# Patient Record
Sex: FEMALE | Race: WHITE | Employment: FULL TIME
[De-identification: names, ages, dates, MRNs, and addresses within clinical notes are randomized per-mention and may not be internally consistent; named-entity substitution may affect disease eponyms.]

---

## 2017-07-17 ENCOUNTER — CLINICAL DOCUMENTATION (OUTPATIENT)
Dept: OTHER | Facility: CLINIC | Age: 52
End: 2017-07-17

## 2018-03-28 DIAGNOSIS — J45.31 MILD PERSISTENT ASTHMATIC BRONCHITIS WITH ACUTE EXACERBATION: ICD-10-CM

## 2018-03-28 RX ORDER — FLUTICASONE FUROATE AND VILANTEROL 200; 25 UG/1; UG/1
1 POWDER RESPIRATORY (INHALATION) DAILY
Qty: 2 EACH | Refills: 5 | Status: SHIPPED | OUTPATIENT
Start: 2018-03-28 | End: 2019-05-02 | Stop reason: SDUPTHER

## 2018-03-28 RX ORDER — MONTELUKAST SODIUM 10 MG/1
10 TABLET ORAL NIGHTLY
Qty: 90 TABLET | Refills: 3 | Status: SHIPPED | OUTPATIENT
Start: 2018-03-28 | End: 2019-04-15 | Stop reason: SDUPTHER

## 2018-07-20 RX ORDER — PANTOPRAZOLE SODIUM 40 MG/1
40 TABLET, DELAYED RELEASE ORAL DAILY
Qty: 30 TABLET | Refills: 5 | Status: SHIPPED | OUTPATIENT
Start: 2018-07-20 | End: 2019-02-04 | Stop reason: SDUPTHER

## 2018-11-07 ENCOUNTER — TELEPHONE (OUTPATIENT)
Dept: FAMILY MEDICINE CLINIC | Age: 53
End: 2018-11-07

## 2018-11-12 ENCOUNTER — HOSPITAL ENCOUNTER (EMERGENCY)
Age: 53
Discharge: HOME | End: 2018-11-12
Attending: EMERGENCY MEDICINE
Payer: COMMERCIAL

## 2018-11-12 ENCOUNTER — APPOINTMENT (OUTPATIENT)
Dept: CT IMAGING | Age: 53
End: 2018-11-12
Payer: COMMERCIAL

## 2018-11-12 ENCOUNTER — OFFICE VISIT (OUTPATIENT)
Dept: FAMILY MEDICINE CLINIC | Age: 53
End: 2018-11-12
Payer: COMMERCIAL

## 2018-11-12 VITALS
BODY MASS INDEX: 38.65 KG/M2 | RESPIRATION RATE: 16 BRPM | HEART RATE: 90 BPM | WEIGHT: 270 LBS | DIASTOLIC BLOOD PRESSURE: 80 MMHG | OXYGEN SATURATION: 97 % | HEIGHT: 70 IN | TEMPERATURE: 98.1 F | SYSTOLIC BLOOD PRESSURE: 140 MMHG

## 2018-11-12 VITALS
HEART RATE: 106 BPM | RESPIRATION RATE: 18 BRPM | OXYGEN SATURATION: 95 % | HEIGHT: 70 IN | WEIGHT: 275 LBS | BODY MASS INDEX: 39.37 KG/M2 | DIASTOLIC BLOOD PRESSURE: 80 MMHG | SYSTOLIC BLOOD PRESSURE: 138 MMHG | TEMPERATURE: 98.2 F

## 2018-11-12 DIAGNOSIS — R10.9 FLANK PAIN: Primary | ICD-10-CM

## 2018-11-12 DIAGNOSIS — N20.0 KIDNEY STONE: ICD-10-CM

## 2018-11-12 DIAGNOSIS — R10.9 RIGHT FLANK PAIN: Primary | ICD-10-CM

## 2018-11-12 LAB
ALBUMIN SERPL-MCNC: 4.5 G/DL (ref 3.5–5.2)
ALP BLD-CCNC: 79 U/L (ref 35–104)
ALT SERPL-CCNC: 52 U/L (ref 0–32)
ANION GAP SERPL CALCULATED.3IONS-SCNC: 10 MMOL/L (ref 7–16)
AST SERPL-CCNC: 27 U/L (ref 0–31)
BACTERIA: NORMAL /HPF
BASOPHILS ABSOLUTE: 0.04 E9/L (ref 0–0.2)
BASOPHILS RELATIVE PERCENT: 0.7 % (ref 0–2)
BILIRUB SERPL-MCNC: 0.4 MG/DL (ref 0–1.2)
BILIRUBIN URINE: NEGATIVE
BILIRUBIN, POC: NORMAL
BLOOD URINE, POC: NORMAL
BLOOD, URINE: NEGATIVE
BUN BLDV-MCNC: 13 MG/DL (ref 6–20)
CALCIUM SERPL-MCNC: 9.4 MG/DL (ref 8.6–10.2)
CHLORIDE BLD-SCNC: 103 MMOL/L (ref 98–107)
CLARITY, POC: CLEAR
CLARITY: CLEAR
CO2: 28 MMOL/L (ref 22–29)
COLOR, POC: YELLOW
COLOR: ABNORMAL
CREAT SERPL-MCNC: 0.6 MG/DL (ref 0.5–1)
D DIMER: <200 NG/ML DDU
EOSINOPHILS ABSOLUTE: 0.18 E9/L (ref 0.05–0.5)
EOSINOPHILS RELATIVE PERCENT: 3.2 % (ref 0–6)
GFR AFRICAN AMERICAN: >60
GFR NON-AFRICAN AMERICAN: >60 ML/MIN/1.73
GLUCOSE BLD-MCNC: 97 MG/DL (ref 74–99)
GLUCOSE URINE, POC: NORMAL
GLUCOSE URINE: NEGATIVE MG/DL
HCT VFR BLD CALC: 45.3 % (ref 34–48)
HEMOGLOBIN: 14 G/DL (ref 11.5–15.5)
IMMATURE GRANULOCYTES #: 0.01 E9/L
IMMATURE GRANULOCYTES %: 0.2 % (ref 0–5)
KETONES, POC: NORMAL
KETONES, URINE: NEGATIVE MG/DL
LACTIC ACID: 0.9 MMOL/L (ref 0.5–2.2)
LEUKOCYTE EST, POC: NORMAL
LEUKOCYTE ESTERASE, URINE: ABNORMAL
LYMPHOCYTES ABSOLUTE: 1.5 E9/L (ref 1.5–4)
LYMPHOCYTES RELATIVE PERCENT: 26.5 % (ref 20–42)
MCH RBC QN AUTO: 28.7 PG (ref 26–35)
MCHC RBC AUTO-ENTMCNC: 30.9 % (ref 32–34.5)
MCV RBC AUTO: 92.8 FL (ref 80–99.9)
MONOCYTES ABSOLUTE: 0.56 E9/L (ref 0.1–0.95)
MONOCYTES RELATIVE PERCENT: 9.9 % (ref 2–12)
NEUTROPHILS ABSOLUTE: 3.37 E9/L (ref 1.8–7.3)
NEUTROPHILS RELATIVE PERCENT: 59.5 % (ref 43–80)
NITRITE, POC: NORMAL
NITRITE, URINE: NEGATIVE
PDW BLD-RTO: 14 FL (ref 11.5–15)
PH UA: 7.5 (ref 5–9)
PH, POC: 7
PLATELET # BLD: 264 E9/L (ref 130–450)
PMV BLD AUTO: 10.7 FL (ref 7–12)
POTASSIUM SERPL-SCNC: 4.1 MMOL/L (ref 3.5–5)
PROTEIN UA: NEGATIVE MG/DL
PROTEIN, POC: NORMAL
RBC # BLD: 4.88 E12/L (ref 3.5–5.5)
RBC UA: NORMAL /HPF (ref 0–2)
SODIUM BLD-SCNC: 141 MMOL/L (ref 132–146)
SPECIFIC GRAVITY UA: 1.01 (ref 1–1.03)
SPECIFIC GRAVITY, POC: 1.01
TOTAL PROTEIN: 7.3 G/DL (ref 6.4–8.3)
UROBILINOGEN, POC: NORMAL
UROBILINOGEN, URINE: 0.2 E.U./DL
WBC # BLD: 5.7 E9/L (ref 4.5–11.5)
WBC UA: NORMAL /HPF (ref 0–5)

## 2018-11-12 PROCEDURE — 96374 THER/PROPH/DIAG INJ IV PUSH: CPT

## 2018-11-12 PROCEDURE — 83605 ASSAY OF LACTIC ACID: CPT

## 2018-11-12 PROCEDURE — 85378 FIBRIN DEGRADE SEMIQUANT: CPT

## 2018-11-12 PROCEDURE — 87088 URINE BACTERIA CULTURE: CPT

## 2018-11-12 PROCEDURE — 74176 CT ABD & PELVIS W/O CONTRAST: CPT

## 2018-11-12 PROCEDURE — 85025 COMPLETE CBC W/AUTO DIFF WBC: CPT

## 2018-11-12 PROCEDURE — 6360000002 HC RX W HCPCS: Performed by: NURSE PRACTITIONER

## 2018-11-12 PROCEDURE — 99284 EMERGENCY DEPT VISIT MOD MDM: CPT

## 2018-11-12 PROCEDURE — 2580000003 HC RX 258: Performed by: NURSE PRACTITIONER

## 2018-11-12 PROCEDURE — 99214 OFFICE O/P EST MOD 30 MIN: CPT | Performed by: FAMILY MEDICINE

## 2018-11-12 PROCEDURE — 81002 URINALYSIS NONAUTO W/O SCOPE: CPT | Performed by: FAMILY MEDICINE

## 2018-11-12 PROCEDURE — 81001 URINALYSIS AUTO W/SCOPE: CPT

## 2018-11-12 PROCEDURE — 80053 COMPREHEN METABOLIC PANEL: CPT

## 2018-11-12 RX ORDER — TIZANIDINE HYDROCHLORIDE 4 MG/1
4 CAPSULE, GELATIN COATED ORAL 3 TIMES DAILY PRN
Qty: 12 CAPSULE | Refills: 0 | Status: SHIPPED | OUTPATIENT
Start: 2018-11-12 | End: 2018-11-16

## 2018-11-12 RX ORDER — 0.9 % SODIUM CHLORIDE 0.9 %
1000 INTRAVENOUS SOLUTION INTRAVENOUS ONCE
Status: COMPLETED | OUTPATIENT
Start: 2018-11-12 | End: 2018-11-12

## 2018-11-12 RX ORDER — KETOROLAC TROMETHAMINE 30 MG/ML
30 INJECTION, SOLUTION INTRAMUSCULAR; INTRAVENOUS ONCE
Status: COMPLETED | OUTPATIENT
Start: 2018-11-12 | End: 2018-11-12

## 2018-11-12 RX ORDER — NAPROXEN 500 MG/1
500 TABLET ORAL 2 TIMES DAILY
Qty: 14 TABLET | Refills: 0 | Status: SHIPPED | OUTPATIENT
Start: 2018-11-12 | End: 2019-02-15 | Stop reason: ALTCHOICE

## 2018-11-12 RX ORDER — SULFASALAZINE 500 MG/1
TABLET ORAL
COMMUNITY
Start: 2018-10-28 | End: 2019-10-21 | Stop reason: SDUPTHER

## 2018-11-12 RX ADMIN — KETOROLAC TROMETHAMINE 30 MG: 30 INJECTION, SOLUTION INTRAMUSCULAR at 19:29

## 2018-11-12 RX ADMIN — SODIUM CHLORIDE 1000 ML: 9 INJECTION, SOLUTION INTRAVENOUS at 19:29

## 2018-11-12 ASSESSMENT — ENCOUNTER SYMPTOMS
NAUSEA: 0
DIARRHEA: 0
ALLERGIC/IMMUNOLOGIC NEGATIVE: 1
VOMITING: 0
COLOR CHANGE: 0
PHOTOPHOBIA: 0
CHEST TIGHTNESS: 0
SINUS PRESSURE: 0
WHEEZING: 0
SHORTNESS OF BREATH: 0
ABDOMINAL PAIN: 0
BACK PAIN: 0
BLOOD IN STOOL: 0
COUGH: 0
APNEA: 0
SORE THROAT: 0
FACIAL SWELLING: 0

## 2018-11-12 ASSESSMENT — PATIENT HEALTH QUESTIONNAIRE - PHQ9
SUM OF ALL RESPONSES TO PHQ QUESTIONS 1-9: 0
SUM OF ALL RESPONSES TO PHQ9 QUESTIONS 1 & 2: 0
SUM OF ALL RESPONSES TO PHQ QUESTIONS 1-9: 0
1. LITTLE INTEREST OR PLEASURE IN DOING THINGS: 0
2. FEELING DOWN, DEPRESSED OR HOPELESS: 0

## 2018-11-12 ASSESSMENT — PAIN DESCRIPTION - PROGRESSION: CLINICAL_PROGRESSION: GRADUALLY IMPROVING

## 2018-11-12 ASSESSMENT — PAIN SCALES - GENERAL
PAINLEVEL_OUTOF10: 9
PAINLEVEL_OUTOF10: 9
PAINLEVEL_OUTOF10: 4

## 2018-11-12 ASSESSMENT — PAIN DESCRIPTION - LOCATION: LOCATION: FLANK

## 2018-11-12 ASSESSMENT — PAIN DESCRIPTION - DESCRIPTORS: DESCRIPTORS: ACHING;DULL

## 2018-11-12 ASSESSMENT — PAIN DESCRIPTION - ORIENTATION: ORIENTATION: RIGHT

## 2018-11-12 NOTE — ED NOTES
FIRST PROVIDER CONTACT ASSESSMENT NOTE      Department of Emergency Medicine   11/12/18  5:19 PM    Chief Complaint: Flank Pain (right sided- started around 8 am )    History of Present Illness:   Armand Coleman is a 46 y.o. female who presents to the ED for right sided flank pain around 8am this morning. She described the pain as stabbing. She also reports burning with urination and small amounts of blood in the urine. She denies nausea, vomiting, diarrhea, or fever. Focused Physical Exam:  VS:  BP (!) 137/92   Pulse 126   Temp 98.1 °F (36.7 °C) (Oral)   Resp 16   Ht 5' 10\" (1.778 m)   Wt 270 lb (122.5 kg)   SpO2 97%   BMI 38.74 kg/m²      General: Alert and in no apparent distress   Abd: Non-tender, non-distended  Back: Right CVA tenderness     Medical History:  has a past medical history of Arthritis and Asthma. Surgical History:  has a past surgical history that includes Cystoscopy (01/10/2015). Social History:  reports that she has never smoked. She has never used smokeless tobacco. She reports that she drinks alcohol. She reports that she does not use drugs. Family History: family history is not on file.     *ALLERGIES*     Penicillins     Initial Plan of Care:  Initiate Treatment-Testing, Proceed toTreatment Area When Bed Available for ED Attending/MLP to Continue Care    -----------------END OF FIRST PROVIDER CONTACT ASSESSMENT NOTE--------------  Electronically signed by Robert Brown PA-C   DD: 11/12/18         Robert Brown PA-C  11/12/18 5801

## 2018-11-12 NOTE — ED PROVIDER NOTES
History:  reports that she has never smoked. She has never used smokeless tobacco. She reports that she drinks alcohol. She reports that she does not use drugs. Family History: family history is not on file. Allergies: Penicillins    Physical Exam            ED Triage Vitals [11/12/18 1720]   BP Temp Temp Source Pulse Resp SpO2 Height Weight   (!) 137/92 98.1 °F (36.7 °C) Oral 126 16 97 % 5' 10\" (1.778 m) 270 lb (122.5 kg)      Oxygen Saturation Interpretation: Normal.    · General Appearance/Constitutional:  Alert, development consistent with age, NAD  · HEENT:  NC/NT. PERRLA. Airway patent. · Neck:  Supple. No lymphadenopathy. No meningeal signs. · Respiratory:  No retractions. Lungs Clear to auscultation and breath sounds equal.  · CV:  Regular rate and rhythm. · GI:  General Appearance: normal.         Bowel sounds: normal bowel sounds. Distension:  None. Tenderness: moderate tenderness is present in the right flank, no rebound tenderness, no guarding. Patient's pain is reproducible with light palpation noted to the right flank. There are no wounds, lacerations or abrasions or ecchymosis noted here. There is no depressions or deformities noted in the ribs. Liver: non-tender. Spleen:  non-tender. Pulsatile Mass: absent. · Back: CVA Tenderness: Yes, Right. · Integument:  Normal turgor. Warm, dry, without visible rash, unless noted elsewhere. · Lymphatics: No edema, cap.refill <3sec. · Neurological:  Orientation age-appropriate. Motor functions intact.     Lab / Imaging Results   (All laboratory and radiology results have been personally reviewed by myself)  Labs:  Results for orders placed or performed during the hospital encounter of 11/12/18   CBC Auto Differential   Result Value Ref Range    WBC 5.7 4.5 - 11.5 E9/L    RBC 4.88 3.50 - 5.50 E12/L    Hemoglobin 14.0 11.5 - 15.5 g/dL    Hematocrit 45.3 34.0 - 48.0 %    MCV 92.8 80.0 - 99.9 fL MCH 28.7 26.0 - 35.0 pg    MCHC 30.9 (L) 32.0 - 34.5 %    RDW 14.0 11.5 - 15.0 fL    Platelets 397 527 - 219 E9/L    MPV 10.7 7.0 - 12.0 fL    Neutrophils % 59.5 43.0 - 80.0 %    Immature Granulocytes % 0.2 0.0 - 5.0 %    Lymphocytes % 26.5 20.0 - 42.0 %    Monocytes % 9.9 2.0 - 12.0 %    Eosinophils % 3.2 0.0 - 6.0 %    Basophils % 0.7 0.0 - 2.0 %    Neutrophils # 3.37 1.80 - 7.30 E9/L    Immature Granulocytes # 0.01 E9/L    Lymphocytes # 1.50 1.50 - 4.00 E9/L    Monocytes # 0.56 0.10 - 0.95 E9/L    Eosinophils # 0.18 0.05 - 0.50 E9/L    Basophils # 0.04 0.00 - 0.20 E9/L   Comprehensive Metabolic Panel   Result Value Ref Range    Sodium 141 132 - 146 mmol/L    Potassium 4.1 3.5 - 5.0 mmol/L    Chloride 103 98 - 107 mmol/L    CO2 28 22 - 29 mmol/L    Anion Gap 10 7 - 16 mmol/L    Glucose 97 74 - 99 mg/dL    BUN 13 6 - 20 mg/dL    CREATININE 0.6 0.5 - 1.0 mg/dL    GFR Non-African American >60 >=60 mL/min/1.73    GFR African American >60     Calcium 9.4 8.6 - 10.2 mg/dL    Total Protein 7.3 6.4 - 8.3 g/dL    Alb 4.5 3.5 - 5.2 g/dL    Total Bilirubin 0.4 0.0 - 1.2 mg/dL    Alkaline Phosphatase 79 35 - 104 U/L    ALT 52 (H) 0 - 32 U/L    AST 27 0 - 31 U/L   Urinalysis   Result Value Ref Range    Color, UA Straw Straw/Yellow    Clarity, UA Clear Clear    Glucose, Ur Negative Negative mg/dL    Bilirubin Urine Negative Negative    Ketones, Urine Negative Negative mg/dL    Specific Gravity, UA 1.010 1.005 - 1.030    Blood, Urine Negative Negative    pH, UA 7.5 5.0 - 9.0    Protein, UA Negative Negative mg/dL    Urobilinogen, Urine 0.2 <2.0 E.U./dL    Nitrite, Urine Negative Negative    Leukocyte Esterase, Urine SMALL (A) Negative   Lactic Acid, Plasma   Result Value Ref Range    Lactic Acid 0.9 0.5 - 2.2 mmol/L   D-Dimer, Quantitative   Result Value Ref Range    D-Dimer, Quant <200 ng/mL DDU   Microscopic Urinalysis   Result Value Ref Range    WBC, UA 1-3 0 - 5 /HPF    RBC, UA NONE 0 - 2 /HPF    Bacteria, UA NONE /HPF OF PROVIDER NOTE         Kris Rice, AMIRAH - CNP  11/12/18 1042

## 2018-11-12 NOTE — PROGRESS NOTES
TABLET BY MOUTH EVERY DAY with evening meal  5    Cholecalciferol (VITAMIN D) 2000 units CAPS capsule Take by mouth      cetirizine (ZYRTEC) 10 MG tablet Take 10 mg by mouth daily      hydroxychloroquine (PLAQUENIL) 200 MG tablet Take 2 tablets by mouth daily 60 tablet 0    predniSONE (DELTASONE) 5 MG tablet Take 1 tablet by mouth daily 30 tablet 0     No current facility-administered medications for this visit. Allergies   Allergen Reactions    Penicillins        Social History     Social History    Marital status:      Spouse name: N/A    Number of children: N/A    Years of education: N/A     Social History Main Topics    Smoking status: Never Smoker    Smokeless tobacco: Never Used    Alcohol use Yes      Comment: social    Drug use: No    Sexual activity: Not Asked     Other Topics Concern    None     Social History Narrative    None       No family history on file. Review of Systems   Constitutional: Negative. HENT: Negative for congestion, facial swelling, hearing loss, nosebleeds, sinus pressure and sore throat. Eyes: Negative for photophobia and visual disturbance. Respiratory: Negative for apnea, cough, chest tightness, shortness of breath and wheezing. Cardiovascular: Negative for chest pain, palpitations and leg swelling. Gastrointestinal: Negative for abdominal pain, blood in stool, diarrhea, nausea and vomiting. Genitourinary: Positive for flank pain and hematuria. Negative for difficulty urinating, frequency and urgency. Musculoskeletal: Negative for arthralgias, back pain, joint swelling and myalgias. Skin: Negative for color change and rash. Allergic/Immunologic: Negative. Neurological: Negative for syncope, weakness, light-headedness and headaches. Hematological: Negative. Psychiatric/Behavioral: Negative for agitation, behavioral problems, confusion and self-injury. The patient is not nervous/anxious.     All other systems reviewed and

## 2018-11-15 LAB — URINE CULTURE, ROUTINE: NORMAL

## 2019-02-04 RX ORDER — PANTOPRAZOLE SODIUM 40 MG/1
TABLET, DELAYED RELEASE ORAL
Qty: 30 TABLET | Refills: 4 | Status: SHIPPED | OUTPATIENT
Start: 2019-02-04 | End: 2019-07-25 | Stop reason: SDUPTHER

## 2019-02-15 ENCOUNTER — HOSPITAL ENCOUNTER (EMERGENCY)
Age: 54
Discharge: HOME | End: 2019-02-15
Attending: EMERGENCY MEDICINE
Payer: COMMERCIAL

## 2019-02-15 VITALS
SYSTOLIC BLOOD PRESSURE: 134 MMHG | HEIGHT: 70 IN | OXYGEN SATURATION: 94 % | TEMPERATURE: 98.1 F | HEART RATE: 80 BPM | RESPIRATION RATE: 16 BRPM | BODY MASS INDEX: 38.65 KG/M2 | DIASTOLIC BLOOD PRESSURE: 68 MMHG | WEIGHT: 270 LBS

## 2019-02-15 DIAGNOSIS — R11.2 NON-INTRACTABLE VOMITING WITH NAUSEA, UNSPECIFIED VOMITING TYPE: Primary | ICD-10-CM

## 2019-02-15 DIAGNOSIS — R19.7 DIARRHEA OF PRESUMED INFECTIOUS ORIGIN: ICD-10-CM

## 2019-02-15 LAB
ANION GAP SERPL CALCULATED.3IONS-SCNC: 9 MMOL/L (ref 7–16)
BACTERIA: ABNORMAL /HPF
BASOPHILS ABSOLUTE: 0.03 E9/L (ref 0–0.2)
BASOPHILS RELATIVE PERCENT: 0.7 % (ref 0–2)
BILIRUBIN URINE: ABNORMAL
BLOOD, URINE: NEGATIVE
BUN BLDV-MCNC: 15 MG/DL (ref 6–20)
CALCIUM SERPL-MCNC: 8.4 MG/DL (ref 8.6–10.2)
CHLORIDE BLD-SCNC: 105 MMOL/L (ref 98–107)
CLARITY: CLEAR
CO2: 29 MMOL/L (ref 22–29)
COLOR: YELLOW
CREAT SERPL-MCNC: 0.6 MG/DL (ref 0.5–1)
EOSINOPHILS ABSOLUTE: 0.13 E9/L (ref 0.05–0.5)
EOSINOPHILS RELATIVE PERCENT: 3 % (ref 0–6)
EPITHELIAL CELLS, UA: ABNORMAL /HPF
GFR AFRICAN AMERICAN: >60
GFR NON-AFRICAN AMERICAN: >60 ML/MIN/1.73
GLUCOSE BLD-MCNC: 92 MG/DL (ref 74–99)
GLUCOSE URINE: NEGATIVE MG/DL
HCT VFR BLD CALC: 43.1 % (ref 34–48)
HEMOGLOBIN: 13.7 G/DL (ref 11.5–15.5)
IMMATURE GRANULOCYTES #: 0.01 E9/L
IMMATURE GRANULOCYTES %: 0.2 % (ref 0–5)
KETONES, URINE: ABNORMAL MG/DL
LACTIC ACID: 1.1 MMOL/L (ref 0.5–2.2)
LEUKOCYTE ESTERASE, URINE: ABNORMAL
LYMPHOCYTES ABSOLUTE: 0.93 E9/L (ref 1.5–4)
LYMPHOCYTES RELATIVE PERCENT: 21.4 % (ref 20–42)
MAGNESIUM: 1.7 MG/DL (ref 1.6–2.6)
MCH RBC QN AUTO: 29.5 PG (ref 26–35)
MCHC RBC AUTO-ENTMCNC: 31.8 % (ref 32–34.5)
MCV RBC AUTO: 92.9 FL (ref 80–99.9)
MONOCYTES ABSOLUTE: 0.45 E9/L (ref 0.1–0.95)
MONOCYTES RELATIVE PERCENT: 10.4 % (ref 2–12)
NEUTROPHILS ABSOLUTE: 2.79 E9/L (ref 1.8–7.3)
NEUTROPHILS RELATIVE PERCENT: 64.3 % (ref 43–80)
NITRITE, URINE: NEGATIVE
PDW BLD-RTO: 13.2 FL (ref 11.5–15)
PH UA: 6 (ref 5–9)
PLATELET # BLD: 248 E9/L (ref 130–450)
PMV BLD AUTO: 11 FL (ref 7–12)
POTASSIUM REFLEX MAGNESIUM: 3.3 MMOL/L (ref 3.5–5)
PROTEIN UA: NEGATIVE MG/DL
RBC # BLD: 4.64 E12/L (ref 3.5–5.5)
RBC UA: ABNORMAL /HPF (ref 0–2)
SODIUM BLD-SCNC: 143 MMOL/L (ref 132–146)
SPECIFIC GRAVITY UA: 1.02 (ref 1–1.03)
UROBILINOGEN, URINE: 0.2 E.U./DL
WBC # BLD: 4.3 E9/L (ref 4.5–11.5)
WBC UA: ABNORMAL /HPF (ref 0–5)

## 2019-02-15 PROCEDURE — 2580000003 HC RX 258: Performed by: EMERGENCY MEDICINE

## 2019-02-15 PROCEDURE — 99283 EMERGENCY DEPT VISIT LOW MDM: CPT

## 2019-02-15 PROCEDURE — 96375 TX/PRO/DX INJ NEW DRUG ADDON: CPT

## 2019-02-15 PROCEDURE — 87088 URINE BACTERIA CULTURE: CPT

## 2019-02-15 PROCEDURE — 83605 ASSAY OF LACTIC ACID: CPT

## 2019-02-15 PROCEDURE — 81001 URINALYSIS AUTO W/SCOPE: CPT

## 2019-02-15 PROCEDURE — 36415 COLL VENOUS BLD VENIPUNCTURE: CPT

## 2019-02-15 PROCEDURE — 80048 BASIC METABOLIC PNL TOTAL CA: CPT

## 2019-02-15 PROCEDURE — 6360000002 HC RX W HCPCS: Performed by: EMERGENCY MEDICINE

## 2019-02-15 PROCEDURE — 96374 THER/PROPH/DIAG INJ IV PUSH: CPT

## 2019-02-15 PROCEDURE — 83735 ASSAY OF MAGNESIUM: CPT

## 2019-02-15 PROCEDURE — 85025 COMPLETE CBC W/AUTO DIFF WBC: CPT

## 2019-02-15 RX ORDER — ONDANSETRON 2 MG/ML
4 INJECTION INTRAMUSCULAR; INTRAVENOUS ONCE
Status: COMPLETED | OUTPATIENT
Start: 2019-02-15 | End: 2019-02-15

## 2019-02-15 RX ORDER — MORPHINE SULFATE 2 MG/ML
2 INJECTION, SOLUTION INTRAMUSCULAR; INTRAVENOUS ONCE
Status: DISCONTINUED | OUTPATIENT
Start: 2019-02-15 | End: 2019-02-15 | Stop reason: HOSPADM

## 2019-02-15 RX ORDER — SODIUM CHLORIDE 9 MG/ML
INJECTION, SOLUTION INTRAVENOUS ONCE
Status: COMPLETED | OUTPATIENT
Start: 2019-02-15 | End: 2019-02-15

## 2019-02-15 RX ORDER — SODIUM CHLORIDE 0.9 % (FLUSH) 0.9 %
10 SYRINGE (ML) INJECTION PRN
Status: DISCONTINUED | OUTPATIENT
Start: 2019-02-15 | End: 2019-02-15 | Stop reason: HOSPADM

## 2019-02-15 RX ORDER — PROMETHAZINE HYDROCHLORIDE 25 MG/1
25 TABLET ORAL EVERY 6 HOURS PRN
Qty: 20 TABLET | Refills: 0 | Status: SHIPPED | OUTPATIENT
Start: 2019-02-15 | End: 2019-02-20

## 2019-02-15 RX ORDER — PROMETHAZINE HYDROCHLORIDE 25 MG/ML
12.5 INJECTION, SOLUTION INTRAMUSCULAR; INTRAVENOUS ONCE
Status: COMPLETED | OUTPATIENT
Start: 2019-02-15 | End: 2019-02-15

## 2019-02-15 RX ORDER — 0.9 % SODIUM CHLORIDE 0.9 %
2000 INTRAVENOUS SOLUTION INTRAVENOUS ONCE
Status: COMPLETED | OUTPATIENT
Start: 2019-02-15 | End: 2019-02-15

## 2019-02-15 RX ORDER — SODIUM CHLORIDE 0.9 % (FLUSH) 0.9 %
10 SYRINGE (ML) INJECTION 2 TIMES DAILY
Status: DISCONTINUED | OUTPATIENT
Start: 2019-02-15 | End: 2019-02-15 | Stop reason: HOSPADM

## 2019-02-15 RX ORDER — POTASSIUM CHLORIDE 7.45 MG/ML
10 INJECTION INTRAVENOUS ONCE
Status: COMPLETED | OUTPATIENT
Start: 2019-02-15 | End: 2019-02-15

## 2019-02-15 RX ORDER — DIPHENOXYLATE HYDROCHLORIDE AND ATROPINE SULFATE 2.5; .025 MG/1; MG/1
1 TABLET ORAL 4 TIMES DAILY PRN
Qty: 20 TABLET | Refills: 0 | Status: SHIPPED | OUTPATIENT
Start: 2019-02-15 | End: 2019-02-25

## 2019-02-15 RX ADMIN — ONDANSETRON 4 MG: 2 INJECTION INTRAMUSCULAR; INTRAVENOUS at 09:04

## 2019-02-15 RX ADMIN — SODIUM CHLORIDE 2000 ML: 9 INJECTION, SOLUTION INTRAVENOUS at 09:04

## 2019-02-15 RX ADMIN — Medication 10 ML: at 10:37

## 2019-02-15 RX ADMIN — SODIUM CHLORIDE: 9 INJECTION, SOLUTION INTRAVENOUS at 12:24

## 2019-02-15 RX ADMIN — Medication 10 ML: at 12:09

## 2019-02-15 RX ADMIN — POTASSIUM CHLORIDE 10 MEQ: 10 INJECTION, SOLUTION INTRAVENOUS at 12:24

## 2019-02-15 RX ADMIN — PROMETHAZINE HYDROCHLORIDE 12.5 MG: 25 INJECTION INTRAMUSCULAR; INTRAVENOUS at 12:09

## 2019-02-15 ASSESSMENT — PAIN DESCRIPTION - PAIN TYPE: TYPE: ACUTE PAIN

## 2019-02-15 ASSESSMENT — PAIN DESCRIPTION - DESCRIPTORS: DESCRIPTORS: ACHING

## 2019-02-15 ASSESSMENT — PAIN SCALES - WONG BAKER: WONGBAKER_NUMERICALRESPONSE: 2

## 2019-02-15 ASSESSMENT — PAIN DESCRIPTION - LOCATION: LOCATION: HEAD

## 2019-02-15 ASSESSMENT — PAIN DESCRIPTION - FREQUENCY: FREQUENCY: CONTINUOUS

## 2019-02-15 ASSESSMENT — PAIN SCALES - GENERAL: PAINLEVEL_OUTOF10: 2

## 2019-02-17 LAB — URINE CULTURE, ROUTINE: NORMAL

## 2019-03-28 ENCOUNTER — OFFICE VISIT (OUTPATIENT)
Dept: FAMILY MEDICINE CLINIC | Age: 54
End: 2019-03-28
Payer: COMMERCIAL

## 2019-03-28 VITALS
TEMPERATURE: 98.5 F | RESPIRATION RATE: 18 BRPM | DIASTOLIC BLOOD PRESSURE: 88 MMHG | WEIGHT: 270 LBS | OXYGEN SATURATION: 96 % | SYSTOLIC BLOOD PRESSURE: 126 MMHG | HEIGHT: 70 IN | HEART RATE: 83 BPM | BODY MASS INDEX: 38.65 KG/M2

## 2019-03-28 DIAGNOSIS — J45.40 MODERATE PERSISTENT ASTHMA WITHOUT COMPLICATION: Primary | ICD-10-CM

## 2019-03-28 DIAGNOSIS — J06.9 VIRAL URI: ICD-10-CM

## 2019-03-28 DIAGNOSIS — J40 BRONCHITIS: ICD-10-CM

## 2019-03-28 PROCEDURE — 99213 OFFICE O/P EST LOW 20 MIN: CPT | Performed by: FAMILY MEDICINE

## 2019-03-28 RX ORDER — PREDNISONE 10 MG/1
TABLET ORAL
Qty: 18 TABLET | Refills: 0 | Status: SHIPPED | OUTPATIENT
Start: 2019-03-28 | End: 2019-04-09

## 2019-03-28 RX ORDER — DOXYCYCLINE HYCLATE 100 MG
100 TABLET ORAL 2 TIMES DAILY
Qty: 20 TABLET | Refills: 0 | Status: SHIPPED | OUTPATIENT
Start: 2019-03-28 | End: 2019-04-07

## 2019-03-28 RX ORDER — ALBUTEROL SULFATE 90 UG/1
2 AEROSOL, METERED RESPIRATORY (INHALATION) 4 TIMES DAILY PRN
Qty: 1 INHALER | Refills: 0 | Status: SHIPPED
Start: 2019-03-28 | End: 2022-09-26 | Stop reason: SDUPTHER

## 2019-03-28 ASSESSMENT — ENCOUNTER SYMPTOMS
DIARRHEA: 0
COLOR CHANGE: 0
WHEEZING: 1
SORE THROAT: 0
BLOOD IN STOOL: 0
EYE PAIN: 0
COUGH: 1
EYE REDNESS: 0
SINUS PRESSURE: 0
RHINORRHEA: 1
ABDOMINAL PAIN: 0
BACK PAIN: 0
NAUSEA: 0
SHORTNESS OF BREATH: 0
CHEST TIGHTNESS: 0
VOMITING: 0
APNEA: 0
CONSTIPATION: 0
EYE ITCHING: 0

## 2019-03-28 ASSESSMENT — PATIENT HEALTH QUESTIONNAIRE - PHQ9
2. FEELING DOWN, DEPRESSED OR HOPELESS: 0
SUM OF ALL RESPONSES TO PHQ QUESTIONS 1-9: 0
SUM OF ALL RESPONSES TO PHQ9 QUESTIONS 1 & 2: 0
1. LITTLE INTEREST OR PLEASURE IN DOING THINGS: 0
SUM OF ALL RESPONSES TO PHQ QUESTIONS 1-9: 0

## 2019-04-09 ENCOUNTER — OFFICE VISIT (OUTPATIENT)
Dept: FAMILY MEDICINE CLINIC | Age: 54
End: 2019-04-09
Payer: COMMERCIAL

## 2019-04-09 VITALS
TEMPERATURE: 98 F | RESPIRATION RATE: 20 BRPM | WEIGHT: 270 LBS | HEIGHT: 70 IN | BODY MASS INDEX: 38.65 KG/M2 | SYSTOLIC BLOOD PRESSURE: 132 MMHG | DIASTOLIC BLOOD PRESSURE: 72 MMHG | HEART RATE: 78 BPM

## 2019-04-09 DIAGNOSIS — G47.33 OSA (OBSTRUCTIVE SLEEP APNEA): ICD-10-CM

## 2019-04-09 DIAGNOSIS — J45.41 MODERATE PERSISTENT ASTHMATIC BRONCHITIS WITH ACUTE EXACERBATION: Primary | ICD-10-CM

## 2019-04-09 PROCEDURE — 99213 OFFICE O/P EST LOW 20 MIN: CPT | Performed by: FAMILY MEDICINE

## 2019-04-09 RX ORDER — PREDNISONE 20 MG/1
TABLET ORAL
Qty: 11 TABLET | Refills: 0 | Status: SHIPPED | OUTPATIENT
Start: 2019-04-09 | End: 2019-04-16

## 2019-04-09 RX ORDER — LEVOFLOXACIN 500 MG/1
500 TABLET, FILM COATED ORAL DAILY
Qty: 10 TABLET | Refills: 0 | Status: SHIPPED | OUTPATIENT
Start: 2019-04-09 | End: 2019-04-16

## 2019-04-09 ASSESSMENT — ENCOUNTER SYMPTOMS
SHORTNESS OF BREATH: 0
BLOOD IN STOOL: 0
CHEST TIGHTNESS: 0
PHOTOPHOBIA: 0
APNEA: 0
FACIAL SWELLING: 0
VOMITING: 0
BACK PAIN: 0
ALLERGIC/IMMUNOLOGIC NEGATIVE: 1
COUGH: 1
WHEEZING: 1
DIARRHEA: 0
NAUSEA: 0
ABDOMINAL PAIN: 0
SORE THROAT: 0
SINUS PRESSURE: 0
COLOR CHANGE: 0

## 2019-04-09 NOTE — PROGRESS NOTES
Chief Complaint:   Chief Complaint   Patient presents with    Cough     Finished her meds that Dr. Dev Aguilar prescribed and she is no better.  Wheezing       Cough   This is a new problem. The current episode started in the past 7 days. The problem occurs constantly. The cough is productive of sputum. Associated symptoms include wheezing. Pertinent negatives include no chest pain, headaches, myalgias, rash, sore throat or shortness of breath. She has tried steroid inhaler and a beta-agonist inhaler for the symptoms.        Patient Active Problem List   Diagnosis    Arthritis    Asthma    Acute cystitis without hematuria    Viral URI    Bronchitis       Past Medical History:   Diagnosis Date    Arthritis     Asthma        Past Surgical History:   Procedure Laterality Date    CYSTOSCOPY  01/10/2015    uretroscopy,lithotripsy,stent placement       Current Outpatient Medications   Medication Sig Dispense Refill    predniSONE (DELTASONE) 20 MG tablet 2 tabs qd x 3 days  1 tab qd x 3 days 0.5 tab qd x 3 days 11 tablet 0    levofloxacin (LEVAQUIN) 500 MG tablet Take 1 tablet by mouth daily for 10 days 10 tablet 0    albuterol sulfate  (90 Base) MCG/ACT inhaler Inhale 2 puffs into the lungs 4 times daily as needed for Wheezing 1 Inhaler 0    pantoprazole (PROTONIX) 40 MG tablet TAKE ONE TABLET BY MOUTH DAILY 30 tablet 4    sulfaSALAzine (AZULFIDINE) 500 MG tablet       Fluticasone Furoate-Vilanterol (BREO ELLIPTA) 200-25 MCG/INH AEPB Inhale 1 puff into the lungs daily 2 each 5    montelukast (SINGULAIR) 10 MG tablet Take 1 tablet by mouth nightly 90 tablet 3    meloxicam (MOBIC) 15 MG tablet TAKE ONE TABLET BY MOUTH EVERY DAY with evening meal  5    Cholecalciferol (VITAMIN D) 2000 units CAPS capsule Take by mouth      cetirizine (ZYRTEC) 10 MG tablet Take 10 mg by mouth daily      hydroxychloroquine (PLAQUENIL) 200 MG tablet Take 2 tablets by mouth daily 60 tablet 0    predniSONE (DELTASONE) 5 MG tablet Take 1 tablet by mouth daily 30 tablet 0     No current facility-administered medications for this visit. Allergies   Allergen Reactions    Penicillins        Social History     Socioeconomic History    Marital status:      Spouse name: None    Number of children: None    Years of education: None    Highest education level: None   Occupational History    None   Social Needs    Financial resource strain: None    Food insecurity:     Worry: None     Inability: None    Transportation needs:     Medical: None     Non-medical: None   Tobacco Use    Smoking status: Never Smoker    Smokeless tobacco: Never Used   Substance and Sexual Activity    Alcohol use: Yes     Comment: social    Drug use: No    Sexual activity: None   Lifestyle    Physical activity:     Days per week: None     Minutes per session: None    Stress: None   Relationships    Social connections:     Talks on phone: None     Gets together: None     Attends [employer] service: None     Active member of club or organization: None     Attends meetings of clubs or organizations: None     Relationship status: None    Intimate partner violence:     Fear of current or ex partner: None     Emotionally abused: None     Physically abused: None     Forced sexual activity: None   Other Topics Concern    None   Social History Narrative    None       No family history on file. Review of Systems   Constitutional: Negative. HENT: Negative for congestion, facial swelling, hearing loss, nosebleeds, sinus pressure and sore throat. Eyes: Negative for photophobia and visual disturbance. Respiratory: Positive for cough and wheezing. Negative for apnea, chest tightness and shortness of breath. Cardiovascular: Negative for chest pain, palpitations and leg swelling. Gastrointestinal: Negative for abdominal pain, blood in stool, diarrhea, nausea and vomiting.    Genitourinary: Negative for difficulty urinating, frequency and urgency. Musculoskeletal: Negative for arthralgias, back pain, joint swelling and myalgias. Skin: Negative for color change and rash. Allergic/Immunologic: Negative. Neurological: Negative for syncope, weakness, light-headedness and headaches. Hematological: Negative. Psychiatric/Behavioral: Negative for agitation, behavioral problems, confusion and self-injury. The patient is not nervous/anxious. All other systems reviewed and are negative. Physical Exam   Constitutional: She is oriented to person, place, and time. She appears well-developed and well-nourished. No distress. HENT:   Head: Normocephalic and atraumatic. Nose: Nose normal.   Mouth/Throat: Oropharynx is clear and moist.   Eyes: Pupils are equal, round, and reactive to light. Conjunctivae and EOM are normal.   Neck: Normal range of motion. Neck supple. No JVD present. No thyromegaly present. Cardiovascular: Normal rate, regular rhythm and normal heart sounds. Exam reveals no gallop and no friction rub. No murmur heard. Pulmonary/Chest: Effort normal. No respiratory distress. She has wheezes. She has rhonchi. Abdominal: Soft. Bowel sounds are normal. She exhibits no distension. There is no tenderness. There is no rebound and no guarding. Musculoskeletal: Normal range of motion. Lymphadenopathy:     She has no cervical adenopathy. Neurological: She is alert and oriented to person, place, and time. She has normal reflexes. No cranial nerve deficit. She exhibits normal muscle tone. Coordination normal.   Skin: Skin is warm and dry. No rash noted. No erythema. Psychiatric: She has a normal mood and affect. Her behavior is normal. Judgment normal.   Nursing note and vitals reviewed. ASSESSMENT/PLAN:    Za Judd was seen today for cough and wheezing.     Diagnoses and all orders for this visit:    Moderate persistent asthmatic bronchitis with acute exacerbation  -     predniSONE (DELTASONE) 20 MG tablet; 2 tabs qd x 3 days  1 tab qd x 3 days 0.5 tab qd x 3 days  -     levofloxacin (LEVAQUIN) 500 MG tablet; Take 1 tablet by mouth daily for 10 days  -     Sleep Study with PAP Titration; Future  -     XR CHEST STANDARD (2 VW); Future    ASHLIE (obstructive sleep apnea)  -     Sleep Study with PAP Titration; Future  -     XR CHEST STANDARD (2 VW);  Future            Helga Miller DO    4/9/2019  9:51 AM

## 2019-04-14 ENCOUNTER — HOSPITAL ENCOUNTER (EMERGENCY)
Age: 54
Discharge: HOME | End: 2019-04-14
Payer: COMMERCIAL

## 2019-04-14 ENCOUNTER — APPOINTMENT (OUTPATIENT)
Dept: GENERAL RADIOLOGY | Age: 54
End: 2019-04-14
Payer: COMMERCIAL

## 2019-04-14 VITALS
HEART RATE: 89 BPM | HEIGHT: 70 IN | WEIGHT: 280 LBS | OXYGEN SATURATION: 95 % | SYSTOLIC BLOOD PRESSURE: 133 MMHG | RESPIRATION RATE: 16 BRPM | TEMPERATURE: 98 F | BODY MASS INDEX: 40.09 KG/M2 | DIASTOLIC BLOOD PRESSURE: 57 MMHG

## 2019-04-14 DIAGNOSIS — R11.2 NAUSEA AND VOMITING, INTRACTABILITY OF VOMITING NOT SPECIFIED, UNSPECIFIED VOMITING TYPE: Primary | ICD-10-CM

## 2019-04-14 DIAGNOSIS — E86.0 DEHYDRATION: ICD-10-CM

## 2019-04-14 DIAGNOSIS — J98.11 ATELECTASIS: ICD-10-CM

## 2019-04-14 LAB
ALBUMIN SERPL-MCNC: 4.3 G/DL (ref 3.5–5.2)
ALP BLD-CCNC: 65 U/L (ref 35–104)
ALT SERPL-CCNC: 19 U/L (ref 0–32)
ANION GAP SERPL CALCULATED.3IONS-SCNC: 14 MMOL/L (ref 7–16)
AST SERPL-CCNC: 23 U/L (ref 0–31)
BACTERIA: ABNORMAL /HPF
BASOPHILS ABSOLUTE: 0.02 E9/L (ref 0–0.2)
BASOPHILS RELATIVE PERCENT: 0.3 % (ref 0–2)
BILIRUB SERPL-MCNC: 0.9 MG/DL (ref 0–1.2)
BILIRUBIN URINE: ABNORMAL
BLOOD, URINE: ABNORMAL
BUN BLDV-MCNC: 22 MG/DL (ref 6–20)
CALCIUM SERPL-MCNC: 9.4 MG/DL (ref 8.6–10.2)
CHLORIDE BLD-SCNC: 100 MMOL/L (ref 98–107)
CLARITY: CLEAR
CO2: 28 MMOL/L (ref 22–29)
COLOR: YELLOW
CREAT SERPL-MCNC: 0.6 MG/DL (ref 0.5–1)
EOSINOPHILS ABSOLUTE: 0.1 E9/L (ref 0.05–0.5)
EOSINOPHILS RELATIVE PERCENT: 1.6 % (ref 0–6)
GFR AFRICAN AMERICAN: >60
GFR NON-AFRICAN AMERICAN: >60 ML/MIN/1.73
GLUCOSE BLD-MCNC: 78 MG/DL (ref 74–99)
GLUCOSE URINE: NEGATIVE MG/DL
HCT VFR BLD CALC: 46.8 % (ref 34–48)
HEMOGLOBIN: 14.4 G/DL (ref 11.5–15.5)
IMMATURE GRANULOCYTES #: 0.03 E9/L
IMMATURE GRANULOCYTES %: 0.5 % (ref 0–5)
INFLUENZA A BY PCR: NOT DETECTED
INFLUENZA B BY PCR: NOT DETECTED
KETONES, URINE: >=80 MG/DL
LACTIC ACID: 1.1 MMOL/L (ref 0.5–2.2)
LEUKOCYTE ESTERASE, URINE: ABNORMAL
LIPASE: 17 U/L (ref 13–60)
LYMPHOCYTES ABSOLUTE: 1.02 E9/L (ref 1.5–4)
LYMPHOCYTES RELATIVE PERCENT: 15.9 % (ref 20–42)
MCH RBC QN AUTO: 29.1 PG (ref 26–35)
MCHC RBC AUTO-ENTMCNC: 30.8 % (ref 32–34.5)
MCV RBC AUTO: 94.7 FL (ref 80–99.9)
MONOCYTES ABSOLUTE: 0.61 E9/L (ref 0.1–0.95)
MONOCYTES RELATIVE PERCENT: 9.5 % (ref 2–12)
MUCUS: PRESENT
NEUTROPHILS ABSOLUTE: 4.63 E9/L (ref 1.8–7.3)
NEUTROPHILS RELATIVE PERCENT: 72.2 % (ref 43–80)
NITRITE, URINE: NEGATIVE
PDW BLD-RTO: 13.9 FL (ref 11.5–15)
PH UA: 6 (ref 5–9)
PLATELET # BLD: 261 E9/L (ref 130–450)
PMV BLD AUTO: 10.6 FL (ref 7–12)
POTASSIUM REFLEX MAGNESIUM: 3.8 MMOL/L (ref 3.5–5)
PROTEIN UA: ABNORMAL MG/DL
RBC # BLD: 4.94 E12/L (ref 3.5–5.5)
RBC UA: ABNORMAL /HPF (ref 0–2)
SODIUM BLD-SCNC: 142 MMOL/L (ref 132–146)
SPECIFIC GRAVITY UA: >=1.03 (ref 1–1.03)
TOTAL PROTEIN: 7.4 G/DL (ref 6.4–8.3)
UROBILINOGEN, URINE: 1 E.U./DL
WBC # BLD: 6.4 E9/L (ref 4.5–11.5)
WBC UA: ABNORMAL /HPF (ref 0–5)

## 2019-04-14 PROCEDURE — 87088 URINE BACTERIA CULTURE: CPT

## 2019-04-14 PROCEDURE — 87502 INFLUENZA DNA AMP PROBE: CPT

## 2019-04-14 PROCEDURE — 2580000003 HC RX 258: Performed by: NURSE PRACTITIONER

## 2019-04-14 PROCEDURE — 94664 DEMO&/EVAL PT USE INHALER: CPT

## 2019-04-14 PROCEDURE — 71046 X-RAY EXAM CHEST 2 VIEWS: CPT

## 2019-04-14 PROCEDURE — 80053 COMPREHEN METABOLIC PANEL: CPT

## 2019-04-14 PROCEDURE — 81001 URINALYSIS AUTO W/SCOPE: CPT

## 2019-04-14 PROCEDURE — 85025 COMPLETE CBC W/AUTO DIFF WBC: CPT

## 2019-04-14 PROCEDURE — 2500000003 HC RX 250 WO HCPCS: Performed by: NURSE PRACTITIONER

## 2019-04-14 PROCEDURE — 6370000000 HC RX 637 (ALT 250 FOR IP): Performed by: NURSE PRACTITIONER

## 2019-04-14 PROCEDURE — 96374 THER/PROPH/DIAG INJ IV PUSH: CPT

## 2019-04-14 PROCEDURE — 83690 ASSAY OF LIPASE: CPT

## 2019-04-14 PROCEDURE — 99284 EMERGENCY DEPT VISIT MOD MDM: CPT

## 2019-04-14 PROCEDURE — 36415 COLL VENOUS BLD VENIPUNCTURE: CPT

## 2019-04-14 PROCEDURE — 6360000002 HC RX W HCPCS: Performed by: NURSE PRACTITIONER

## 2019-04-14 PROCEDURE — 83605 ASSAY OF LACTIC ACID: CPT

## 2019-04-14 PROCEDURE — 96361 HYDRATE IV INFUSION ADD-ON: CPT

## 2019-04-14 PROCEDURE — 96375 TX/PRO/DX INJ NEW DRUG ADDON: CPT

## 2019-04-14 RX ORDER — METOCLOPRAMIDE HYDROCHLORIDE 5 MG/ML
10 INJECTION INTRAMUSCULAR; INTRAVENOUS ONCE
Status: COMPLETED | OUTPATIENT
Start: 2019-04-14 | End: 2019-04-14

## 2019-04-14 RX ORDER — 0.9 % SODIUM CHLORIDE 0.9 %
1000 INTRAVENOUS SOLUTION INTRAVENOUS ONCE
Status: COMPLETED | OUTPATIENT
Start: 2019-04-14 | End: 2019-04-14

## 2019-04-14 RX ORDER — IPRATROPIUM BROMIDE AND ALBUTEROL SULFATE 2.5; .5 MG/3ML; MG/3ML
1 SOLUTION RESPIRATORY (INHALATION) ONCE
Status: COMPLETED | OUTPATIENT
Start: 2019-04-14 | End: 2019-04-14

## 2019-04-14 RX ORDER — BROMPHENIRAMINE MALEATE, PSEUDOEPHEDRINE HYDROCHLORIDE, AND DEXTROMETHORPHAN HYDROBROMIDE 2; 30; 10 MG/5ML; MG/5ML; MG/5ML
5 SYRUP ORAL 4 TIMES DAILY PRN
Qty: 118 ML | Refills: 0 | Status: SHIPPED | OUTPATIENT
Start: 2019-04-14 | End: 2019-11-04 | Stop reason: ALTCHOICE

## 2019-04-14 RX ORDER — ACETAMINOPHEN 500 MG
1000 TABLET ORAL ONCE
Status: COMPLETED | OUTPATIENT
Start: 2019-04-14 | End: 2019-04-14

## 2019-04-14 RX ORDER — ONDANSETRON 2 MG/ML
4 INJECTION INTRAMUSCULAR; INTRAVENOUS ONCE
Status: COMPLETED | OUTPATIENT
Start: 2019-04-14 | End: 2019-04-14

## 2019-04-14 RX ORDER — ONDANSETRON 4 MG/1
4 TABLET, ORALLY DISINTEGRATING ORAL EVERY 8 HOURS PRN
Qty: 10 TABLET | Refills: 0 | Status: SHIPPED | OUTPATIENT
Start: 2019-04-14 | End: 2019-11-04 | Stop reason: ALTCHOICE

## 2019-04-14 RX ADMIN — ONDANSETRON 4 MG: 2 INJECTION INTRAMUSCULAR; INTRAVENOUS at 09:46

## 2019-04-14 RX ADMIN — FAMOTIDINE 20 MG: 10 INJECTION, SOLUTION INTRAVENOUS at 09:46

## 2019-04-14 RX ADMIN — METOCLOPRAMIDE 10 MG: 5 INJECTION, SOLUTION INTRAMUSCULAR; INTRAVENOUS at 12:10

## 2019-04-14 RX ADMIN — SODIUM CHLORIDE 1000 ML: 9 INJECTION, SOLUTION INTRAVENOUS at 09:46

## 2019-04-14 RX ADMIN — SODIUM CHLORIDE 1000 ML: 9 INJECTION, SOLUTION INTRAVENOUS at 11:02

## 2019-04-14 RX ADMIN — IPRATROPIUM BROMIDE AND ALBUTEROL SULFATE 1 AMPULE: .5; 3 SOLUTION RESPIRATORY (INHALATION) at 10:24

## 2019-04-14 RX ADMIN — ACETAMINOPHEN 1000 MG: 500 TABLET ORAL at 11:35

## 2019-04-14 ASSESSMENT — PAIN SCALES - GENERAL
PAINLEVEL_OUTOF10: 8
PAINLEVEL_OUTOF10: 4

## 2019-04-14 ASSESSMENT — PAIN DESCRIPTION - PROGRESSION: CLINICAL_PROGRESSION: GRADUALLY IMPROVING

## 2019-04-14 NOTE — ED PROVIDER NOTES
[facility]     Department of Emergency Medicine   ED  Provider Note  Admit Date/RoomTime: 4/14/2019  9:12 AM  ED Room: 20/20  Chief Complaint   Emesis (3 days)    History of Present Illness   Source of history provided by:  patient. History/Exam Limitations: none. Marion Gaytan is a 53 y.o. old female with a past medical history of   Past Medical History:   Diagnosis Date    Arthritis     Asthma     presents to the emergency department by private vehicle, with complaints of sudden onset nausea and vomiting of bile or undigested food which began several day(s) prior to arrival.  There has been no similar episodes in the past. The symptoms are associated with anorexia. The symptoms are aggravated by eating and liquids and relieved by nothing. There has been no additional symptoms of abdominal pain, cramping or chills. States that she is currently being treated by her primary care physician for bronchitis. Patient states that she just started taking Levaquin several days ago and her symptoms started then. Patient denies any chest pain or shortness of breath. ROS    Pertinent positives and negatives are stated within HPI, all other systems reviewed and are negative. Past Surgical History:   Procedure Laterality Date    CHOLECYSTECTOMY      CYSTOSCOPY  01/10/2015    uretroscopy,lithotripsy,stent placement    ENDOMETRIAL ABLATION     Social History:  reports that she has never smoked. She has never used smokeless tobacco. She reports that she drinks alcohol. She reports that she does not use drugs. Family History: family history is not on file. Allergies: Penicillins    Physical Exam           ED Triage Vitals [04/14/19 0916]   BP Temp Temp Source Pulse Resp SpO2 Height Weight   (!) 178/74 98 °F (36.7 °C) Oral 103 16 100 % 5' 10\" (1.778 m) 280 lb (127 kg)      Oxygen Saturation Interpretation: Normal.    Constitutional:  Alert, development consistent with age. HEENT:  NC/NT.   Airway 30.8 (L) 32.0 - 34.5 %    RDW 13.9 11.5 - 15.0 fL    Platelets 994 502 - 006 E9/L    MPV 10.6 7.0 - 12.0 fL    Neutrophils % 72.2 43.0 - 80.0 %    Immature Granulocytes % 0.5 0.0 - 5.0 %    Lymphocytes % 15.9 (L) 20.0 - 42.0 %    Monocytes % 9.5 2.0 - 12.0 %    Eosinophils % 1.6 0.0 - 6.0 %    Basophils % 0.3 0.0 - 2.0 %    Neutrophils # 4.63 1.80 - 7.30 E9/L    Immature Granulocytes # 0.03 E9/L    Lymphocytes # 1.02 (L) 1.50 - 4.00 E9/L    Monocytes # 0.61 0.10 - 0.95 E9/L    Eosinophils # 0.10 0.05 - 0.50 E9/L    Basophils # 0.02 0.00 - 0.20 E9/L   Comprehensive Metabolic Panel w/ Reflex to MG   Result Value Ref Range    Sodium 142 132 - 146 mmol/L    Potassium reflex Magnesium 3.8 3.5 - 5.0 mmol/L    Chloride 100 98 - 107 mmol/L    CO2 28 22 - 29 mmol/L    Anion Gap 14 7 - 16 mmol/L    Glucose 78 74 - 99 mg/dL    BUN 22 (H) 6 - 20 mg/dL    CREATININE 0.6 0.5 - 1.0 mg/dL    GFR Non-African American >60 >=60 mL/min/1.73    GFR African American >60     Calcium 9.4 8.6 - 10.2 mg/dL    Total Protein 7.4 6.4 - 8.3 g/dL    Alb 4.3 3.5 - 5.2 g/dL    Total Bilirubin 0.9 0.0 - 1.2 mg/dL    Alkaline Phosphatase 65 35 - 104 U/L    ALT 19 0 - 32 U/L    AST 23 0 - 31 U/L   Lipase   Result Value Ref Range    Lipase 17 13 - 60 U/L   Lactic Acid, Plasma   Result Value Ref Range    Lactic Acid 1.1 0.5 - 2.2 mmol/L   Microscopic Urinalysis   Result Value Ref Range    Mucus, UA Present     WBC, UA 1-3 0 - 5 /HPF    RBC, UA 1-3 0 - 2 /HPF    Bacteria, UA FEW (A) /HPF     Imaging: All Radiology results interpreted by Radiologist unless otherwise noted.   XR CHEST STANDARD (2 VW)   Final Result      Minimal right basilar subsegmental atelectasis           ED Course / Medical Decision Making     Medications   0.9 % sodium chloride bolus (0 mLs Intravenous Stopped 4/14/19 1133)   ipratropium-albuterol (DUONEB) nebulizer solution 1 ampule (1 ampule Inhalation Given 4/14/19 1024)   ondansetron (ZOFRAN) injection 4 mg (4 mg Intravenous Given 4/14/19 0946)   famotidine (PEPCID) injection 20 mg (20 mg Intravenous Given 4/14/19 0946)   0.9 % sodium chloride bolus (0 mLs Intravenous Stopped 4/14/19 1306)   acetaminophen (TYLENOL) tablet 1,000 mg (1,000 mg Oral Given 4/14/19 1135)   metoclopramide (REGLAN) injection 10 mg (10 mg Intravenous Given 4/14/19 1210)        Re-examination:  4/14/19       Time:1300   Patient symptoms have improved patient is able to tolerate fluids and denies any nausea at this time. Patient has had no vomiting,. In the ed    Consult(s):   none. Procedure(s):   none    MDM:   At this time the patient is without objective evidence of an acute process requiring hospitalization or inpatient management. They have remained hemodynamically stable throughout their entire ED visit and are stable for discharge with outpatient follow-up. The plan has been discussed in detail and they are aware of the specific conditions for emergent return, as well as the importance of follow-up. Counseling: The emergency provider has spoken with the patient and discussed todays results, in addition to providing specific details for the plan of care and counseling regarding the diagnosis and prognosis. Questions are answered at this time and they are agreeable with the plan. Assessment     1. Nausea and vomiting, intractability of vomiting not specified, unspecified vomiting type    2. Atelectasis    3.  Dehydration      Plan   Discharge to home  Patient condition is good    New Medications     Discharge Medication List as of 4/14/2019 11:56 AM      START taking these medications    Details   ondansetron (ZOFRAN ODT) 4 MG disintegrating tablet Take 1 tablet by mouth every 8 hours as needed for Nausea or Vomiting, Disp-10 tablet, R-0Print      brompheniramine-pseudoephedrine-DM (BROMFED DM) 2-30-10 MG/5ML syrup Take 5 mLs by mouth 4 times daily as needed for Congestion or Cough, Disp-118 mL, R-0Print           Electronically signed by AMIRAH Taveras CNP   DD: 4/14/19  **This report was transcribed using voice recognition software. Every effort was made to ensure accuracy; however, inadvertent computerized transcription errors may be present.   END OF ED PROVIDER NOTE      AMIRAH Taveras CNP  04/16/19 5214

## 2019-04-15 RX ORDER — MONTELUKAST SODIUM 10 MG/1
10 TABLET ORAL NIGHTLY
Qty: 30 TABLET | Refills: 2 | Status: SHIPPED | OUTPATIENT
Start: 2019-04-15 | End: 2019-07-25 | Stop reason: SDUPTHER

## 2019-04-16 ENCOUNTER — OFFICE VISIT (OUTPATIENT)
Dept: FAMILY MEDICINE CLINIC | Age: 54
End: 2019-04-16
Payer: COMMERCIAL

## 2019-04-16 VITALS
RESPIRATION RATE: 16 BRPM | DIASTOLIC BLOOD PRESSURE: 78 MMHG | SYSTOLIC BLOOD PRESSURE: 124 MMHG | BODY MASS INDEX: 40.09 KG/M2 | HEIGHT: 70 IN | WEIGHT: 280 LBS | TEMPERATURE: 97.9 F | HEART RATE: 86 BPM | OXYGEN SATURATION: 96 %

## 2019-04-16 DIAGNOSIS — R11.2 NAUSEA AND VOMITING, INTRACTABILITY OF VOMITING NOT SPECIFIED, UNSPECIFIED VOMITING TYPE: ICD-10-CM

## 2019-04-16 DIAGNOSIS — J06.9 VIRAL URI: Primary | ICD-10-CM

## 2019-04-16 LAB — URINE CULTURE, ROUTINE: NORMAL

## 2019-04-16 PROCEDURE — 99213 OFFICE O/P EST LOW 20 MIN: CPT | Performed by: FAMILY MEDICINE

## 2019-04-16 ASSESSMENT — ENCOUNTER SYMPTOMS
SINUS PRESSURE: 0
DIARRHEA: 0
RHINORRHEA: 1
ALLERGIC/IMMUNOLOGIC NEGATIVE: 1
SORE THROAT: 0
SHORTNESS OF BREATH: 0
NAUSEA: 1
APNEA: 0
COLOR CHANGE: 0
COUGH: 0
CHEST TIGHTNESS: 0
VOMITING: 1
BACK PAIN: 0
WHEEZING: 0
BLOOD IN STOOL: 0
FACIAL SWELLING: 0
ABDOMINAL PAIN: 0
PHOTOPHOBIA: 0

## 2019-04-16 ASSESSMENT — PATIENT HEALTH QUESTIONNAIRE - PHQ9
SUM OF ALL RESPONSES TO PHQ QUESTIONS 1-9: 0
2. FEELING DOWN, DEPRESSED OR HOPELESS: 0
SUM OF ALL RESPONSES TO PHQ QUESTIONS 1-9: 0
SUM OF ALL RESPONSES TO PHQ9 QUESTIONS 1 & 2: 0
1. LITTLE INTEREST OR PLEASURE IN DOING THINGS: 0

## 2019-04-16 NOTE — PROGRESS NOTES
Chief Complaint:   Chief Complaint   Patient presents with    Cough     was in ER on 4/14/19, wasnt diagnosed with anything, was given some medication.  Emesis    Nausea       Cough   This is a new problem. The current episode started in the past 7 days. The problem has been gradually worsening. The problem occurs constantly. The cough is non-productive. Associated symptoms include rhinorrhea. Pertinent negatives include no chest pain, headaches, myalgias, rash, sore throat, shortness of breath or wheezing. Emesis    This is a new problem. The current episode started in the past 7 days. The problem occurs intermittently. The problem has been rapidly worsening. There has been no fever. Pertinent negatives include no abdominal pain, arthralgias, chest pain, coughing, diarrhea, headaches or myalgias.        Patient Active Problem List   Diagnosis    Arthritis    Asthma    Acute cystitis without hematuria    Viral URI    Bronchitis       Past Medical History:   Diagnosis Date    Arthritis     Asthma        Past Surgical History:   Procedure Laterality Date    CHOLECYSTECTOMY      CYSTOSCOPY  01/10/2015    uretroscopy,lithotripsy,stent placement    ENDOMETRIAL ABLATION         Current Outpatient Medications   Medication Sig Dispense Refill    montelukast (SINGULAIR) 10 MG tablet Take 1 tablet by mouth nightly 30 tablet 2    ondansetron (ZOFRAN ODT) 4 MG disintegrating tablet Take 1 tablet by mouth every 8 hours as needed for Nausea or Vomiting 10 tablet 0    brompheniramine-pseudoephedrine-DM (BROMFED DM) 2-30-10 MG/5ML syrup Take 5 mLs by mouth 4 times daily as needed for Congestion or Cough 118 mL 0    albuterol sulfate  (90 Base) MCG/ACT inhaler Inhale 2 puffs into the lungs 4 times daily as needed for Wheezing 1 Inhaler 0    pantoprazole (PROTONIX) 40 MG tablet TAKE ONE TABLET BY MOUTH DAILY 30 tablet 4    sulfaSALAzine (AZULFIDINE) 500 MG tablet       Fluticasone Furoate-Vilanterol (BREO ELLIPTA) 200-25 MCG/INH AEPB Inhale 1 puff into the lungs daily 2 each 5    meloxicam (MOBIC) 15 MG tablet TAKE ONE TABLET BY MOUTH EVERY DAY with evening meal  5    Cholecalciferol (VITAMIN D) 2000 units CAPS capsule Take by mouth      cetirizine (ZYRTEC) 10 MG tablet Take 10 mg by mouth daily      hydroxychloroquine (PLAQUENIL) 200 MG tablet Take 2 tablets by mouth daily 60 tablet 0     No current facility-administered medications for this visit. Allergies   Allergen Reactions    Penicillins        Social History     Socioeconomic History    Marital status:      Spouse name: None    Number of children: None    Years of education: None    Highest education level: None   Occupational History     Employer: [employer]   Social Needs    Financial resource strain: None    Food insecurity:     Worry: None     Inability: None    Transportation needs:     Medical: None     Non-medical: None   Tobacco Use    Smoking status: Never Smoker    Smokeless tobacco: Never Used   Substance and Sexual Activity    Alcohol use: Yes     Comment: socially    Drug use: No    Sexual activity: None   Lifestyle    Physical activity:     Days per week: None     Minutes per session: None    Stress: None   Relationships    Social connections:     Talks on phone: None     Gets together: None     Attends Confucianism service: None     Active member of club or organization: None     Attends meetings of clubs or organizations: None     Relationship status: None    Intimate partner violence:     Fear of current or ex partner: None     Emotionally abused: None     Physically abused: None     Forced sexual activity: None   Other Topics Concern    None   Social History Narrative    None       No family history on file. Review of Systems   Constitutional: Negative. HENT: Positive for rhinorrhea.  Negative for congestion, facial swelling, hearing loss, nosebleeds, sinus pressure and sore throat. Eyes: Negative for photophobia and visual disturbance. Respiratory: Negative for apnea, cough, chest tightness, shortness of breath and wheezing. Cardiovascular: Negative for chest pain, palpitations and leg swelling. Gastrointestinal: Positive for nausea and vomiting. Negative for abdominal pain, blood in stool and diarrhea. Genitourinary: Negative for difficulty urinating, frequency and urgency. Musculoskeletal: Negative for arthralgias, back pain, joint swelling and myalgias. Skin: Negative for color change and rash. Allergic/Immunologic: Negative. Neurological: Negative for syncope, weakness, light-headedness and headaches. Hematological: Negative. Psychiatric/Behavioral: Negative for agitation, behavioral problems, confusion and self-injury. The patient is not nervous/anxious. All other systems reviewed and are negative. Physical Exam   Constitutional: She is oriented to person, place, and time. She appears well-developed and well-nourished. No distress. HENT:   Head: Normocephalic and atraumatic. Nose: Nose normal.   Mouth/Throat: Oropharynx is clear and moist.   Eyes: Pupils are equal, round, and reactive to light. Conjunctivae and EOM are normal.   Neck: Normal range of motion. Neck supple. No JVD present. No thyromegaly present. Cardiovascular: Normal rate, regular rhythm and normal heart sounds. Exam reveals no gallop and no friction rub. No murmur heard. Pulmonary/Chest: Effort normal. No respiratory distress. She has no wheezes. She has rhonchi. Abdominal: Soft. Bowel sounds are normal. She exhibits no distension. There is no tenderness. There is no rebound and no guarding. Musculoskeletal: Normal range of motion. Lymphadenopathy:     She has no cervical adenopathy. Neurological: She is alert and oriented to person, place, and time. She has normal reflexes. No cranial nerve deficit. She exhibits normal muscle tone.  Coordination normal. Skin: Skin is warm and dry. No rash noted. No erythema. Psychiatric: She has a normal mood and affect. Her behavior is normal. Judgment normal.   Nursing note and vitals reviewed. ASSESSMENT/PLAN:    Severino Fleming was seen today for cough, emesis and nausea. Diagnoses and all orders for this visit:    Viral URI    Nausea and vomiting, intractability of vomiting not specified, unspecified vomiting type      The current medical regimen is effective;  continue present plan and medications.         Mary Kate Gallagher DO    4/16/2019  1:04 PM

## 2019-05-02 DIAGNOSIS — J45.31 MILD PERSISTENT ASTHMATIC BRONCHITIS WITH ACUTE EXACERBATION: ICD-10-CM

## 2019-05-02 RX ORDER — FLUTICASONE FUROATE AND VILANTEROL 200; 25 UG/1; UG/1
1 POWDER RESPIRATORY (INHALATION) DAILY
Qty: 2 EACH | Refills: 5 | Status: SHIPPED
Start: 2019-05-02 | End: 2020-05-28 | Stop reason: ALTCHOICE

## 2019-07-25 RX ORDER — MONTELUKAST SODIUM 10 MG/1
10 TABLET ORAL NIGHTLY
Qty: 30 TABLET | Refills: 5 | Status: SHIPPED | OUTPATIENT
Start: 2019-07-25 | End: 2019-12-27 | Stop reason: SDUPTHER

## 2019-07-25 RX ORDER — PANTOPRAZOLE SODIUM 40 MG/1
40 TABLET, DELAYED RELEASE ORAL DAILY
Qty: 30 TABLET | Refills: 5 | Status: SHIPPED | OUTPATIENT
Start: 2019-07-25 | End: 2019-12-27 | Stop reason: SDUPTHER

## 2019-08-09 ENCOUNTER — HOSPITAL ENCOUNTER (OUTPATIENT)
Age: 54
Discharge: HOME | End: 2019-08-09
Payer: COMMERCIAL

## 2019-08-09 LAB
ALBUMIN SERPL-MCNC: 4.2 G/DL (ref 3.5–5.2)
ALP BLD-CCNC: 71 U/L (ref 35–104)
ALT SERPL-CCNC: 20 U/L (ref 0–32)
ANION GAP SERPL CALCULATED.3IONS-SCNC: 10 MMOL/L (ref 7–16)
AST SERPL-CCNC: 18 U/L (ref 0–31)
BASOPHILS ABSOLUTE: 0.05 E9/L (ref 0–0.2)
BASOPHILS RELATIVE PERCENT: 1.3 % (ref 0–2)
BILIRUB SERPL-MCNC: 0.4 MG/DL (ref 0–1.2)
BILIRUBIN DIRECT: <0.2 MG/DL (ref 0–0.3)
BILIRUBIN, INDIRECT: NORMAL MG/DL (ref 0–1)
BUN BLDV-MCNC: 16 MG/DL (ref 6–20)
C-REACTIVE PROTEIN: 0.8 MG/DL (ref 0–0.4)
CALCIUM SERPL-MCNC: 9.2 MG/DL (ref 8.6–10.2)
CHLORIDE BLD-SCNC: 105 MMOL/L (ref 98–107)
CO2: 30 MMOL/L (ref 22–29)
CREAT SERPL-MCNC: 0.6 MG/DL (ref 0.5–1)
EOSINOPHILS ABSOLUTE: 0.16 E9/L (ref 0.05–0.5)
EOSINOPHILS RELATIVE PERCENT: 4.1 % (ref 0–6)
GFR AFRICAN AMERICAN: >60
GFR NON-AFRICAN AMERICAN: >60 ML/MIN/1.73
GLUCOSE BLD-MCNC: 111 MG/DL (ref 74–99)
HCT VFR BLD CALC: 42.9 % (ref 34–48)
HEMOGLOBIN: 13.8 G/DL (ref 11.5–15.5)
IMMATURE GRANULOCYTES #: 0.01 E9/L
IMMATURE GRANULOCYTES %: 0.3 % (ref 0–5)
LYMPHOCYTES ABSOLUTE: 1.16 E9/L (ref 1.5–4)
LYMPHOCYTES RELATIVE PERCENT: 29.5 % (ref 20–42)
MCH RBC QN AUTO: 30.1 PG (ref 26–35)
MCHC RBC AUTO-ENTMCNC: 32.2 % (ref 32–34.5)
MCV RBC AUTO: 93.5 FL (ref 80–99.9)
MONOCYTES ABSOLUTE: 0.52 E9/L (ref 0.1–0.95)
MONOCYTES RELATIVE PERCENT: 13.2 % (ref 2–12)
NEUTROPHILS ABSOLUTE: 2.03 E9/L (ref 1.8–7.3)
NEUTROPHILS RELATIVE PERCENT: 51.6 % (ref 43–80)
PDW BLD-RTO: 13.4 FL (ref 11.5–15)
PLATELET # BLD: 226 E9/L (ref 130–450)
PMV BLD AUTO: 10.5 FL (ref 7–12)
POTASSIUM SERPL-SCNC: 4.2 MMOL/L (ref 3.5–5)
RBC # BLD: 4.59 E12/L (ref 3.5–5.5)
SEDIMENTATION RATE, ERYTHROCYTE: 5 MM/HR (ref 0–20)
SODIUM BLD-SCNC: 145 MMOL/L (ref 132–146)
TOTAL PROTEIN: 7 G/DL (ref 6.4–8.3)
WBC # BLD: 3.9 E9/L (ref 4.5–11.5)

## 2019-08-09 PROCEDURE — 86481 TB AG RESPONSE T-CELL SUSP: CPT

## 2019-08-09 PROCEDURE — 36415 COLL VENOUS BLD VENIPUNCTURE: CPT

## 2019-08-09 PROCEDURE — 85651 RBC SED RATE NONAUTOMATED: CPT

## 2019-08-09 PROCEDURE — 80053 COMPREHEN METABOLIC PANEL: CPT

## 2019-08-09 PROCEDURE — 82248 BILIRUBIN DIRECT: CPT

## 2019-08-09 PROCEDURE — 85025 COMPLETE CBC W/AUTO DIFF WBC: CPT

## 2019-08-09 PROCEDURE — 86140 C-REACTIVE PROTEIN: CPT

## 2019-08-12 LAB
COMMENT: NORMAL
REPORT: NORMAL

## 2019-08-14 ENCOUNTER — OFFICE VISIT (OUTPATIENT)
Dept: RHEUMATOLOGY | Age: 54
End: 2019-08-14
Payer: COMMERCIAL

## 2019-08-14 VITALS
SYSTOLIC BLOOD PRESSURE: 126 MMHG | TEMPERATURE: 97.6 F | BODY MASS INDEX: 38.48 KG/M2 | WEIGHT: 268.8 LBS | HEIGHT: 70 IN | HEART RATE: 85 BPM | DIASTOLIC BLOOD PRESSURE: 72 MMHG | OXYGEN SATURATION: 95 %

## 2019-08-14 DIAGNOSIS — M06.09 RHEUMATOID ARTHRITIS OF MULTIPLE SITES WITH NEGATIVE RHEUMATOID FACTOR (HCC): ICD-10-CM

## 2019-08-14 PROCEDURE — 99214 OFFICE O/P EST MOD 30 MIN: CPT | Performed by: INTERNAL MEDICINE

## 2019-08-14 RX ORDER — PREDNISONE 1 MG/1
5 TABLET ORAL DAILY
Qty: 90 TABLET | Refills: 1 | Status: SHIPPED | OUTPATIENT
Start: 2019-08-14 | End: 2020-03-23 | Stop reason: SDUPTHER

## 2019-08-14 RX ORDER — HYDROXYCHLOROQUINE SULFATE 200 MG/1
400 TABLET, FILM COATED ORAL 2 TIMES DAILY
Qty: 180 TABLET | Refills: 1 | Status: SHIPPED | OUTPATIENT
Start: 2019-08-14 | End: 2019-12-27 | Stop reason: SDUPTHER

## 2019-08-14 RX ORDER — PREDNISONE 1 MG/1
TABLET ORAL
Refills: 1 | COMMUNITY
Start: 2019-07-22 | End: 2019-08-14 | Stop reason: SDUPTHER

## 2019-08-14 RX ORDER — MELOXICAM 15 MG/1
15 TABLET ORAL DAILY
Qty: 90 TABLET | Refills: 1 | Status: SHIPPED | OUTPATIENT
Start: 2019-08-14 | End: 2020-03-10

## 2019-10-21 RX ORDER — SULFASALAZINE 500 MG/1
1000 TABLET ORAL 2 TIMES DAILY
Qty: 120 TABLET | Refills: 1 | Status: SHIPPED | OUTPATIENT
Start: 2019-10-21 | End: 2019-12-27 | Stop reason: SDUPTHER

## 2019-11-04 ENCOUNTER — HOSPITAL ENCOUNTER (EMERGENCY)
Age: 54
Discharge: HOME | End: 2019-11-04
Attending: EMERGENCY MEDICINE
Payer: COMMERCIAL

## 2019-11-04 VITALS
HEIGHT: 68 IN | OXYGEN SATURATION: 98 % | HEART RATE: 79 BPM | TEMPERATURE: 97.7 F | BODY MASS INDEX: 40.92 KG/M2 | WEIGHT: 270 LBS | RESPIRATION RATE: 16 BRPM | DIASTOLIC BLOOD PRESSURE: 47 MMHG | SYSTOLIC BLOOD PRESSURE: 120 MMHG

## 2019-11-04 DIAGNOSIS — R42 VERTIGO: Primary | ICD-10-CM

## 2019-11-04 DIAGNOSIS — H81.12 BENIGN PAROXYSMAL POSITIONAL VERTIGO OF LEFT EAR: ICD-10-CM

## 2019-11-04 PROCEDURE — 6360000002 HC RX W HCPCS: Performed by: STUDENT IN AN ORGANIZED HEALTH CARE EDUCATION/TRAINING PROGRAM

## 2019-11-04 PROCEDURE — 99283 EMERGENCY DEPT VISIT LOW MDM: CPT

## 2019-11-04 PROCEDURE — 96374 THER/PROPH/DIAG INJ IV PUSH: CPT

## 2019-11-04 PROCEDURE — 6370000000 HC RX 637 (ALT 250 FOR IP): Performed by: STUDENT IN AN ORGANIZED HEALTH CARE EDUCATION/TRAINING PROGRAM

## 2019-11-04 PROCEDURE — 2580000003 HC RX 258: Performed by: STUDENT IN AN ORGANIZED HEALTH CARE EDUCATION/TRAINING PROGRAM

## 2019-11-04 RX ORDER — MECLIZINE HCL 12.5 MG/1
50 TABLET ORAL ONCE
Status: COMPLETED | OUTPATIENT
Start: 2019-11-04 | End: 2019-11-04

## 2019-11-04 RX ORDER — 0.9 % SODIUM CHLORIDE 0.9 %
1000 INTRAVENOUS SOLUTION INTRAVENOUS ONCE
Status: COMPLETED | OUTPATIENT
Start: 2019-11-04 | End: 2019-11-04

## 2019-11-04 RX ORDER — ONDANSETRON 2 MG/ML
4 INJECTION INTRAMUSCULAR; INTRAVENOUS ONCE
Status: COMPLETED | OUTPATIENT
Start: 2019-11-04 | End: 2019-11-04

## 2019-11-04 RX ORDER — MECLIZINE HYDROCHLORIDE 25 MG/1
25 TABLET ORAL 3 TIMES DAILY PRN
Qty: 30 TABLET | Refills: 0 | Status: SHIPPED | OUTPATIENT
Start: 2019-11-04 | End: 2019-11-14

## 2019-11-04 RX ADMIN — ONDANSETRON 4 MG: 2 INJECTION INTRAMUSCULAR; INTRAVENOUS at 12:09

## 2019-11-04 RX ADMIN — MECLIZINE 50 MG: 12.5 TABLET ORAL at 12:13

## 2019-11-04 RX ADMIN — SODIUM CHLORIDE 1000 ML: 9 INJECTION, SOLUTION INTRAVENOUS at 12:09

## 2019-11-04 ASSESSMENT — ENCOUNTER SYMPTOMS
DIARRHEA: 0
VOMITING: 1
SHORTNESS OF BREATH: 0
NAUSEA: 1
RHINORRHEA: 0
BLOOD IN STOOL: 0
CHEST TIGHTNESS: 0
WHEEZING: 0
SORE THROAT: 0
CONSTIPATION: 0
ABDOMINAL PAIN: 0
COUGH: 0
BACK PAIN: 0

## 2019-11-08 ENCOUNTER — OFFICE VISIT (OUTPATIENT)
Dept: PRIMARY CARE CLINIC | Age: 54
End: 2019-11-08
Payer: COMMERCIAL

## 2019-11-08 ENCOUNTER — TELEPHONE (OUTPATIENT)
Dept: PRIMARY CARE CLINIC | Age: 54
End: 2019-11-08

## 2019-11-08 VITALS
HEIGHT: 70 IN | WEIGHT: 293 LBS | HEART RATE: 76 BPM | OXYGEN SATURATION: 98 % | DIASTOLIC BLOOD PRESSURE: 80 MMHG | RESPIRATION RATE: 18 BRPM | BODY MASS INDEX: 41.95 KG/M2 | SYSTOLIC BLOOD PRESSURE: 130 MMHG

## 2019-11-08 DIAGNOSIS — Z23 NEED FOR PROPHYLACTIC VACCINATION AND INOCULATION AGAINST INFLUENZA: ICD-10-CM

## 2019-11-08 DIAGNOSIS — Z00.00 WELL ADULT EXAM: Primary | ICD-10-CM

## 2019-11-08 DIAGNOSIS — E78.5 HYPERLIPIDEMIA, UNSPECIFIED HYPERLIPIDEMIA TYPE: ICD-10-CM

## 2019-11-08 DIAGNOSIS — Z12.39 BREAST CANCER SCREENING: Primary | ICD-10-CM

## 2019-11-08 DIAGNOSIS — M06.09 RHEUMATOID ARTHRITIS OF MULTIPLE SITES WITH NEGATIVE RHEUMATOID FACTOR (HCC): ICD-10-CM

## 2019-11-08 DIAGNOSIS — N32.81 OAB (OVERACTIVE BLADDER): ICD-10-CM

## 2019-11-08 PROCEDURE — 90471 IMMUNIZATION ADMIN: CPT | Performed by: FAMILY MEDICINE

## 2019-11-08 PROCEDURE — 90686 IIV4 VACC NO PRSV 0.5 ML IM: CPT | Performed by: FAMILY MEDICINE

## 2019-11-08 PROCEDURE — 99396 PREV VISIT EST AGE 40-64: CPT | Performed by: FAMILY MEDICINE

## 2019-11-08 RX ORDER — SOLIFENACIN SUCCINATE 5 MG/1
5 TABLET, FILM COATED ORAL DAILY
Qty: 30 TABLET | Refills: 5 | Status: SHIPPED | OUTPATIENT
Start: 2019-11-08 | End: 2020-01-06 | Stop reason: SDUPTHER

## 2019-11-08 ASSESSMENT — ENCOUNTER SYMPTOMS
SHORTNESS OF BREATH: 0
CHEST TIGHTNESS: 0
DIARRHEA: 0
WHEEZING: 0
PHOTOPHOBIA: 0
SORE THROAT: 0
ALLERGIC/IMMUNOLOGIC NEGATIVE: 1
VOMITING: 0
APNEA: 0
ABDOMINAL PAIN: 0
NAUSEA: 0
COLOR CHANGE: 0
BACK PAIN: 0
COUGH: 0
SINUS PRESSURE: 0
FACIAL SWELLING: 0
BLOOD IN STOOL: 0

## 2019-11-09 ENCOUNTER — HOSPITAL ENCOUNTER (OUTPATIENT)
Age: 54
Discharge: HOME | End: 2019-11-11
Payer: COMMERCIAL

## 2019-11-09 DIAGNOSIS — M06.09 RHEUMATOID ARTHRITIS OF MULTIPLE SITES WITH NEGATIVE RHEUMATOID FACTOR (HCC): ICD-10-CM

## 2019-11-09 DIAGNOSIS — E78.5 HYPERLIPIDEMIA, UNSPECIFIED HYPERLIPIDEMIA TYPE: ICD-10-CM

## 2019-11-09 LAB
ALBUMIN SERPL-MCNC: 4.3 G/DL (ref 3.5–5.2)
ALP BLD-CCNC: 67 U/L (ref 35–104)
ALT SERPL-CCNC: 22 U/L (ref 0–32)
ANION GAP SERPL CALCULATED.3IONS-SCNC: 12 MMOL/L (ref 7–16)
AST SERPL-CCNC: 21 U/L (ref 0–31)
BASOPHILS ABSOLUTE: 0.05 E9/L (ref 0–0.2)
BASOPHILS RELATIVE PERCENT: 1.1 % (ref 0–2)
BILIRUB SERPL-MCNC: 0.6 MG/DL (ref 0–1.2)
BUN BLDV-MCNC: 18 MG/DL (ref 6–20)
C-REACTIVE PROTEIN: 0.4 MG/DL (ref 0–0.4)
CALCIUM SERPL-MCNC: 9.7 MG/DL (ref 8.6–10.2)
CHLORIDE BLD-SCNC: 105 MMOL/L (ref 98–107)
CHOLESTEROL, TOTAL: 170 MG/DL (ref 0–199)
CO2: 26 MMOL/L (ref 22–29)
CREAT SERPL-MCNC: 0.7 MG/DL (ref 0.5–1)
EOSINOPHILS ABSOLUTE: 0.17 E9/L (ref 0.05–0.5)
EOSINOPHILS RELATIVE PERCENT: 3.9 % (ref 0–6)
GFR AFRICAN AMERICAN: >60
GFR NON-AFRICAN AMERICAN: >60 ML/MIN/1.73
GLUCOSE BLD-MCNC: 108 MG/DL (ref 74–99)
HCT VFR BLD CALC: 44.8 % (ref 34–48)
HDLC SERPL-MCNC: 81 MG/DL
HEMOGLOBIN: 13.9 G/DL (ref 11.5–15.5)
IMMATURE GRANULOCYTES #: 0.01 E9/L
IMMATURE GRANULOCYTES %: 0.2 % (ref 0–5)
LDL CHOLESTEROL CALCULATED: 80 MG/DL (ref 0–99)
LYMPHOCYTES ABSOLUTE: 1.56 E9/L (ref 1.5–4)
LYMPHOCYTES RELATIVE PERCENT: 35.5 % (ref 20–42)
MCH RBC QN AUTO: 29.7 PG (ref 26–35)
MCHC RBC AUTO-ENTMCNC: 31 % (ref 32–34.5)
MCV RBC AUTO: 95.7 FL (ref 80–99.9)
MONOCYTES ABSOLUTE: 0.54 E9/L (ref 0.1–0.95)
MONOCYTES RELATIVE PERCENT: 12.3 % (ref 2–12)
NEUTROPHILS ABSOLUTE: 2.06 E9/L (ref 1.8–7.3)
NEUTROPHILS RELATIVE PERCENT: 47 % (ref 43–80)
PDW BLD-RTO: 13.3 FL (ref 11.5–15)
PLATELET # BLD: 216 E9/L (ref 130–450)
PMV BLD AUTO: 10.8 FL (ref 7–12)
POTASSIUM SERPL-SCNC: 5 MMOL/L (ref 3.5–5)
RBC # BLD: 4.68 E12/L (ref 3.5–5.5)
SEDIMENTATION RATE, ERYTHROCYTE: 5 MM/HR (ref 0–20)
SODIUM BLD-SCNC: 143 MMOL/L (ref 132–146)
TOTAL PROTEIN: 7.1 G/DL (ref 6.4–8.3)
TRIGL SERPL-MCNC: 46 MG/DL (ref 0–149)
TSH SERPL DL<=0.05 MIU/L-ACNC: 2.72 UIU/ML (ref 0.27–4.2)
VLDLC SERPL CALC-MCNC: 9 MG/DL
WBC # BLD: 4.4 E9/L (ref 4.5–11.5)

## 2019-11-09 PROCEDURE — 36415 COLL VENOUS BLD VENIPUNCTURE: CPT

## 2019-11-09 PROCEDURE — 86140 C-REACTIVE PROTEIN: CPT

## 2019-11-09 PROCEDURE — 80061 LIPID PANEL: CPT

## 2019-11-09 PROCEDURE — 84443 ASSAY THYROID STIM HORMONE: CPT

## 2019-11-09 PROCEDURE — 80053 COMPREHEN METABOLIC PANEL: CPT

## 2019-11-09 PROCEDURE — 85025 COMPLETE CBC W/AUTO DIFF WBC: CPT

## 2019-11-09 PROCEDURE — 85651 RBC SED RATE NONAUTOMATED: CPT

## 2019-11-15 ENCOUNTER — HOSPITAL ENCOUNTER (OUTPATIENT)
Dept: GENERAL RADIOLOGY | Age: 54
Discharge: HOME | End: 2019-11-17
Payer: COMMERCIAL

## 2019-11-15 DIAGNOSIS — Z12.39 BREAST CANCER SCREENING: ICD-10-CM

## 2019-11-15 PROCEDURE — 77063 BREAST TOMOSYNTHESIS BI: CPT

## 2019-12-04 ENCOUNTER — OFFICE VISIT (OUTPATIENT)
Dept: PODIATRY | Age: 54
End: 2019-12-04
Payer: COMMERCIAL

## 2019-12-04 VITALS
BODY MASS INDEX: 39.37 KG/M2 | HEIGHT: 70 IN | TEMPERATURE: 97.5 F | WEIGHT: 275 LBS | SYSTOLIC BLOOD PRESSURE: 132 MMHG | DIASTOLIC BLOOD PRESSURE: 84 MMHG

## 2019-12-04 DIAGNOSIS — M72.2 PLANTAR FASCIAL FIBROMATOSIS: ICD-10-CM

## 2019-12-04 DIAGNOSIS — M77.31 CALCANEAL SPUR OF BOTH FEET: ICD-10-CM

## 2019-12-04 DIAGNOSIS — M79.671 PAIN IN RIGHT FOOT: ICD-10-CM

## 2019-12-04 DIAGNOSIS — M77.32 CALCANEAL SPUR OF BOTH FEET: ICD-10-CM

## 2019-12-04 DIAGNOSIS — M65.28 CALCIFIC ACHILLES TENDINITIS OF BOTH LOWER EXTREMITIES: Primary | ICD-10-CM

## 2019-12-04 DIAGNOSIS — M79.672 PAIN IN LEFT FOOT: ICD-10-CM

## 2019-12-04 PROCEDURE — L4397 STATIC OR DYNAMI AFO PRE OTS: HCPCS | Performed by: PODIATRIST

## 2019-12-04 PROCEDURE — 99203 OFFICE O/P NEW LOW 30 MIN: CPT | Performed by: PODIATRIST

## 2019-12-04 PROCEDURE — L4360 PNEUMAT WALKING BOOT PRE CST: HCPCS | Performed by: PODIATRIST

## 2019-12-06 ENCOUNTER — TELEPHONE (OUTPATIENT)
Dept: PODIATRY | Age: 54
End: 2019-12-06

## 2019-12-09 ENCOUNTER — TELEPHONE (OUTPATIENT)
Dept: PODIATRY | Age: 54
End: 2019-12-09

## 2019-12-27 RX ORDER — PANTOPRAZOLE SODIUM 40 MG/1
40 TABLET, DELAYED RELEASE ORAL DAILY
Qty: 30 TABLET | Refills: 5 | Status: SHIPPED | OUTPATIENT
Start: 2019-12-27 | End: 2020-01-26 | Stop reason: SDUPTHER

## 2019-12-27 RX ORDER — MONTELUKAST SODIUM 10 MG/1
10 TABLET ORAL NIGHTLY
Qty: 30 TABLET | Refills: 5 | Status: SHIPPED | OUTPATIENT
Start: 2019-12-27 | End: 2020-01-26 | Stop reason: SDUPTHER

## 2019-12-27 RX ORDER — SULFASALAZINE 500 MG/1
1000 TABLET ORAL 2 TIMES DAILY
Qty: 120 TABLET | Refills: 1 | Status: SHIPPED
Start: 2019-12-27 | End: 2020-02-26 | Stop reason: SDUPTHER

## 2019-12-30 ENCOUNTER — OFFICE VISIT (OUTPATIENT)
Dept: FAMILY MEDICINE CLINIC | Age: 54
End: 2019-12-30
Payer: COMMERCIAL

## 2019-12-30 VITALS
DIASTOLIC BLOOD PRESSURE: 84 MMHG | HEART RATE: 75 BPM | RESPIRATION RATE: 20 BRPM | SYSTOLIC BLOOD PRESSURE: 130 MMHG | BODY MASS INDEX: 39.25 KG/M2 | HEIGHT: 70 IN | OXYGEN SATURATION: 97 % | WEIGHT: 274.2 LBS | TEMPERATURE: 97.8 F

## 2019-12-30 DIAGNOSIS — J20.9 ACUTE BRONCHITIS, UNSPECIFIED ORGANISM: ICD-10-CM

## 2019-12-30 DIAGNOSIS — J45.41 MODERATE PERSISTENT ASTHMATIC BRONCHITIS WITH ACUTE EXACERBATION: ICD-10-CM

## 2019-12-30 DIAGNOSIS — J06.9 UPPER RESPIRATORY TRACT INFECTION, UNSPECIFIED TYPE: Primary | ICD-10-CM

## 2019-12-30 PROCEDURE — 99214 OFFICE O/P EST MOD 30 MIN: CPT | Performed by: PHYSICIAN ASSISTANT

## 2019-12-30 RX ORDER — AZITHROMYCIN 250 MG/1
250 TABLET, FILM COATED ORAL SEE ADMIN INSTRUCTIONS
Qty: 6 TABLET | Refills: 0 | Status: SHIPPED | OUTPATIENT
Start: 2019-12-30 | End: 2020-01-04

## 2019-12-30 RX ORDER — PREDNISONE 20 MG/1
40 TABLET ORAL DAILY
Qty: 10 TABLET | Refills: 0 | Status: SHIPPED | OUTPATIENT
Start: 2019-12-30 | End: 2020-01-04

## 2020-01-27 RX ORDER — MONTELUKAST SODIUM 10 MG/1
10 TABLET ORAL NIGHTLY
Qty: 30 TABLET | Refills: 5 | Status: SHIPPED
Start: 2020-01-27 | End: 2021-02-17

## 2020-01-27 RX ORDER — PANTOPRAZOLE SODIUM 40 MG/1
40 TABLET, DELAYED RELEASE ORAL DAILY
Qty: 30 TABLET | Refills: 5 | Status: SHIPPED
Start: 2020-01-27 | End: 2020-07-31 | Stop reason: SDUPTHER

## 2020-02-10 RX ORDER — HYDROXYCHLOROQUINE SULFATE 200 MG/1
400 TABLET, FILM COATED ORAL 2 TIMES DAILY
Qty: 180 TABLET | Refills: 1 | Status: ON HOLD
Start: 2020-02-10 | End: 2020-07-15 | Stop reason: HOSPADM

## 2020-02-14 ENCOUNTER — OFFICE VISIT (OUTPATIENT)
Dept: PRIMARY CARE CLINIC | Age: 55
End: 2020-02-14
Payer: COMMERCIAL

## 2020-02-14 VITALS
BODY MASS INDEX: 39.8 KG/M2 | DIASTOLIC BLOOD PRESSURE: 88 MMHG | SYSTOLIC BLOOD PRESSURE: 130 MMHG | OXYGEN SATURATION: 98 % | HEART RATE: 82 BPM | HEIGHT: 70 IN | RESPIRATION RATE: 18 BRPM | WEIGHT: 278 LBS | TEMPERATURE: 97.4 F

## 2020-02-14 PROCEDURE — 99213 OFFICE O/P EST LOW 20 MIN: CPT | Performed by: FAMILY MEDICINE

## 2020-02-14 RX ORDER — PREDNISONE 20 MG/1
TABLET ORAL
Qty: 11 TABLET | Refills: 0 | Status: SHIPPED
Start: 2020-02-14 | End: 2020-03-10

## 2020-02-14 RX ORDER — DOXYCYCLINE HYCLATE 100 MG
100 TABLET ORAL 2 TIMES DAILY
Qty: 20 TABLET | Refills: 0 | Status: ON HOLD
Start: 2020-02-14 | End: 2020-02-23 | Stop reason: HOSPADM

## 2020-02-14 ASSESSMENT — ENCOUNTER SYMPTOMS
WHEEZING: 1
SHORTNESS OF BREATH: 0
ABDOMINAL PAIN: 0
COLOR CHANGE: 0
SORE THROAT: 0
BLOOD IN STOOL: 0
VOMITING: 0
ALLERGIC/IMMUNOLOGIC NEGATIVE: 1
FACIAL SWELLING: 0
NAUSEA: 0
BACK PAIN: 0
RHINORRHEA: 1
CHEST TIGHTNESS: 0
SINUS PRESSURE: 0
COUGH: 1
PHOTOPHOBIA: 0
APNEA: 0
DIARRHEA: 0

## 2020-02-14 ASSESSMENT — PATIENT HEALTH QUESTIONNAIRE - PHQ9
SUM OF ALL RESPONSES TO PHQ QUESTIONS 1-9: 0
1. LITTLE INTEREST OR PLEASURE IN DOING THINGS: 0
2. FEELING DOWN, DEPRESSED OR HOPELESS: 0
SUM OF ALL RESPONSES TO PHQ9 QUESTIONS 1 & 2: 0
SUM OF ALL RESPONSES TO PHQ QUESTIONS 1-9: 0

## 2020-02-14 NOTE — PROGRESS NOTES
(DELTASONE) 5 MG tablet Take 1 tablet by mouth daily 90 tablet 1    Fluticasone Furoate-Vilanterol (BREO ELLIPTA) 200-25 MCG/INH AEPB Inhale 1 puff into the lungs daily 2 each 5    albuterol sulfate  (90 Base) MCG/ACT inhaler Inhale 2 puffs into the lungs 4 times daily as needed for Wheezing 1 Inhaler 0    Cholecalciferol (VITAMIN D) 2000 units CAPS capsule Take by mouth      cetirizine (ZYRTEC) 10 MG tablet Take 10 mg by mouth daily       No current facility-administered medications for this visit. Allergies   Allergen Reactions    Penicillins        Social History     Socioeconomic History    Marital status:      Spouse name: None    Number of children: None    Years of education: None    Highest education level: None   Occupational History     Employer: [employer]   Social Needs    Financial resource strain: None    Food insecurity:     Worry: None     Inability: None    Transportation needs:     Medical: None     Non-medical: None   Tobacco Use    Smoking status: Never Smoker    Smokeless tobacco: Never Used   Substance and Sexual Activity    Alcohol use: Yes     Comment: socially    Drug use: No    Sexual activity: None   Lifestyle    Physical activity:     Days per week: None     Minutes per session: None    Stress: None   Relationships    Social connections:     Talks on phone: None     Gets together: None     Attends Mandaeism service: None     Active member of club or organization: None     Attends meetings of clubs or organizations: None     Relationship status: None    Intimate partner violence:     Fear of current or ex partner: None     Emotionally abused: None     Physically abused: None     Forced sexual activity: None   Other Topics Concern    None   Social History Narrative    None       No family history on file. Review of Systems   Constitutional: Negative. HENT: Positive for rhinorrhea.  Negative for congestion, facial swelling, hearing loss, nosebleeds, sinus pressure and sore throat. Eyes: Negative for photophobia and visual disturbance. Respiratory: Positive for cough and wheezing. Negative for apnea, chest tightness and shortness of breath. Cardiovascular: Negative for chest pain, palpitations and leg swelling. Gastrointestinal: Negative for abdominal pain, blood in stool, diarrhea, nausea and vomiting. Genitourinary: Negative for difficulty urinating, frequency and urgency. Musculoskeletal: Negative for arthralgias, back pain, joint swelling and myalgias. Skin: Negative for color change and rash. Allergic/Immunologic: Negative. Neurological: Negative for syncope, weakness, light-headedness and headaches. Hematological: Negative. Psychiatric/Behavioral: Negative for agitation, behavioral problems, confusion and self-injury. The patient is not nervous/anxious. All other systems reviewed and are negative. Physical Exam  Vitals signs and nursing note reviewed. Constitutional:       General: She is not in acute distress. Appearance: She is well-developed. HENT:      Head: Normocephalic and atraumatic. Nose: Nose normal.   Eyes:      Conjunctiva/sclera: Conjunctivae normal.      Pupils: Pupils are equal, round, and reactive to light. Neck:      Musculoskeletal: Normal range of motion and neck supple. Thyroid: No thyromegaly. Vascular: No JVD. Cardiovascular:      Rate and Rhythm: Normal rate and regular rhythm. Heart sounds: Normal heart sounds. No murmur. No friction rub. No gallop. Pulmonary:      Effort: Pulmonary effort is normal. No respiratory distress. Breath sounds: Wheezing and rhonchi present. Abdominal:      General: Bowel sounds are normal. There is no distension. Palpations: Abdomen is soft. Tenderness: There is no abdominal tenderness. There is no guarding or rebound. Musculoskeletal: Normal range of motion.    Lymphadenopathy:

## 2020-02-17 ENCOUNTER — HOSPITAL ENCOUNTER (INPATIENT)
Age: 55
LOS: 3 days | Discharge: HOME | DRG: 286 | End: 2020-02-23
Attending: EMERGENCY MEDICINE | Admitting: INTERNAL MEDICINE
Payer: COMMERCIAL

## 2020-02-17 ENCOUNTER — TELEPHONE (OUTPATIENT)
Dept: PRIMARY CARE CLINIC | Age: 55
End: 2020-02-17

## 2020-02-17 ENCOUNTER — APPOINTMENT (OUTPATIENT)
Dept: GENERAL RADIOLOGY | Age: 55
DRG: 286 | End: 2020-02-17
Payer: COMMERCIAL

## 2020-02-17 LAB
ALBUMIN SERPL-MCNC: 4.1 G/DL (ref 3.5–5.2)
ALP BLD-CCNC: 65 U/L (ref 35–104)
ALT SERPL-CCNC: 27 U/L (ref 0–32)
ANION GAP SERPL CALCULATED.3IONS-SCNC: 15 MMOL/L (ref 7–16)
AST SERPL-CCNC: 24 U/L (ref 0–31)
BASOPHILS ABSOLUTE: 0.03 E9/L (ref 0–0.2)
BASOPHILS RELATIVE PERCENT: 0.4 % (ref 0–2)
BILIRUB SERPL-MCNC: 0.5 MG/DL (ref 0–1.2)
BUN BLDV-MCNC: 19 MG/DL (ref 6–20)
CALCIUM SERPL-MCNC: 8.8 MG/DL (ref 8.6–10.2)
CHLORIDE BLD-SCNC: 98 MMOL/L (ref 98–107)
CO2: 26 MMOL/L (ref 22–29)
CREAT SERPL-MCNC: 0.6 MG/DL (ref 0.5–1)
EOSINOPHILS ABSOLUTE: 0.04 E9/L (ref 0.05–0.5)
EOSINOPHILS RELATIVE PERCENT: 0.5 % (ref 0–6)
GFR AFRICAN AMERICAN: >60
GFR NON-AFRICAN AMERICAN: >60 ML/MIN/1.73
GLUCOSE BLD-MCNC: 106 MG/DL (ref 74–99)
HCT VFR BLD CALC: 44.6 % (ref 34–48)
HEMOGLOBIN: 14.1 G/DL (ref 11.5–15.5)
IMMATURE GRANULOCYTES #: 0.01 E9/L
IMMATURE GRANULOCYTES %: 0.1 % (ref 0–5)
LACTIC ACID, SEPSIS: 1.3 MMOL/L (ref 0.5–1.9)
LYMPHOCYTES ABSOLUTE: 1.42 E9/L (ref 1.5–4)
LYMPHOCYTES RELATIVE PERCENT: 19.2 % (ref 20–42)
MCH RBC QN AUTO: 30 PG (ref 26–35)
MCHC RBC AUTO-ENTMCNC: 31.6 % (ref 32–34.5)
MCV RBC AUTO: 94.9 FL (ref 80–99.9)
MONOCYTES ABSOLUTE: 0.68 E9/L (ref 0.1–0.95)
MONOCYTES RELATIVE PERCENT: 9.2 % (ref 2–12)
NEUTROPHILS ABSOLUTE: 5.22 E9/L (ref 1.8–7.3)
NEUTROPHILS RELATIVE PERCENT: 70.6 % (ref 43–80)
PDW BLD-RTO: 12.8 FL (ref 11.5–15)
PLATELET # BLD: 216 E9/L (ref 130–450)
PMV BLD AUTO: 10.7 FL (ref 7–12)
POTASSIUM SERPL-SCNC: 3.2 MMOL/L (ref 3.5–5)
PRO-BNP: 1048 PG/ML (ref 0–125)
RBC # BLD: 4.7 E12/L (ref 3.5–5.5)
SODIUM BLD-SCNC: 139 MMOL/L (ref 132–146)
TOTAL PROTEIN: 7.2 G/DL (ref 6.4–8.3)
TROPONIN: <0.01 NG/ML (ref 0–0.03)
WBC # BLD: 7.4 E9/L (ref 4.5–11.5)

## 2020-02-17 PROCEDURE — 2580000003 HC RX 258: Performed by: STUDENT IN AN ORGANIZED HEALTH CARE EDUCATION/TRAINING PROGRAM

## 2020-02-17 PROCEDURE — 99285 EMERGENCY DEPT VISIT HI MDM: CPT

## 2020-02-17 PROCEDURE — 84484 ASSAY OF TROPONIN QUANT: CPT

## 2020-02-17 PROCEDURE — 80053 COMPREHEN METABOLIC PANEL: CPT

## 2020-02-17 PROCEDURE — 83605 ASSAY OF LACTIC ACID: CPT

## 2020-02-17 PROCEDURE — 96361 HYDRATE IV INFUSION ADD-ON: CPT

## 2020-02-17 PROCEDURE — 93005 ELECTROCARDIOGRAM TRACING: CPT | Performed by: STUDENT IN AN ORGANIZED HEALTH CARE EDUCATION/TRAINING PROGRAM

## 2020-02-17 PROCEDURE — 71045 X-RAY EXAM CHEST 1 VIEW: CPT

## 2020-02-17 PROCEDURE — 94760 N-INVAS EAR/PLS OXIMETRY 1: CPT

## 2020-02-17 PROCEDURE — 87040 BLOOD CULTURE FOR BACTERIA: CPT

## 2020-02-17 PROCEDURE — 85025 COMPLETE CBC W/AUTO DIFF WBC: CPT

## 2020-02-17 PROCEDURE — 83880 ASSAY OF NATRIURETIC PEPTIDE: CPT

## 2020-02-17 RX ORDER — AMIODARONE HYDROCHLORIDE 50 MG/ML
150 INJECTION, SOLUTION INTRAVENOUS ONCE
Status: COMPLETED | OUTPATIENT
Start: 2020-02-17 | End: 2020-02-18

## 2020-02-17 RX ORDER — SODIUM CHLORIDE 9 MG/ML
INJECTION, SOLUTION INTRAVENOUS
Status: DISPENSED
Start: 2020-02-17 | End: 2020-02-18

## 2020-02-17 RX ORDER — 0.9 % SODIUM CHLORIDE 0.9 %
1000 INTRAVENOUS SOLUTION INTRAVENOUS ONCE
Status: COMPLETED | OUTPATIENT
Start: 2020-02-17 | End: 2020-02-17

## 2020-02-17 RX ORDER — ONDANSETRON 4 MG/1
4 TABLET, ORALLY DISINTEGRATING ORAL EVERY 8 HOURS PRN
Qty: 20 TABLET | Refills: 0 | Status: ON HOLD
Start: 2020-02-17 | End: 2020-02-23 | Stop reason: HOSPADM

## 2020-02-17 RX ADMIN — SODIUM CHLORIDE 1000 ML: 9 INJECTION, SOLUTION INTRAVENOUS at 22:00

## 2020-02-18 PROBLEM — R00.0 TACHYCARDIA: Status: ACTIVE | Noted: 2020-02-18

## 2020-02-18 LAB
ALBUMIN SERPL-MCNC: 3.8 G/DL (ref 3.5–5.2)
ALP BLD-CCNC: 53 U/L (ref 35–104)
ALT SERPL-CCNC: 40 U/L (ref 0–32)
ANION GAP SERPL CALCULATED.3IONS-SCNC: 11 MMOL/L (ref 7–16)
AST SERPL-CCNC: 33 U/L (ref 0–31)
BASOPHILS ABSOLUTE: 0.02 E9/L (ref 0–0.2)
BASOPHILS RELATIVE PERCENT: 0.3 % (ref 0–2)
BILIRUB SERPL-MCNC: 0.7 MG/DL (ref 0–1.2)
BUN BLDV-MCNC: 17 MG/DL (ref 6–20)
CALCIUM SERPL-MCNC: 8.1 MG/DL (ref 8.6–10.2)
CHLORIDE BLD-SCNC: 104 MMOL/L (ref 98–107)
CK MB: 1.7 NG/ML (ref 0–4.3)
CO2: 24 MMOL/L (ref 22–29)
CREAT SERPL-MCNC: 0.5 MG/DL (ref 0.5–1)
D DIMER: <200 NG/ML DDU
EKG ATRIAL RATE: 137 BPM
EKG ATRIAL RATE: 68 BPM
EKG ATRIAL RATE: 95 BPM
EKG P AXIS: 72 DEGREES
EKG P AXIS: 76 DEGREES
EKG P-R INTERVAL: 184 MS
EKG P-R INTERVAL: 208 MS
EKG Q-T INTERVAL: 298 MS
EKG Q-T INTERVAL: 366 MS
EKG Q-T INTERVAL: 418 MS
EKG QRS DURATION: 148 MS
EKG QRS DURATION: 148 MS
EKG QRS DURATION: 166 MS
EKG QTC CALCULATION (BAZETT): 449 MS
EKG QTC CALCULATION (BAZETT): 525 MS
EKG QTC CALCULATION (BAZETT): 546 MS
EKG R AXIS: -7 DEGREES
EKG T AXIS: 104 DEGREES
EKG T AXIS: 111 DEGREES
EKG T AXIS: 86 DEGREES
EKG VENTRICULAR RATE: 134 BPM
EKG VENTRICULAR RATE: 137 BPM
EKG VENTRICULAR RATE: 95 BPM
EOSINOPHILS ABSOLUTE: 0.02 E9/L (ref 0.05–0.5)
EOSINOPHILS RELATIVE PERCENT: 0.3 % (ref 0–6)
GFR AFRICAN AMERICAN: >60
GFR NON-AFRICAN AMERICAN: >60 ML/MIN/1.73
GLUCOSE BLD-MCNC: 96 MG/DL (ref 74–99)
HCT VFR BLD CALC: 40.7 % (ref 34–48)
HEMOGLOBIN: 13.1 G/DL (ref 11.5–15.5)
IMMATURE GRANULOCYTES #: 0.03 E9/L
IMMATURE GRANULOCYTES %: 0.5 % (ref 0–5)
INFLUENZA A BY PCR: NOT DETECTED
INFLUENZA B BY PCR: DETECTED
LV EF: 43 %
LVEF MODALITY: NORMAL
LYMPHOCYTES ABSOLUTE: 0.96 E9/L (ref 1.5–4)
LYMPHOCYTES RELATIVE PERCENT: 14.9 % (ref 20–42)
MAGNESIUM: 1.5 MG/DL (ref 1.6–2.6)
MAGNESIUM: 2.5 MG/DL (ref 1.6–2.6)
MCH RBC QN AUTO: 30.5 PG (ref 26–35)
MCHC RBC AUTO-ENTMCNC: 32.2 % (ref 32–34.5)
MCV RBC AUTO: 94.7 FL (ref 80–99.9)
MONOCYTES ABSOLUTE: 0.49 E9/L (ref 0.1–0.95)
MONOCYTES RELATIVE PERCENT: 7.6 % (ref 2–12)
NEUTROPHILS ABSOLUTE: 4.92 E9/L (ref 1.8–7.3)
NEUTROPHILS RELATIVE PERCENT: 76.4 % (ref 43–80)
PDW BLD-RTO: 13.1 FL (ref 11.5–15)
PLATELET # BLD: 195 E9/L (ref 130–450)
PMV BLD AUTO: 10.7 FL (ref 7–12)
POTASSIUM SERPL-SCNC: 3.4 MMOL/L (ref 3.5–5)
RBC # BLD: 4.3 E12/L (ref 3.5–5.5)
SODIUM BLD-SCNC: 139 MMOL/L (ref 132–146)
TOTAL PROTEIN: 5.8 G/DL (ref 6.4–8.3)
TROPONIN: 0.01 NG/ML (ref 0–0.03)
TROPONIN: 0.04 NG/ML (ref 0–0.03)
TSH SERPL DL<=0.05 MIU/L-ACNC: 1.77 UIU/ML (ref 0.27–4.2)
WBC # BLD: 6.4 E9/L (ref 4.5–11.5)

## 2020-02-18 PROCEDURE — 96372 THER/PROPH/DIAG INJ SC/IM: CPT

## 2020-02-18 PROCEDURE — 93005 ELECTROCARDIOGRAM TRACING: CPT | Performed by: STUDENT IN AN ORGANIZED HEALTH CARE EDUCATION/TRAINING PROGRAM

## 2020-02-18 PROCEDURE — 2500000003 HC RX 250 WO HCPCS: Performed by: STUDENT IN AN ORGANIZED HEALTH CARE EDUCATION/TRAINING PROGRAM

## 2020-02-18 PROCEDURE — 82553 CREATINE MB FRACTION: CPT

## 2020-02-18 PROCEDURE — 85025 COMPLETE CBC W/AUTO DIFF WBC: CPT

## 2020-02-18 PROCEDURE — 2580000003 HC RX 258: Performed by: INTERNAL MEDICINE

## 2020-02-18 PROCEDURE — 6370000000 HC RX 637 (ALT 250 FOR IP): Performed by: INTERNAL MEDICINE

## 2020-02-18 PROCEDURE — 84443 ASSAY THYROID STIM HORMONE: CPT

## 2020-02-18 PROCEDURE — 96361 HYDRATE IV INFUSION ADD-ON: CPT

## 2020-02-18 PROCEDURE — 2580000003 HC RX 258: Performed by: STUDENT IN AN ORGANIZED HEALTH CARE EDUCATION/TRAINING PROGRAM

## 2020-02-18 PROCEDURE — 96374 THER/PROPH/DIAG INJ IV PUSH: CPT

## 2020-02-18 PROCEDURE — 93010 ELECTROCARDIOGRAM REPORT: CPT | Performed by: INTERNAL MEDICINE

## 2020-02-18 PROCEDURE — 36415 COLL VENOUS BLD VENIPUNCTURE: CPT

## 2020-02-18 PROCEDURE — 96375 TX/PRO/DX INJ NEW DRUG ADDON: CPT

## 2020-02-18 PROCEDURE — G0378 HOSPITAL OBSERVATION PER HR: HCPCS

## 2020-02-18 PROCEDURE — 96365 THER/PROPH/DIAG IV INF INIT: CPT

## 2020-02-18 PROCEDURE — 99245 OFF/OP CONSLTJ NEW/EST HI 55: CPT | Performed by: INTERNAL MEDICINE

## 2020-02-18 PROCEDURE — 94640 AIRWAY INHALATION TREATMENT: CPT

## 2020-02-18 PROCEDURE — 6360000002 HC RX W HCPCS: Performed by: STUDENT IN AN ORGANIZED HEALTH CARE EDUCATION/TRAINING PROGRAM

## 2020-02-18 PROCEDURE — 96366 THER/PROPH/DIAG IV INF ADDON: CPT

## 2020-02-18 PROCEDURE — 93306 TTE W/DOPPLER COMPLETE: CPT

## 2020-02-18 PROCEDURE — 83735 ASSAY OF MAGNESIUM: CPT

## 2020-02-18 PROCEDURE — 2580000003 HC RX 258

## 2020-02-18 PROCEDURE — 96376 TX/PRO/DX INJ SAME DRUG ADON: CPT

## 2020-02-18 PROCEDURE — 80053 COMPREHEN METABOLIC PANEL: CPT

## 2020-02-18 PROCEDURE — 87502 INFLUENZA DNA AMP PROBE: CPT

## 2020-02-18 PROCEDURE — APPSS45 APP SPLIT SHARED TIME 31-45 MINUTES: Performed by: NURSE PRACTITIONER

## 2020-02-18 PROCEDURE — 85378 FIBRIN DEGRADE SEMIQUANT: CPT

## 2020-02-18 PROCEDURE — 6360000002 HC RX W HCPCS: Performed by: INTERNAL MEDICINE

## 2020-02-18 PROCEDURE — 93005 ELECTROCARDIOGRAM TRACING: CPT | Performed by: INTERNAL MEDICINE

## 2020-02-18 PROCEDURE — 84484 ASSAY OF TROPONIN QUANT: CPT

## 2020-02-18 PROCEDURE — 99220 PR INITIAL OBSERVATION CARE/DAY 70 MINUTES: CPT | Performed by: INTERNAL MEDICINE

## 2020-02-18 RX ORDER — METOPROLOL TARTRATE 5 MG/5ML
5 INJECTION INTRAVENOUS ONCE
Status: COMPLETED | OUTPATIENT
Start: 2020-02-18 | End: 2020-02-18

## 2020-02-18 RX ORDER — SODIUM CHLORIDE 0.9 % (FLUSH) 0.9 %
10 SYRINGE (ML) INJECTION EVERY 12 HOURS SCHEDULED
Status: DISCONTINUED | OUTPATIENT
Start: 2020-02-18 | End: 2020-02-23 | Stop reason: HOSPADM

## 2020-02-18 RX ORDER — SODIUM CHLORIDE 9 MG/ML
INJECTION, SOLUTION INTRAVENOUS CONTINUOUS
Status: DISCONTINUED | OUTPATIENT
Start: 2020-02-18 | End: 2020-02-20

## 2020-02-18 RX ORDER — ONDANSETRON 2 MG/ML
4 INJECTION INTRAMUSCULAR; INTRAVENOUS EVERY 6 HOURS PRN
Status: DISCONTINUED | OUTPATIENT
Start: 2020-02-18 | End: 2020-02-23 | Stop reason: HOSPADM

## 2020-02-18 RX ORDER — MONTELUKAST SODIUM 10 MG/1
10 TABLET ORAL NIGHTLY
Status: DISCONTINUED | OUTPATIENT
Start: 2020-02-18 | End: 2020-02-23 | Stop reason: HOSPADM

## 2020-02-18 RX ORDER — IPRATROPIUM BROMIDE AND ALBUTEROL SULFATE 2.5; .5 MG/3ML; MG/3ML
1 SOLUTION RESPIRATORY (INHALATION)
Status: DISCONTINUED | OUTPATIENT
Start: 2020-02-18 | End: 2020-02-23 | Stop reason: HOSPADM

## 2020-02-18 RX ORDER — PANTOPRAZOLE SODIUM 40 MG/1
40 TABLET, DELAYED RELEASE ORAL DAILY
Status: DISCONTINUED | OUTPATIENT
Start: 2020-02-18 | End: 2020-02-23 | Stop reason: HOSPADM

## 2020-02-18 RX ORDER — METHYLPREDNISOLONE SODIUM SUCCINATE 40 MG/ML
40 INJECTION, POWDER, LYOPHILIZED, FOR SOLUTION INTRAMUSCULAR; INTRAVENOUS EVERY 12 HOURS
Status: DISCONTINUED | OUTPATIENT
Start: 2020-02-18 | End: 2020-02-23 | Stop reason: HOSPADM

## 2020-02-18 RX ORDER — OSELTAMIVIR PHOSPHATE 75 MG/1
75 CAPSULE ORAL 2 TIMES DAILY
Status: COMPLETED | OUTPATIENT
Start: 2020-02-18 | End: 2020-02-22

## 2020-02-18 RX ORDER — SODIUM CHLORIDE 0.9 % (FLUSH) 0.9 %
SYRINGE (ML) INJECTION
Status: COMPLETED
Start: 2020-02-18 | End: 2020-02-18

## 2020-02-18 RX ORDER — MAGNESIUM SULFATE 1 G/100ML
1 INJECTION INTRAVENOUS ONCE
Status: DISCONTINUED | OUTPATIENT
Start: 2020-02-18 | End: 2020-02-18

## 2020-02-18 RX ORDER — MAGNESIUM SULFATE IN WATER 40 MG/ML
2 INJECTION, SOLUTION INTRAVENOUS ONCE
Status: COMPLETED | OUTPATIENT
Start: 2020-02-18 | End: 2020-02-18

## 2020-02-18 RX ORDER — POTASSIUM CHLORIDE 7.45 MG/ML
10 INJECTION INTRAVENOUS
Status: DISPENSED | OUTPATIENT
Start: 2020-02-18 | End: 2020-02-18

## 2020-02-18 RX ORDER — ACETAMINOPHEN 325 MG/1
650 TABLET ORAL EVERY 4 HOURS PRN
Status: DISCONTINUED | OUTPATIENT
Start: 2020-02-18 | End: 2020-02-23 | Stop reason: HOSPADM

## 2020-02-18 RX ORDER — 0.9 % SODIUM CHLORIDE 0.9 %
1000 INTRAVENOUS SOLUTION INTRAVENOUS ONCE
Status: COMPLETED | OUTPATIENT
Start: 2020-02-18 | End: 2020-02-18

## 2020-02-18 RX ORDER — SODIUM CHLORIDE 0.9 % (FLUSH) 0.9 %
10 SYRINGE (ML) INJECTION PRN
Status: DISCONTINUED | OUTPATIENT
Start: 2020-02-18 | End: 2020-02-23 | Stop reason: HOSPADM

## 2020-02-18 RX ADMIN — SODIUM CHLORIDE: 9 INJECTION, SOLUTION INTRAVENOUS at 15:34

## 2020-02-18 RX ADMIN — SODIUM CHLORIDE, PRESERVATIVE FREE 10 ML: 5 INJECTION INTRAVENOUS at 20:39

## 2020-02-18 RX ADMIN — METOPROLOL TARTRATE 5 MG: 5 INJECTION INTRAVENOUS at 02:17

## 2020-02-18 RX ADMIN — ENOXAPARIN SODIUM 40 MG: 40 INJECTION SUBCUTANEOUS at 08:19

## 2020-02-18 RX ADMIN — SODIUM CHLORIDE, PRESERVATIVE FREE 10 ML: 5 INJECTION INTRAVENOUS at 02:21

## 2020-02-18 RX ADMIN — PANTOPRAZOLE SODIUM 40 MG: 40 TABLET, DELAYED RELEASE ORAL at 08:19

## 2020-02-18 RX ADMIN — MAGNESIUM SULFATE HEPTAHYDRATE 2 G: 40 INJECTION, SOLUTION INTRAVENOUS at 04:03

## 2020-02-18 RX ADMIN — METHYLPREDNISOLONE SODIUM SUCCINATE 40 MG: 40 INJECTION, POWDER, LYOPHILIZED, FOR SOLUTION INTRAMUSCULAR; INTRAVENOUS at 16:39

## 2020-02-18 RX ADMIN — SODIUM CHLORIDE 1000 ML: 9 INJECTION, SOLUTION INTRAVENOUS at 00:51

## 2020-02-18 RX ADMIN — POTASSIUM CHLORIDE 10 MEQ: 10 INJECTION, SOLUTION INTRAVENOUS at 08:19

## 2020-02-18 RX ADMIN — OSELTAMIVIR PHOSPHATE 75 MG: 75 CAPSULE ORAL at 08:19

## 2020-02-18 RX ADMIN — POTASSIUM CHLORIDE 10 MEQ: 10 INJECTION, SOLUTION INTRAVENOUS at 07:11

## 2020-02-18 RX ADMIN — IPRATROPIUM BROMIDE AND ALBUTEROL SULFATE 1 AMPULE: .5; 3 SOLUTION RESPIRATORY (INHALATION) at 20:07

## 2020-02-18 RX ADMIN — SODIUM CHLORIDE: 9 INJECTION, SOLUTION INTRAVENOUS at 04:03

## 2020-02-18 RX ADMIN — METOPROLOL TARTRATE 25 MG: 25 TABLET, FILM COATED ORAL at 20:38

## 2020-02-18 RX ADMIN — METOPROLOL TARTRATE 25 MG: 25 TABLET, FILM COATED ORAL at 12:07

## 2020-02-18 RX ADMIN — ACETAMINOPHEN 650 MG: 325 TABLET ORAL at 12:41

## 2020-02-18 RX ADMIN — OSELTAMIVIR PHOSPHATE 75 MG: 75 CAPSULE ORAL at 20:38

## 2020-02-18 RX ADMIN — MONTELUKAST SODIUM 10 MG: 10 TABLET, FILM COATED ORAL at 20:38

## 2020-02-18 RX ADMIN — POTASSIUM CHLORIDE 10 MEQ: 10 INJECTION, SOLUTION INTRAVENOUS at 04:54

## 2020-02-18 RX ADMIN — IPRATROPIUM BROMIDE AND ALBUTEROL SULFATE 1 AMPULE: .5; 3 SOLUTION RESPIRATORY (INHALATION) at 07:35

## 2020-02-18 RX ADMIN — ACETAMINOPHEN 650 MG: 325 TABLET ORAL at 23:22

## 2020-02-18 RX ADMIN — AMIODARONE HYDROCHLORIDE 150 MG: 50 INJECTION, SOLUTION INTRAVENOUS at 00:02

## 2020-02-18 RX ADMIN — POTASSIUM CHLORIDE 10 MEQ: 10 INJECTION, SOLUTION INTRAVENOUS at 06:05

## 2020-02-18 RX ADMIN — IPRATROPIUM BROMIDE AND ALBUTEROL SULFATE 1 AMPULE: .5; 3 SOLUTION RESPIRATORY (INHALATION) at 12:44

## 2020-02-18 RX ADMIN — POTASSIUM CHLORIDE 10 MEQ: 10 INJECTION, SOLUTION INTRAVENOUS at 12:08

## 2020-02-18 RX ADMIN — POTASSIUM CHLORIDE 10 MEQ: 10 INJECTION, SOLUTION INTRAVENOUS at 09:27

## 2020-02-18 RX ADMIN — IPRATROPIUM BROMIDE AND ALBUTEROL SULFATE 1 AMPULE: .5; 3 SOLUTION RESPIRATORY (INHALATION) at 17:12

## 2020-02-18 RX ADMIN — ACETAMINOPHEN 650 MG: 325 TABLET ORAL at 08:26

## 2020-02-18 RX ADMIN — POTASSIUM CHLORIDE 10 MEQ: 10 INJECTION, SOLUTION INTRAVENOUS at 10:56

## 2020-02-18 RX ADMIN — METHYLPREDNISOLONE SODIUM SUCCINATE 40 MG: 40 INJECTION, POWDER, LYOPHILIZED, FOR SOLUTION INTRAMUSCULAR; INTRAVENOUS at 04:49

## 2020-02-18 ASSESSMENT — PAIN DESCRIPTION - FREQUENCY: FREQUENCY: CONTINUOUS

## 2020-02-18 ASSESSMENT — ENCOUNTER SYMPTOMS
SORE THROAT: 0
VOMITING: 1
ANAL BLEEDING: 0
SHORTNESS OF BREATH: 0
BLOOD IN STOOL: 0
CHEST TIGHTNESS: 0
NAUSEA: 1
DIARRHEA: 0
PHOTOPHOBIA: 0
ABDOMINAL PAIN: 0
CONSTIPATION: 0
COUGH: 1
RHINORRHEA: 0
WHEEZING: 0

## 2020-02-18 ASSESSMENT — PAIN DESCRIPTION - ORIENTATION: ORIENTATION: MID

## 2020-02-18 ASSESSMENT — PAIN - FUNCTIONAL ASSESSMENT
PAIN_FUNCTIONAL_ASSESSMENT: ACTIVITIES ARE NOT PREVENTED
PAIN_FUNCTIONAL_ASSESSMENT: PREVENTS OR INTERFERES SOME ACTIVE ACTIVITIES AND ADLS

## 2020-02-18 ASSESSMENT — PAIN DESCRIPTION - PAIN TYPE
TYPE: ACUTE PAIN

## 2020-02-18 ASSESSMENT — PAIN SCALES - GENERAL
PAINLEVEL_OUTOF10: 7
PAINLEVEL_OUTOF10: 6
PAINLEVEL_OUTOF10: 6
PAINLEVEL_OUTOF10: 2

## 2020-02-18 ASSESSMENT — PAIN DESCRIPTION - PROGRESSION: CLINICAL_PROGRESSION: NOT CHANGED

## 2020-02-18 ASSESSMENT — PAIN DESCRIPTION - LOCATION
LOCATION: CHEST
LOCATION: GENERALIZED
LOCATION: HEAD
LOCATION: HEAD

## 2020-02-18 ASSESSMENT — PAIN DESCRIPTION - ONSET: ONSET: SUDDEN

## 2020-02-18 ASSESSMENT — PAIN DESCRIPTION - DESCRIPTORS
DESCRIPTORS: HEADACHE
DESCRIPTORS: HEAVINESS;TIGHTNESS
DESCRIPTORS: ACHING;DISCOMFORT

## 2020-02-18 NOTE — PROGRESS NOTES
Attention Dr Clara Reynolds: This patient is currently ordered Zofran and has a QTc of 540 ms as of 2- at 2143. Since Zofran can cause QTc prolongation, Pharmacy recommends switching the patient from Zofran to Compazine or Tigan until the QTc returns to the acceptable range.   Magui Mcwilliams [facility]  2/18/2020  3:34 AM

## 2020-02-18 NOTE — PLAN OF CARE
Problem: Pain:  Goal: Pain level will decrease  Description  Pain level will decrease  Outcome: Met This Shift  Goal: Control of acute pain  Description  Control of acute pain  Outcome: Met This Shift  Goal: Control of chronic pain  Description  Control of chronic pain  Outcome: Met This Shift     Problem: Pain:  Goal: Pain level will decrease  Description  Pain level will decrease  2/18/2020 1320 by Janes Gaytan RN  Outcome: Met This Shift  2/18/2020 1319 by Janes Gaytan RN  Outcome: Met This Shift  Goal: Control of acute pain  Description  Control of acute pain  2/18/2020 1320 by Janes Gaytan RN  Outcome: Met This Shift  2/18/2020 1319 by Janes Gaytan RN  Outcome: Met This Shift  Goal: Control of chronic pain  Description  Control of chronic pain  2/18/2020 1320 by Janes Gaytan RN  Outcome: Met This Shift  2/18/2020 1319 by Janes Gaytan RN  Outcome: Met This Shift     Problem: Fluid Volume:  Goal: Maintenance of adequate hydration will improve  Description  Maintenance of adequate hydration will improve  Outcome: Met This Shift     Problem: Health Behavior:  Goal: Adoption of positive health habits will improve  Description  Adoption of positive health habits will improve  Outcome: Met This Shift  Goal: Compliance with immunization standards will improve  Description  Compliance with immunization standards will improve  Outcome: Met This Shift     Problem: Physical Regulation:  Goal: Complications related to the disease process, condition or treatment will be avoided or minimized  Description  Complications related to the disease process, condition or treatment will be avoided or minimized  Outcome: Met This Shift  Goal: Ability to maintain clinical measurements within normal limits will improve  Description  Ability to maintain clinical measurements within normal limits will improve  Outcome: Met This Shift     Problem: Respiratory:  Goal: Respiratory status will improve  Description  Respiratory status will improve  Outcome: Met This Shift     Problem:  Activity:  Goal: Energy level will increase  Description  Energy level will increase  Outcome: Not Met This Shift  Goal: Ability to return to normal activity level will improve  Description  Ability to return to normal activity level will improve  Outcome: Not Met This Shift     Problem: Physical Regulation:  Goal: Signs and symptoms of infection will decrease  Description  Signs and symptoms of infection will decrease  Outcome: Not Met This Shift     Problem: Sensory:  Goal: General experience of comfort will improve  Description  General experience of comfort will improve  Outcome: Not Met This Shift

## 2020-02-18 NOTE — H&P
x 3 days 0.5 tab qd x 3 days 2/14/20  Yes Enrike Heath DO   hydroxychloroquine (PLAQUENIL) 200 MG tablet Take 2 tablets by mouth 2 times daily 2/10/20  Yes Enrike Heath DO   pantoprazole (PROTONIX) 40 MG tablet Take 1 tablet by mouth daily 1/27/20  Yes Enrike Heath DO   montelukast (SINGULAIR) 10 MG tablet Take 1 tablet by mouth nightly 1/27/20  Yes Enrike Heath DO   solifenacin (VESICARE) 10 MG tablet Take 1 tablet by mouth daily 1/6/20  Yes Enrike Heath DO   sulfaSALAzine (AZULFIDINE) 500 MG tablet Take 2 tablets by mouth 2 times daily 12/27/19  Yes Enrike Heath DO   diclofenac sodium 1 % GEL Apply 4 g topically 4 times daily 12/4/19  Yes Eliel Morrow, GURJIT   Adalimumab 40 MG/0.4ML PNKT Inject 40 mg Sub Q every other week - citrate free 8/22/19  Yes Yuniel Archibald MD   meloxicam (MOBIC) 15 MG tablet Take 1 tablet by mouth daily 8/14/19  Yes Yuniel Archibald MD   Fluticasone Furoate-Vilanterol (BREO ELLIPTA) 200-25 MCG/INH AEPB Inhale 1 puff into the lungs daily 5/2/19  Yes Hector Cabrales,    albuterol sulfate  (90 Base) MCG/ACT inhaler Inhale 2 puffs into the lungs 4 times daily as needed for Wheezing 3/28/19  Yes Mary Kline,    Cholecalciferol (VITAMIN D) 2000 units CAPS capsule Take by mouth   Yes Historical Provider, MD   cetirizine (ZYRTEC) 10 MG tablet Take 10 mg by mouth daily   Yes Historical Provider, MD   predniSONE (DELTASONE) 5 MG tablet Take 1 tablet by mouth daily 8/14/19   Yuniel Archibald MD       Allergies:    Penicillins    Social History:    reports that she has never smoked. She has never used smokeless tobacco. She reports current alcohol use. She reports that she does not use drugs.     Family History:   No known cardiac history in the family    PHYSICAL EXAM:  Vitals:  BP (!) 148/68   Pulse 129   Temp 98.9 °F (37.2 °C) (Oral)   Resp 18   Ht 5' 10\" (1.778 m)   Wt 268 lb (121.6 kg)   SpO2 93%   BMI 38.45 kg/m²   General Appearance: alert and oriented to person, CREATININE 0.6 02/17/2020    GFRAA >60 02/17/2020    LABGLOM >60 02/17/2020    GLUCOSE 106 02/17/2020    PROT 7.2 02/17/2020    LABALBU 4.1 02/17/2020    CALCIUM 8.8 02/17/2020    BILITOT 0.5 02/17/2020    ALKPHOS 65 02/17/2020    AST 24 02/17/2020    ALT 27 02/17/2020       Radiology: Xr Chest Portable    Result Date: 2/17/2020  Clinical indications: Cough. Shortness of breath. TECHNIQUE: Single frontal projection of the chest (1 view). COMPARISON: December 30, 2019 FINDINGS: The heart is enlarged. Acromioclavicular arthropathy. The heart, lungs, mediastinum and regional skeleton are otherwise unremarkable. No evidence for acute cardiopulmonary process. EKG: Wide-complex tachycardia     ASSESSMENT/ PLAN[de-identified]      Active Problems:    Tachycardia  Resolved Problems:    * No resolved hospital problems. *      1. Influenza B   Symptoms of shortness of breath, cough, nausea/vomiting, fatigue   Patient is dehydrated   Influenza B positive rapid flu   Start patient on Tamiflu   IV hydration  2. Acute asthma exacerbation   History of asthma   Influenza B positive   Bilateral wheezing on examination   Likely exacerbation of her asthma due to influenza   Continue supportive measures   DuoNeb, Solu-Medrol  3. Wide-complex tachycardia   No prior EKG in record   Likely due to hypokalemia and hypomagnesemia in addition to dehydration   Cardiology was consulted in the ED   Will continue hydration and replace electrolytes   Repeat EKG in the morning  4. Hypomagnesemia   Due to low oral intake and vomiting   Replace magnesium   Monitor closely  5. Hypokalemia    due to low oral intake and vomiting   Replace potassium   Monitor closely  6. Dehydration   Has not been able to keep anything in   Continue IV hydration   Monitor    Code Status: Full  DVT prophylaxis: Lovenox      Electronically signed by Ivett Kelley MD on 2/18/2020 at 4:01 AM      NOTE: This report was transcribed using voice recognition software.  Every effort

## 2020-02-18 NOTE — CONSULTS
Provider, MD   cetirizine (ZYRTEC) 10 MG tablet Take 10 mg by mouth daily   Yes Historical Provider, MD   predniSONE (DELTASONE) 5 MG tablet Take 1 tablet by mouth daily 8/14/19   Rosa Maria Hyatt MD       Current Medications:    Current Facility-Administered Medications: potassium chloride 10 mEq/100 mL IVPB (Peripheral Line), 10 mEq, Intravenous, Q1H  0.9 % sodium chloride infusion, , Intravenous, Continuous  oseltamivir (TAMIFLU) capsule 75 mg, 75 mg, Oral, BID  montelukast (SINGULAIR) tablet 10 mg, 10 mg, Oral, Nightly  pantoprazole (PROTONIX) tablet 40 mg, 40 mg, Oral, Daily  sodium chloride flush 0.9 % injection 10 mL, 10 mL, Intravenous, 2 times per day  sodium chloride flush 0.9 % injection 10 mL, 10 mL, Intravenous, PRN  magnesium hydroxide (MILK OF MAGNESIA) 400 MG/5ML suspension 30 mL, 30 mL, Oral, Daily PRN  ondansetron (ZOFRAN) injection 4 mg, 4 mg, Intravenous, Q6H PRN  enoxaparin (LOVENOX) injection 40 mg, 40 mg, Subcutaneous, Daily  acetaminophen (TYLENOL) tablet 650 mg, 650 mg, Oral, Q4H PRN  ipratropium-albuterol (DUONEB) nebulizer solution 1 ampule, 1 ampule, Inhalation, Q4H WA  methylPREDNISolone sodium (SOLU-MEDROL) injection 40 mg, 40 mg, Intravenous, Q12H  perflutren lipid microspheres (DEFINITY) injection 1.65 mg, 1.5 mL, Intravenous, ONCE PRN  sodium chloride 0.9 % infusion, , ,     Allergies:  Penicillins    Social History: Non-smoker social drinker no illicit drugs and is  and works at an [employer] agency      Family History: No premature coronary artery disease  No family history on file. REVIEW OF SYSTEMS:     · Constitutional: DenieS, fevers, chills or night sweats  · Eyes: Denies visual changes or drainage  · ENT: Denies headaches or hearing loss. No mouth sores or sore throat. No epistaxis   · Cardiovascular: Denies chest pain, pressure or palpitations. No lower extremity swelling. · Respiratory: . No hemoptysis   · Gastrointestinal: Denies hematemesis or anorexia.  No supervision. I participated in all aspects of the medical decision making. Please see my additional contributions to the HPI, physical exam, and assessment / medical decision making:  _______________________________________________________________________    Patient presenting with several days of cough, treated outpatient for bronchitis, developed nausea vomiting and decreased p.o. intake over the weekend became dehydrated. Could not keep anything down. Felt her heart racing. Presented to ER, potassium was low at 3.2 and magnesium was low at 1.5. Tested positive for influenza B. She has a left bundle branch block (new since 2016) with a tachycardia, for which she received IV amiodarone bolus in the ER. She has received IV fluids and electrolyte replacement. She feels better. Denies ever having had any chest pain. Does not get shortness of breath or chest pain with routine activity going up and down stairs, she works full-time mostly a desk job. Review of telemetry, she was tachycardic in the 130s to 140s pretty consistently without discernible P waves, then around 5:30 AM heart rate rather abruptly dropped down to 90s and clearly restored to sinus mechanism. Second troponin came back mildly elevated 0.04. Physical Exam:  /62   Pulse 103   Temp 98.7 °F (37.1 °C) (Oral)   Resp 18   Ht 5' 10\" (1.778 m)   Wt 268 lb (121.6 kg)   SpO2 92%   BMI 38.45 kg/m²   General appearance: Awake, alert, no acute respiratory distress  Skin: Intact, no rash  ENMT: Moist mucous membranes  Neck: Supple, no carotid bruits.   Normal jugular venous pressure  Lungs: Scattered wheezing  Cardiac: Regular rhythm with a borderline tachycardic rate, normal S1&S2, no murmurs apparent  Abdomen: Soft, positive bowel sounds, nontender  Extremities: Moves all extremities x 4, no lower extremity edema  Neurologic: No focal motor deficits apparent, normal mood and affect    Telemetry: As above, currently sinus tachycardia. Previously was some sort of supraventricular tachycardia  EKG:   Initial EKG 2/17/2020 at 21: 43  Regular wide-complex tachycardia 134 bpm with left bundle branch block. No clear P waves. 2/18/2020 at 00 33  Similar appearing EKG with a wide-complex tachycardia, regular 137 bpm left bundle branch block. Clear P waves are not discernible. 2/18/20 4095  Sinus rhythm with clear P waves, left bundle branch block with a QRS duration 166 ms. Impression:   1. Supraventricular tachycardia: atrial tachycardia versus other SVT versus atrial flutter. Now back in sinus rhythm. Occurred in the setting of dehydration, hypokalemia and hypomagnesemia  2. Left bundle branch block, QRS duration 166 ms, new since 2016  3. Mild troponin elevation. ?  Type I versus type II non-ST elevation MI in the setting of acute influenza and electrolyte abnormalities  4. Comorbid disease: Rheumatoid arthritis, asthma, probable ASHLIE, history endometrial ablation, nephrolithiasis, ureteral stent placement for hydronephrosis    Recommendations: The abrupt change in heart rate around 530 this morning associated with clear appearance of P waves suggest an abnormal supraventricular arrhythmia. Certainly may have been precipitated by abnormal electrolytes and acute respiratory illness. The new left bundle branch block along with mildly elevated troponin mandates an ischemia evaluation. With history of rheumatoid arthritis she is at risk of premature CAD.  2D echocardiogram   Aggressive electrolyte replacement   Trend troponin   Monitor telemetry for recurrent arrhythmias   Influenza is being treated per primary   Add low-dose beta-blocker to decrease myocardial oxygen demand   If EF low, or troponin elevates significantly, will recommend left heart catheterization   If troponin stays flat, and EF normal, plan for stress test   Aggressive risk factor modification    Thank you for the consultation.   Please do not

## 2020-02-18 NOTE — ED PROVIDER NOTES
Negative for decreased urine volume, difficulty urinating, dyspareunia, hematuria and urgency. Musculoskeletal: Negative for arthralgias and myalgias. Skin: Negative for pallor and rash. Neurological: Negative for dizziness, weakness, light-headedness and headaches. Physical Exam  Vitals signs and nursing note reviewed. Constitutional:       General: She is not in acute distress. Appearance: Normal appearance. She is well-developed. She is obese. She is not ill-appearing. HENT:      Head: Normocephalic and atraumatic. Nose: Nose normal. No rhinorrhea. Mouth/Throat:      Mouth: Mucous membranes are dry. Pharynx: No oropharyngeal exudate or posterior oropharyngeal erythema. Eyes:      General:         Left eye: No discharge. Extraocular Movements: Extraocular movements intact. Conjunctiva/sclera: Conjunctivae normal.      Pupils: Pupils are equal, round, and reactive to light. Neck:      Musculoskeletal: Normal range of motion and neck supple. Cardiovascular:      Rate and Rhythm: Regular rhythm. Tachycardia present. Pulses: Normal pulses. Heart sounds: Normal heart sounds. No murmur. Pulmonary:      Effort: Pulmonary effort is normal. No respiratory distress. Breath sounds: Normal breath sounds. No stridor. No wheezing, rhonchi or rales. Chest:      Chest wall: No tenderness. Abdominal:      General: Bowel sounds are normal. There is no distension. Palpations: Abdomen is soft. Tenderness: There is no abdominal tenderness. There is no guarding or rebound. Lymphadenopathy:      Cervical: No cervical adenopathy. Skin:     General: Skin is warm and dry. Neurological:      General: No focal deficit present. Mental Status: She is alert and oriented to person, place, and time.           Procedures     MDM  Number of Diagnoses or Management Options  Acute upper respiratory infection:   Dehydration:   Tachycardia:   Diagnosis Penicillins    -------------------------------------------------- RESULTS -------------------------------------------------    LABS:  Results for orders placed or performed during the hospital encounter of 02/17/20   CBC Auto Differential   Result Value Ref Range    WBC 7.4 4.5 - 11.5 E9/L    RBC 4.70 3.50 - 5.50 E12/L    Hemoglobin 14.1 11.5 - 15.5 g/dL    Hematocrit 44.6 34.0 - 48.0 %    MCV 94.9 80.0 - 99.9 fL    MCH 30.0 26.0 - 35.0 pg    MCHC 31.6 (L) 32.0 - 34.5 %    RDW 12.8 11.5 - 15.0 fL    Platelets 349 518 - 160 E9/L    MPV 10.7 7.0 - 12.0 fL    Neutrophils % 70.6 43.0 - 80.0 %    Immature Granulocytes % 0.1 0.0 - 5.0 %    Lymphocytes % 19.2 (L) 20.0 - 42.0 %    Monocytes % 9.2 2.0 - 12.0 %    Eosinophils % 0.5 0.0 - 6.0 %    Basophils % 0.4 0.0 - 2.0 %    Neutrophils Absolute 5.22 1.80 - 7.30 E9/L    Immature Granulocytes # 0.01 E9/L    Lymphocytes Absolute 1.42 (L) 1.50 - 4.00 E9/L    Monocytes Absolute 0.68 0.10 - 0.95 E9/L    Eosinophils Absolute 0.04 (L) 0.05 - 0.50 E9/L    Basophils Absolute 0.03 0.00 - 0.20 E9/L   Comprehensive Metabolic Panel   Result Value Ref Range    Sodium 139 132 - 146 mmol/L    Potassium 3.2 (L) 3.5 - 5.0 mmol/L    Chloride 98 98 - 107 mmol/L    CO2 26 22 - 29 mmol/L    Anion Gap 15 7 - 16 mmol/L    Glucose 106 (H) 74 - 99 mg/dL    BUN 19 6 - 20 mg/dL    CREATININE 0.6 0.5 - 1.0 mg/dL    GFR Non-African American >60 >=60 mL/min/1.73    GFR African American >60     Calcium 8.8 8.6 - 10.2 mg/dL    Total Protein 7.2 6.4 - 8.3 g/dL    Alb 4.1 3.5 - 5.2 g/dL    Total Bilirubin 0.5 0.0 - 1.2 mg/dL    Alkaline Phosphatase 65 35 - 104 U/L    ALT 27 0 - 32 U/L    AST 24 0 - 31 U/L   Troponin   Result Value Ref Range    Troponin <0.01 0.00 - 0.03 ng/mL   Brain Natriuretic Peptide   Result Value Ref Range    Pro-BNP 1,048 (H) 0 - 125 pg/mL   Lactate, Sepsis   Result Value Ref Range    Lactic Acid, Sepsis 1.3 0.5 - 1.9 mmol/L   EKG 12 Lead   Result Value Ref Range    Ventricular Rate Alexander. Discussed case. They will admit the patient. This patient's ED course included: a personal history and physicial examination, re-evaluation prior to disposition, multiple bedside re-evaluations, IV medications, cardiac monitoring and continuous pulse oximetry    This patient has remained hemodynamically stable during their ED course. Diagnosis:  1. Dehydration    2. Acute upper respiratory infection    3. Tachycardia        Disposition:  Patient's disposition: Admit to telemetry  Patient's condition is stable.          Jill Drummond., DO  Resident  02/18/20 [id number]

## 2020-02-18 NOTE — PROGRESS NOTES
I have personally seen and examined the patient. Patient was admitted with shortness of breath cough nausea and vomiting. EKG showed signs of left bundle branch block. Initial troponin was negative, trended up to 0.04. Patient tested positive for influenza B. Questionable NSTEMI, cardiology was consulted, continue trending troponin. The elevation in troponin might be due to influenza B and tachycardia as demand ischemia, but I need to rule out ACS.   Influenza B start patient on Tamiflu  History of asthma, seen acute exacerbation due to influenza B, start the patient on duo nebs and steroids

## 2020-02-19 LAB
ALBUMIN SERPL-MCNC: 3.6 G/DL (ref 3.5–5.2)
ALBUMIN SERPL-MCNC: 4.1 G/DL (ref 3.5–5.2)
ALP BLD-CCNC: 56 U/L (ref 35–104)
ALP BLD-CCNC: 66 U/L (ref 35–104)
ALT SERPL-CCNC: 72 U/L (ref 0–32)
ALT SERPL-CCNC: 86 U/L (ref 0–32)
ANION GAP SERPL CALCULATED.3IONS-SCNC: 12 MMOL/L (ref 7–16)
ANION GAP SERPL CALCULATED.3IONS-SCNC: 9 MMOL/L (ref 7–16)
AST SERPL-CCNC: 45 U/L (ref 0–31)
AST SERPL-CCNC: 49 U/L (ref 0–31)
ATYPICAL LYMPHOCYTE RELATIVE PERCENT: 3 % (ref 0–4)
BASOPHILS ABSOLUTE: 0 E9/L (ref 0–0.2)
BASOPHILS RELATIVE PERCENT: 0 % (ref 0–2)
BILIRUB SERPL-MCNC: 0.4 MG/DL (ref 0–1.2)
BILIRUB SERPL-MCNC: 0.4 MG/DL (ref 0–1.2)
BUN BLDV-MCNC: 13 MG/DL (ref 6–20)
BUN BLDV-MCNC: 16 MG/DL (ref 6–20)
CALCIUM SERPL-MCNC: 8.2 MG/DL (ref 8.6–10.2)
CALCIUM SERPL-MCNC: 8.8 MG/DL (ref 8.6–10.2)
CHLORIDE BLD-SCNC: 106 MMOL/L (ref 98–107)
CHLORIDE BLD-SCNC: 108 MMOL/L (ref 98–107)
CO2: 24 MMOL/L (ref 22–29)
CO2: 25 MMOL/L (ref 22–29)
CREAT SERPL-MCNC: 0.5 MG/DL (ref 0.5–1)
CREAT SERPL-MCNC: 0.6 MG/DL (ref 0.5–1)
EOSINOPHILS ABSOLUTE: 0 E9/L (ref 0.05–0.5)
EOSINOPHILS RELATIVE PERCENT: 0 % (ref 0–6)
GFR AFRICAN AMERICAN: >60
GFR AFRICAN AMERICAN: >60
GFR NON-AFRICAN AMERICAN: >60 ML/MIN/1.73
GFR NON-AFRICAN AMERICAN: >60 ML/MIN/1.73
GLUCOSE BLD-MCNC: 103 MG/DL (ref 74–99)
GLUCOSE BLD-MCNC: 148 MG/DL (ref 74–99)
HCT VFR BLD CALC: 37.8 % (ref 34–48)
HEMOGLOBIN: 12 G/DL (ref 11.5–15.5)
LYMPHOCYTES ABSOLUTE: 1.76 E9/L (ref 1.5–4)
LYMPHOCYTES RELATIVE PERCENT: 37 % (ref 20–42)
MAGNESIUM: 2 MG/DL (ref 1.6–2.6)
MAGNESIUM: 2.1 MG/DL (ref 1.6–2.6)
MCH RBC QN AUTO: 30.1 PG (ref 26–35)
MCHC RBC AUTO-ENTMCNC: 31.7 % (ref 32–34.5)
MCV RBC AUTO: 94.7 FL (ref 80–99.9)
MONOCYTES ABSOLUTE: 0.31 E9/L (ref 0.1–0.95)
MONOCYTES RELATIVE PERCENT: 7 % (ref 2–12)
NEUTROPHILS ABSOLUTE: 2.33 E9/L (ref 1.8–7.3)
NEUTROPHILS RELATIVE PERCENT: 53 % (ref 43–80)
PDW BLD-RTO: 12.7 FL (ref 11.5–15)
PLATELET # BLD: 174 E9/L (ref 130–450)
PMV BLD AUTO: 10.9 FL (ref 7–12)
POTASSIUM SERPL-SCNC: 3.4 MMOL/L (ref 3.5–5)
POTASSIUM SERPL-SCNC: 4 MMOL/L (ref 3.5–5)
RBC # BLD: 3.99 E12/L (ref 3.5–5.5)
RBC # BLD: NORMAL 10*6/UL
SODIUM BLD-SCNC: 142 MMOL/L (ref 132–146)
SODIUM BLD-SCNC: 142 MMOL/L (ref 132–146)
TOTAL PROTEIN: 6.1 G/DL (ref 6.4–8.3)
TOTAL PROTEIN: 6.8 G/DL (ref 6.4–8.3)
TROPONIN: <0.01 NG/ML (ref 0–0.03)
WBC # BLD: 4.4 E9/L (ref 4.5–11.5)

## 2020-02-19 PROCEDURE — 94640 AIRWAY INHALATION TREATMENT: CPT

## 2020-02-19 PROCEDURE — 83735 ASSAY OF MAGNESIUM: CPT

## 2020-02-19 PROCEDURE — 6370000000 HC RX 637 (ALT 250 FOR IP): Performed by: INTERNAL MEDICINE

## 2020-02-19 PROCEDURE — 6360000002 HC RX W HCPCS: Performed by: INTERNAL MEDICINE

## 2020-02-19 PROCEDURE — 84484 ASSAY OF TROPONIN QUANT: CPT

## 2020-02-19 PROCEDURE — 96361 HYDRATE IV INFUSION ADD-ON: CPT

## 2020-02-19 PROCEDURE — G0378 HOSPITAL OBSERVATION PER HR: HCPCS

## 2020-02-19 PROCEDURE — 99233 SBSQ HOSP IP/OBS HIGH 50: CPT | Performed by: INTERNAL MEDICINE

## 2020-02-19 PROCEDURE — 2580000003 HC RX 258: Performed by: INTERNAL MEDICINE

## 2020-02-19 PROCEDURE — 96372 THER/PROPH/DIAG INJ SC/IM: CPT

## 2020-02-19 PROCEDURE — 36415 COLL VENOUS BLD VENIPUNCTURE: CPT

## 2020-02-19 PROCEDURE — 80053 COMPREHEN METABOLIC PANEL: CPT

## 2020-02-19 PROCEDURE — 93005 ELECTROCARDIOGRAM TRACING: CPT | Performed by: INTERNAL MEDICINE

## 2020-02-19 PROCEDURE — 96376 TX/PRO/DX INJ SAME DRUG ADON: CPT

## 2020-02-19 PROCEDURE — 85025 COMPLETE CBC W/AUTO DIFF WBC: CPT

## 2020-02-19 RX ORDER — POTASSIUM CHLORIDE 20 MEQ/1
40 TABLET, EXTENDED RELEASE ORAL 2 TIMES DAILY
Status: DISCONTINUED | OUTPATIENT
Start: 2020-02-19 | End: 2020-02-20

## 2020-02-19 RX ORDER — METOPROLOL SUCCINATE 50 MG/1
50 TABLET, EXTENDED RELEASE ORAL DAILY
Status: DISCONTINUED | OUTPATIENT
Start: 2020-02-19 | End: 2020-02-20

## 2020-02-19 RX ORDER — LISINOPRIL 5 MG/1
5 TABLET ORAL DAILY
Status: DISCONTINUED | OUTPATIENT
Start: 2020-02-19 | End: 2020-02-20

## 2020-02-19 RX ADMIN — OSELTAMIVIR PHOSPHATE 75 MG: 75 CAPSULE ORAL at 21:16

## 2020-02-19 RX ADMIN — LISINOPRIL 5 MG: 5 TABLET ORAL at 09:25

## 2020-02-19 RX ADMIN — ENOXAPARIN SODIUM 40 MG: 40 INJECTION SUBCUTANEOUS at 09:25

## 2020-02-19 RX ADMIN — METHYLPREDNISOLONE SODIUM SUCCINATE 40 MG: 40 INJECTION, POWDER, LYOPHILIZED, FOR SOLUTION INTRAMUSCULAR; INTRAVENOUS at 04:25

## 2020-02-19 RX ADMIN — IPRATROPIUM BROMIDE AND ALBUTEROL SULFATE 1 AMPULE: .5; 3 SOLUTION RESPIRATORY (INHALATION) at 16:42

## 2020-02-19 RX ADMIN — MONTELUKAST SODIUM 10 MG: 10 TABLET, FILM COATED ORAL at 21:16

## 2020-02-19 RX ADMIN — IPRATROPIUM BROMIDE AND ALBUTEROL SULFATE 1 AMPULE: .5; 3 SOLUTION RESPIRATORY (INHALATION) at 13:30

## 2020-02-19 RX ADMIN — POTASSIUM CHLORIDE 40 MEQ: 20 TABLET, EXTENDED RELEASE ORAL at 21:16

## 2020-02-19 RX ADMIN — METHYLPREDNISOLONE SODIUM SUCCINATE 40 MG: 40 INJECTION, POWDER, LYOPHILIZED, FOR SOLUTION INTRAMUSCULAR; INTRAVENOUS at 17:58

## 2020-02-19 RX ADMIN — SODIUM CHLORIDE, PRESERVATIVE FREE 10 ML: 5 INJECTION INTRAVENOUS at 21:17

## 2020-02-19 RX ADMIN — IPRATROPIUM BROMIDE AND ALBUTEROL SULFATE 1 AMPULE: .5; 3 SOLUTION RESPIRATORY (INHALATION) at 10:11

## 2020-02-19 RX ADMIN — METOPROLOL SUCCINATE 50 MG: 50 TABLET, EXTENDED RELEASE ORAL at 09:25

## 2020-02-19 RX ADMIN — OSELTAMIVIR PHOSPHATE 75 MG: 75 CAPSULE ORAL at 09:18

## 2020-02-19 RX ADMIN — IPRATROPIUM BROMIDE AND ALBUTEROL SULFATE 1 AMPULE: .5; 3 SOLUTION RESPIRATORY (INHALATION) at 21:01

## 2020-02-19 RX ADMIN — PANTOPRAZOLE SODIUM 40 MG: 40 TABLET, DELAYED RELEASE ORAL at 09:18

## 2020-02-19 RX ADMIN — SODIUM CHLORIDE: 9 INJECTION, SOLUTION INTRAVENOUS at 04:27

## 2020-02-19 ASSESSMENT — PAIN SCALES - GENERAL
PAINLEVEL_OUTOF10: 0
PAINLEVEL_OUTOF10: 0
PAINLEVEL_OUTOF10: 3
PAINLEVEL_OUTOF10: 0
PAINLEVEL_OUTOF10: 0
PAINLEVEL_OUTOF10: 6

## 2020-02-19 ASSESSMENT — PAIN DESCRIPTION - DESCRIPTORS: DESCRIPTORS: DULL

## 2020-02-19 ASSESSMENT — PAIN DESCRIPTION - ONSET: ONSET: GRADUAL

## 2020-02-19 ASSESSMENT — PAIN DESCRIPTION - ORIENTATION: ORIENTATION: RIGHT

## 2020-02-19 ASSESSMENT — PAIN - FUNCTIONAL ASSESSMENT: PAIN_FUNCTIONAL_ASSESSMENT: ACTIVITIES ARE NOT PREVENTED

## 2020-02-19 ASSESSMENT — PAIN DESCRIPTION - PAIN TYPE: TYPE: ACUTE PAIN

## 2020-02-19 ASSESSMENT — PAIN DESCRIPTION - PROGRESSION
CLINICAL_PROGRESSION: NOT CHANGED
CLINICAL_PROGRESSION: NOT CHANGED

## 2020-02-19 ASSESSMENT — PAIN DESCRIPTION - LOCATION: LOCATION: ABDOMEN

## 2020-02-19 ASSESSMENT — PAIN DESCRIPTION - FREQUENCY: FREQUENCY: CONTINUOUS

## 2020-02-19 NOTE — PROGRESS NOTES
[facility] Progress Note    Admitting Date and Time: 2/17/2020  9:35 PM  Admit Dx: Tachycardia [R00.0]  Tachycardia [R00.0]    Subjective:  Patient is being followed for Tachycardia [R00.0]  Tachycardia [R00.0]   Pt feels okay, overnight she suddenly had some chest tightness    ROS: denies fever, chills, sob, n/v, HA unless stated above.      metoprolol succinate  50 mg Oral Daily    lisinopril  5 mg Oral Daily    oseltamivir  75 mg Oral BID    montelukast  10 mg Oral Nightly    pantoprazole  40 mg Oral Daily    sodium chloride flush  10 mL Intravenous 2 times per day    enoxaparin  40 mg Subcutaneous Daily    ipratropium-albuterol  1 ampule Inhalation Q4H WA    methylPREDNISolone  40 mg Intravenous Q12H     sodium chloride flush, 10 mL, PRN  magnesium hydroxide, 30 mL, Daily PRN  ondansetron, 4 mg, Q6H PRN  acetaminophen, 650 mg, Q4H PRN  perflutren lipid microspheres, 1.5 mL, ONCE PRN         Objective:    BP (!) 146/87   Pulse 86   Temp 98.3 °F (36.8 °C) (Oral)   Resp 18   Ht 5' 10\" (1.778 m)   Wt 267 lb 12.8 oz (121.5 kg)   SpO2 92%   BMI 38.43 kg/m²     General Appearance: alert and oriented to person, place and time  Skin: warm and dry  Head: normocephalic and atraumatic  Eyes: pupils equal, round, and reactive to light, extraocular eye movements intact, conjunctivae normal  Neck: neck supple and non tender without mass   Pulmonary/Chest: clear to auscultation bilaterally- no wheezes, rales or rhonchi, normal air movement, no respiratory distress  Cardiovascular: normal rate, normal S1 and S2 and no carotid bruits  Abdomen: soft, non-tender, non-distended, normal bowel sounds, no masses or organomegaly  Extremities: no cyanosis, no clubbing and no edema  Neurologic: no cranial nerve deficit and speech normal        Recent Labs     02/17/20  2203 02/18/20  0610 02/19/20  0440    139 142   K 3.2* 3.4* 3.4*   CL 98 104 108*   CO2 26 24 25   BUN 19 17 13   CREATININE 0.6 0.5 0.5   GLUCOSE 106* 96 103*   CALCIUM 8.8 8.1* 8.2*       Recent Labs     02/17/20  2203 02/18/20  0610 02/19/20  0440   WBC 7.4 6.4 4.4*   RBC 4.70 4.30 3.99   HGB 14.1 13.1 12.0   HCT 44.6 40.7 37.8   MCV 94.9 94.7 94.7   MCH 30.0 30.5 30.1   MCHC 31.6* 32.2 31.7*   RDW 12.8 13.1 12.7    195 174   MPV 10.7 10.7 10.9       Assessment:    Active Problems:    Tachycardia  Resolved Problems:    * No resolved hospital problems. *      Plan:  1. Questionable NSTEMI secondary of stress of influenza B/demand ischemia, troponin trended mildly positive at 0.04 then back to negative, EKG showing new left bundle branch block, discussed with cardiology and patient will get cardiac cath tomorrow morning. 2.  Influenza B, start patient on Tamiflu. 3.  History of asthma and acute exacerbation due to influenza B, continue nebs and steroids  4. Hypomagnesemia, replaced. 5.  Hypokalemia we will replace. I have spent more than 30 minutes in evaluating the patient. In case patient gets transferred early in the morning and no attending was able to see her this would serve as a discharge summary. NOTE: This report was transcribed using voice recognition software. Every effort was made to ensure accuracy; however, inadvertent computerized transcription errors may be present.   Electronically signed by Dorinda Cobb MD on 2/19/2020 at 11:10 AM

## 2020-02-19 NOTE — PROGRESS NOTES
(MILK OF MAGNESIA) 400 MG/5ML suspension 30 mL, 30 mL, Oral, Daily PRN, Ivett Kelley MD    ondansetron [facility]) injection 4 mg, 4 mg, Intravenous, Q6H PRN, Ivett Kelley MD    enoxaparin (LOVENOX) injection 40 mg, 40 mg, Subcutaneous, Daily, Ivett Kelley MD, 40 mg at 02/18/20 0819    acetaminophen (TYLENOL) tablet 650 mg, 650 mg, Oral, Q4H PRN, Ivett Kelley MD, 650 mg at 02/18/20 2322    ipratropium-albuterol (DUONEB) nebulizer solution 1 ampule, 1 ampule, Inhalation, Q4H WA, Ivett Kelley MD, 1 ampule at 02/18/20 2007    methylPREDNISolone sodium (SOLU-MEDROL) injection 40 mg, 40 mg, Intravenous, Q12H, Ivett Kelley MD, 40 mg at 02/19/20 0425    perflutren lipid microspheres (DEFINITY) injection 1.65 mg, 1.5 mL, Intravenous, ONCE PRN, Tanya Porter MD    Physical Exam:  /67   Pulse 97   Temp 99 °F (37.2 °C) (Oral)   Resp 20   Ht 5' 10\" (1.778 m)   Wt 267 lb 12.8 oz (121.5 kg)   SpO2 94%   BMI 38.43 kg/m²   Wt Readings from Last 3 Encounters:   02/19/20 267 lb 12.8 oz (121.5 kg)   02/14/20 278 lb (126.1 kg)   12/30/19 274 lb 3.2 oz (124.4 kg)     Appearance: Awake, alert, no acute respiratory distress  Skin: Intact, no rash  Head: Normocephalic, atraumatic  Eyes: EOMI, no conjunctival erythema  ENMT: No pharyngeal erythema, MMM, no rhinorrhea  Neck: Supple, no elevated JVP, no carotid bruits  Lungs: Clear to auscultation bilaterally. No wheezes, rales, or rhonchi.   Cardiac: PMI nondisplaced, Regular rhythm with a normal rate, S1 & S2 normal, no murmurs  Abdomen: Soft, nontender, +bowel sounds  Extremities: Moves all extremities x 4, no lower extremity edema  Neurologic: No focal motor deficits apparent, normal mood and affect  Peripheral Pulses: Intact posterior tibial pulses bilaterally    Intake/Output:    Intake/Output Summary (Last 24 hours) at 2/19/2020 0646  Last data filed at 2/18/2020 1738  Gross per 24 hour   Intake 640 ml   Output --   Net 640 ml     No intake/output data 80s    Chest X-ray:     The heart is enlarged. Acromioclavicular arthropathy. The heart, lungs, mediastinum and regional skeleton are otherwise  unremarkable.      Impression:         No evidence for acute cardiopulmonary process. Echocardiogram:    Summary   Left ventricle is severely dilated. LVEDD 6.9 cm.   LV systolic function is mildly reduced.   Ejection fraction is visually estimated at 40-45%.   Grade II diastolic dysfunction.   No regional wall motion abnormalities seen.   Normal left ventricular wall thickness.   Abnormal LV septal motion consistent with conduction disorder.   Normal right ventricular size and function.   The left atrium is moderately-severely dilated.   Mildly enlarged right atrium.   Mild thickening of the mitral valve leaflets.   Mild-moderate mitral regurgitation. 2D echocardiogram January 12, 2016 EF 65% with no mitral valve prolapse    Stress test: Exercise treadmill test 2006: 89% MPHR 10.4 METS, 1.2 mm ST depression resolved less than 1 minute, consider low probability for ischemia study. Cardiac catheterization: None    ----------------------------------------------------------------------------------------------------------------------------------------------------------------  IMPRESSION:  1. New onset cardiomyopathy. EF 40 to 45%. Differential ischemic versus nonischemic (viral, hereditary [mother had a cardiomyopathy], other)  2. Supraventricular tachycardia: atrial tachycardia versus other SVT versus atrial flutter. Now back in sinus rhythm. Occurred in setting of electrolyte abnormalities/viral respiratory infection  3. Left bundle branch block, QRS duration 166 ms, new since 2016  4. Mild troponin elevation. ?  Type I versus type II non-ST elevation MI in the setting of acute influenza and electrolyte abnormalities. <0.01 -> 0.04 -> 0.01  5. Acute influenza B  6. Hypomagnesemia/hypokalemia  7.  Comorbid disease: Rheumatoid arthritis, asthma, probable ASHLIE, history endometrial ablation, nephrolithiasis, ureteral stent placement for hydronephrosis    RECOMMENDATIONS:  No recurrent arrhythmias. No evidence of decompensated heart failure. However now with new dilated cardiomyopathy and associated left bundle branch block, episode of chest pressure last evening concerning for possible angina, with mild troponin elevation. With rheumatoid arthritis she is at risk of premature CAD.  Initiate GDMT: Metoprolol succinate 50 mg daily, lisinopril 5 mg daily   Left heart catheterization tomorrow to evaluate for ischemic etiology   Decrease IV fluid rate, and discontinue when taking p.o. to avoid precipitating decompensated heart failure   Aggressive risk factor modification, including weight loss, screening for sleep apnea recommended    Discussed risk and benefits of left heart catheterization including risk of MI, stroke, death, bleeding, requiring emergency surgery. Possible outcomes including need for medical management, PCI, bypass surgery complaints the patient. She understands and agrees to proceed. AUC criteria:  HF indication: [address] [facility], [address] Cardiology    NOTE: This report was transcribed using voice recognition software. Every effort was made to ensure accuracy; however, inadvertent computerized transcription errors may be present.

## 2020-02-20 ENCOUNTER — HOSPITAL ENCOUNTER (OUTPATIENT)
Dept: CARDIAC CATH/INVASIVE PROCEDURES | Age: 55
Discharge: HOME | End: 2020-02-20
Payer: COMMERCIAL

## 2020-02-20 VITALS — BODY MASS INDEX: 38.22 KG/M2 | HEIGHT: 70 IN | WEIGHT: 267 LBS

## 2020-02-20 PROBLEM — I42.9 CARDIOMYOPATHY (HCC): Status: ACTIVE | Noted: 2020-02-20

## 2020-02-20 LAB
ABO/RH: NORMAL
ALBUMIN SERPL-MCNC: 3.4 G/DL (ref 3.5–5.2)
ALP BLD-CCNC: 46 U/L (ref 35–104)
ALT SERPL-CCNC: 68 U/L (ref 0–32)
ANION GAP SERPL CALCULATED.3IONS-SCNC: 9 MMOL/L (ref 7–16)
ANTIBODY SCREEN: NORMAL
AST SERPL-CCNC: 43 U/L (ref 0–31)
BASOPHILS ABSOLUTE: 0.01 E9/L (ref 0–0.2)
BASOPHILS RELATIVE PERCENT: 0.1 % (ref 0–2)
BILIRUB SERPL-MCNC: 0.4 MG/DL (ref 0–1.2)
BUN BLDV-MCNC: 14 MG/DL (ref 6–20)
CALCIUM SERPL-MCNC: 8.5 MG/DL (ref 8.6–10.2)
CHLORIDE BLD-SCNC: 106 MMOL/L (ref 98–107)
CO2: 26 MMOL/L (ref 22–29)
CREAT SERPL-MCNC: 0.5 MG/DL (ref 0.5–1)
EKG ATRIAL RATE: 77 BPM
EKG ATRIAL RATE: 82 BPM
EKG ATRIAL RATE: 89 BPM
EKG P AXIS: 67 DEGREES
EKG P AXIS: 72 DEGREES
EKG P AXIS: 74 DEGREES
EKG P-R INTERVAL: 208 MS
EKG P-R INTERVAL: 214 MS
EKG P-R INTERVAL: 220 MS
EKG Q-T INTERVAL: 382 MS
EKG Q-T INTERVAL: 400 MS
EKG Q-T INTERVAL: 420 MS
EKG QRS DURATION: 120 MS
EKG QRS DURATION: 120 MS
EKG QRS DURATION: 162 MS
EKG QTC CALCULATION (BAZETT): 432 MS
EKG QTC CALCULATION (BAZETT): 467 MS
EKG QTC CALCULATION (BAZETT): 511 MS
EKG R AXIS: 20 DEGREES
EKG R AXIS: 22 DEGREES
EKG R AXIS: 28 DEGREES
EKG T AXIS: 73 DEGREES
EKG T AXIS: 84 DEGREES
EKG T AXIS: 93 DEGREES
EKG VENTRICULAR RATE: 77 BPM
EKG VENTRICULAR RATE: 82 BPM
EKG VENTRICULAR RATE: 89 BPM
EOSINOPHILS ABSOLUTE: 0.01 E9/L (ref 0.05–0.5)
EOSINOPHILS RELATIVE PERCENT: 0.1 % (ref 0–6)
GFR AFRICAN AMERICAN: >60
GFR NON-AFRICAN AMERICAN: >60 ML/MIN/1.73
GLUCOSE BLD-MCNC: 93 MG/DL (ref 74–99)
HCT VFR BLD CALC: 36.6 % (ref 34–48)
HEMOGLOBIN: 11.6 G/DL (ref 11.5–15.5)
IMMATURE GRANULOCYTES #: 0.04 E9/L
IMMATURE GRANULOCYTES %: 0.6 % (ref 0–5)
LYMPHOCYTES ABSOLUTE: 1.65 E9/L (ref 1.5–4)
LYMPHOCYTES RELATIVE PERCENT: 23.2 % (ref 20–42)
MAGNESIUM: 2 MG/DL (ref 1.6–2.6)
MCH RBC QN AUTO: 29.8 PG (ref 26–35)
MCHC RBC AUTO-ENTMCNC: 31.7 % (ref 32–34.5)
MCV RBC AUTO: 94.1 FL (ref 80–99.9)
MONOCYTES ABSOLUTE: 0.65 E9/L (ref 0.1–0.95)
MONOCYTES RELATIVE PERCENT: 9.1 % (ref 2–12)
NEUTROPHILS ABSOLUTE: 4.76 E9/L (ref 1.8–7.3)
NEUTROPHILS RELATIVE PERCENT: 66.9 % (ref 43–80)
PDW BLD-RTO: 12.9 FL (ref 11.5–15)
PLATELET # BLD: 187 E9/L (ref 130–450)
PMV BLD AUTO: 11.2 FL (ref 7–12)
POTASSIUM SERPL-SCNC: 5.1 MMOL/L (ref 3.5–5)
RBC # BLD: 3.89 E12/L (ref 3.5–5.5)
SODIUM BLD-SCNC: 141 MMOL/L (ref 132–146)
TOTAL PROTEIN: 5.7 G/DL (ref 6.4–8.3)
WBC # BLD: 7.1 E9/L (ref 4.5–11.5)

## 2020-02-20 PROCEDURE — B2151ZZ FLUOROSCOPY OF LEFT HEART USING LOW OSMOLAR CONTRAST: ICD-10-PCS | Performed by: INTERNAL MEDICINE

## 2020-02-20 PROCEDURE — 86901 BLOOD TYPING SEROLOGIC RH(D): CPT

## 2020-02-20 PROCEDURE — 93010 ELECTROCARDIOGRAM REPORT: CPT | Performed by: INTERNAL MEDICINE

## 2020-02-20 PROCEDURE — B2111ZZ FLUOROSCOPY OF MULTIPLE CORONARY ARTERIES USING LOW OSMOLAR CONTRAST: ICD-10-PCS | Performed by: INTERNAL MEDICINE

## 2020-02-20 PROCEDURE — C1887 CATHETER, GUIDING: HCPCS

## 2020-02-20 PROCEDURE — 96376 TX/PRO/DX INJ SAME DRUG ADON: CPT

## 2020-02-20 PROCEDURE — 6370000000 HC RX 637 (ALT 250 FOR IP): Performed by: INTERNAL MEDICINE

## 2020-02-20 PROCEDURE — 99024 POSTOP FOLLOW-UP VISIT: CPT | Performed by: INTERNAL MEDICINE

## 2020-02-20 PROCEDURE — 36415 COLL VENOUS BLD VENIPUNCTURE: CPT

## 2020-02-20 PROCEDURE — 93005 ELECTROCARDIOGRAM TRACING: CPT | Performed by: INTERNAL MEDICINE

## 2020-02-20 PROCEDURE — 86850 RBC ANTIBODY SCREEN: CPT

## 2020-02-20 PROCEDURE — 93458 L HRT ARTERY/VENTRICLE ANGIO: CPT

## 2020-02-20 PROCEDURE — 83735 ASSAY OF MAGNESIUM: CPT

## 2020-02-20 PROCEDURE — 99233 SBSQ HOSP IP/OBS HIGH 50: CPT | Performed by: INTERNAL MEDICINE

## 2020-02-20 PROCEDURE — 2500000003 HC RX 250 WO HCPCS

## 2020-02-20 PROCEDURE — 6370000000 HC RX 637 (ALT 250 FOR IP)

## 2020-02-20 PROCEDURE — 2709999900 HC NON-CHARGEABLE SUPPLY

## 2020-02-20 PROCEDURE — 80053 COMPREHEN METABOLIC PANEL: CPT

## 2020-02-20 PROCEDURE — G0378 HOSPITAL OBSERVATION PER HR: HCPCS

## 2020-02-20 PROCEDURE — 85025 COMPLETE CBC W/AUTO DIFF WBC: CPT

## 2020-02-20 PROCEDURE — 2580000003 HC RX 258

## 2020-02-20 PROCEDURE — 4A023N7 MEASUREMENT OF CARDIAC SAMPLING AND PRESSURE, LEFT HEART, PERCUTANEOUS APPROACH: ICD-10-PCS | Performed by: INTERNAL MEDICINE

## 2020-02-20 PROCEDURE — 2060000000 HC ICU INTERMEDIATE R&B

## 2020-02-20 PROCEDURE — 6360000002 HC RX W HCPCS

## 2020-02-20 PROCEDURE — 93458 L HRT ARTERY/VENTRICLE ANGIO: CPT | Performed by: INTERNAL MEDICINE

## 2020-02-20 PROCEDURE — 6360000002 HC RX W HCPCS: Performed by: INTERNAL MEDICINE

## 2020-02-20 PROCEDURE — 94640 AIRWAY INHALATION TREATMENT: CPT

## 2020-02-20 PROCEDURE — C1894 INTRO/SHEATH, NON-LASER: HCPCS

## 2020-02-20 PROCEDURE — C1769 GUIDE WIRE: HCPCS

## 2020-02-20 PROCEDURE — 2580000003 HC RX 258: Performed by: INTERNAL MEDICINE

## 2020-02-20 PROCEDURE — 86900 BLOOD TYPING SEROLOGIC ABO: CPT

## 2020-02-20 RX ORDER — ACETAMINOPHEN 325 MG/1
650 TABLET ORAL EVERY 4 HOURS PRN
Status: DISCONTINUED | OUTPATIENT
Start: 2020-02-20 | End: 2020-02-20 | Stop reason: SDUPTHER

## 2020-02-20 RX ORDER — BUDESONIDE 0.5 MG/2ML
500 INHALANT ORAL 2 TIMES DAILY
Status: DISCONTINUED | OUTPATIENT
Start: 2020-02-20 | End: 2020-02-23 | Stop reason: HOSPADM

## 2020-02-20 RX ORDER — FLUTICASONE PROPIONATE 50 MCG
2 SPRAY, SUSPENSION (ML) NASAL DAILY
Status: DISCONTINUED | OUTPATIENT
Start: 2020-02-20 | End: 2020-02-23 | Stop reason: HOSPADM

## 2020-02-20 RX ORDER — ARFORMOTEROL TARTRATE 15 UG/2ML
15 SOLUTION RESPIRATORY (INHALATION) 2 TIMES DAILY
Status: DISCONTINUED | OUTPATIENT
Start: 2020-02-20 | End: 2020-02-23 | Stop reason: HOSPADM

## 2020-02-20 RX ORDER — SODIUM CHLORIDE 0.9 % (FLUSH) 0.9 %
10 SYRINGE (ML) INJECTION EVERY 12 HOURS SCHEDULED
Status: DISCONTINUED | OUTPATIENT
Start: 2020-02-20 | End: 2020-02-20 | Stop reason: SDUPTHER

## 2020-02-20 RX ORDER — LISINOPRIL 10 MG/1
10 TABLET ORAL DAILY
Status: DISCONTINUED | OUTPATIENT
Start: 2020-02-20 | End: 2020-02-22

## 2020-02-20 RX ORDER — METOPROLOL SUCCINATE 100 MG/1
100 TABLET, EXTENDED RELEASE ORAL DAILY
Status: DISCONTINUED | OUTPATIENT
Start: 2020-02-21 | End: 2020-02-23 | Stop reason: HOSPADM

## 2020-02-20 RX ORDER — SODIUM CHLORIDE 0.9 % (FLUSH) 0.9 %
10 SYRINGE (ML) INJECTION PRN
Status: DISCONTINUED | OUTPATIENT
Start: 2020-02-20 | End: 2020-02-20 | Stop reason: SDUPTHER

## 2020-02-20 RX ADMIN — LISINOPRIL 10 MG: 10 TABLET ORAL at 09:45

## 2020-02-20 RX ADMIN — ACETAMINOPHEN 650 MG: 325 TABLET ORAL at 19:50

## 2020-02-20 RX ADMIN — SODIUM CHLORIDE, PRESERVATIVE FREE 10 ML: 5 INJECTION INTRAVENOUS at 19:54

## 2020-02-20 RX ADMIN — FLUTICASONE PROPIONATE 2 SPRAY: 50 SPRAY, METERED NASAL at 19:50

## 2020-02-20 RX ADMIN — OSELTAMIVIR PHOSPHATE 75 MG: 75 CAPSULE ORAL at 19:50

## 2020-02-20 RX ADMIN — MONTELUKAST SODIUM 10 MG: 10 TABLET, FILM COATED ORAL at 19:50

## 2020-02-20 RX ADMIN — BUDESONIDE 500 MCG: 0.5 SUSPENSION RESPIRATORY (INHALATION) at 20:53

## 2020-02-20 RX ADMIN — IPRATROPIUM BROMIDE AND ALBUTEROL SULFATE 1 AMPULE: .5; 3 SOLUTION RESPIRATORY (INHALATION) at 12:01

## 2020-02-20 RX ADMIN — IPRATROPIUM BROMIDE AND ALBUTEROL SULFATE 1 AMPULE: .5; 3 SOLUTION RESPIRATORY (INHALATION) at 08:22

## 2020-02-20 RX ADMIN — WATER 1 ML: 1 INJECTION INTRAMUSCULAR; INTRAVENOUS; SUBCUTANEOUS at 18:40

## 2020-02-20 RX ADMIN — PANTOPRAZOLE SODIUM 40 MG: 40 TABLET, DELAYED RELEASE ORAL at 09:44

## 2020-02-20 RX ADMIN — OSELTAMIVIR PHOSPHATE 75 MG: 75 CAPSULE ORAL at 09:44

## 2020-02-20 RX ADMIN — METOPROLOL SUCCINATE 50 MG: 50 TABLET, EXTENDED RELEASE ORAL at 09:44

## 2020-02-20 RX ADMIN — METHYLPREDNISOLONE SODIUM SUCCINATE 40 MG: 40 INJECTION, POWDER, LYOPHILIZED, FOR SOLUTION INTRAMUSCULAR; INTRAVENOUS at 18:40

## 2020-02-20 RX ADMIN — METHYLPREDNISOLONE SODIUM SUCCINATE 40 MG: 40 INJECTION, POWDER, LYOPHILIZED, FOR SOLUTION INTRAMUSCULAR; INTRAVENOUS at 05:46

## 2020-02-20 RX ADMIN — ARFORMOTEROL TARTRATE 15 MCG: 15 SOLUTION RESPIRATORY (INHALATION) at 20:53

## 2020-02-20 RX ADMIN — IPRATROPIUM BROMIDE AND ALBUTEROL SULFATE 1 AMPULE: .5; 3 SOLUTION RESPIRATORY (INHALATION) at 20:53

## 2020-02-20 ASSESSMENT — PAIN DESCRIPTION - LOCATION: LOCATION: RIB CAGE

## 2020-02-20 ASSESSMENT — PAIN DESCRIPTION - ORIENTATION: ORIENTATION: RIGHT

## 2020-02-20 ASSESSMENT — PAIN SCALES - GENERAL
PAINLEVEL_OUTOF10: 4
PAINLEVEL_OUTOF10: 7
PAINLEVEL_OUTOF10: 2
PAINLEVEL_OUTOF10: 0
PAINLEVEL_OUTOF10: 0

## 2020-02-20 ASSESSMENT — PAIN DESCRIPTION - PAIN TYPE: TYPE: ACUTE PAIN

## 2020-02-20 ASSESSMENT — PAIN DESCRIPTION - DESCRIPTORS: DESCRIPTORS: SHARP

## 2020-02-20 NOTE — PROGRESS NOTES
Patient was having rhythm that staff could not decipher. EKG order obtained. EKG showed SR with 1st degree AVB with premature ventricular complexes and with occasional PVCs and fusion complexes. Cardiology notified.

## 2020-02-20 NOTE — PROGRESS NOTES
Pt returned from cath lab. Ca catheter removed per pt request. Tolerated procedure well. 500 cc of clear yellow urine noted.

## 2020-02-20 NOTE — CARE COORDINATION
2/20/2020 Patient is scheduled for a heart catheterization this afternoon at [facility]. Will follow.

## 2020-02-20 NOTE — PROGRESS NOTES
Called report to Cath lab at [facility] and spoke with Benny Mcgill RN. Since patient is in droplet isolation for influenza, heart cath has been postponed to this afternoon. The cath lab will call later with a time.

## 2020-02-20 NOTE — PROGRESS NOTES
Rheumatoid arthritis, asthma, probable ASHLIE, history endometrial ablation, nephrolithiasis, ureteral stent placement for hydronephrosis    RECOMMENDATIONS:  No recurrent arrhythmias. No evidence of decompensated heart failure. New dilated cardiomyopathy and associated left bundle branch block, episodes of chest pressure concerning for possible angina, with mild troponin elevation. With rheumatoid arthritis she is at risk of premature CAD.  Titrate GDMT: Continue metoprolol succinate 50 mg daily, increase lisinopril to 10 mg daily   Left heart catheterization today to evaluate for ischemic etiology -moved to this afternoon because of droplet isolation   Aggressive risk factor modification, including weight loss, screening for sleep apnea recommended    Yesterday, I discussed risk and benefits of left heart catheterization including risk of MI, stroke, death, bleeding, requiring emergency surgery. Possible outcomes including need for medical management, PCI, bypass surgery complaints the patient. She understands and agrees to proceed. AUC criteria:  HF indication: [address] [facility], [address] [facility] Cardiology    NOTE: This report was transcribed using voice recognition software. Every effort was made to ensure accuracy; however, inadvertent computerized transcription errors may be present.

## 2020-02-20 NOTE — PROCEDURES
[address]                  [address] [id number],  [facility]                            CARDIAC CATHETERIZATION    PATIENT NAME: José Manuel Lopez                   :        1965  MED REC NO:   [id number]                            ROOM:  ACCOUNT NO:   [de-identified]                           ADMIT DATE: 2020  PROVIDER:     Fabi Donnelly MD    DATE OF PROCEDURE:  2020    LEFT HEART CATHETERIZATION    INDICATION:  Cardiomyopathy, elevated troponin, and new left bundle  branch block. PROCEDURE NOTE:  The patient was transferred from [facility] to  undergo left heart catheterization. Under sterile condition and under  local anesthetic and using conscious sedation, a 6-Nauruan Terumo Slender  sheath was inserted into the right radial artery. The patient received  3000 of intravenous heparin. She also received 200 mcg of intraarterial  nitroglycerin and 2.5 mg of diluted verapamil via the right radial  sheath. Then, a 5-Nauruan Tiger diagnostic catheter was advanced to the  ascending aorta without difficulty. The left main coronary artery was  engaged and a coronary angiogram was done. Then, the right coronary  artery was engaged and a coronary angiogram was done. This catheter was  exchanged over a guidewire to a 5-Nauruan pigtail which was advanced into  the left ventricle without difficulty. The left ventricular  end-diastolic pressure was measured. A left ventriculogram was done. There was no significant gradient across the aortic valve. At the end  of the procedure, the patient received 2000 of intravenous heparin. The  pigtail was pulled out. The Terumo Slender sheath was pulled out, and a  Vasc Band was applied over the right radial artery with good hemostasis. The patient tolerated the procedure very well and no complications were  noted.   No significant blood loss occurred during the procedure. FINDINGS:    HEMODYNAMICS:  1. Aortic pressure 148/95 mmHg. 2.  Left ventricular end-diastolic pressure 31 mmHg. CORONARY ANATOMY:    LEFT MAIN:  It is a large artery with no angiographic stenosis noted. LAD:  It is a large artery reaching the apex and giving rise to three  small diagonal branches and several septal perforators. No angiographic  stenosis noted. RAMUS:  It is a large bifurcating branch with no angiographic stenosis  noted. LEFT CIRCUMFLEX:  It is a large artery giving rise to 3 obtuse marginal  branches, an SA dolores or AV dolores branch, then continues to the AV  groove. The first marginal was very small. The second and third  marginal branches were fair in size. No angiographic stenosis noted. RCA:  It is a large dominant artery giving rise to an RV marginal  branch, a large PDA and large PLV branch. No angiographic stenosis  noted. Left ventriculogram revealed mildly dilated left ventricle with an  ejection fraction of 35% to 40%. No mitral regurgitation was noted. IMPRESSION:  1. Absence of coronary angiographic stenosis. 2.  Elevated left ventricular end-diastolic pressure. 3.  Ejection fraction 35% to 40%. RECOMMENDATION:  Aggressive medical therapy. PROCEDURE START TIME:  14:53 p.m. PROCEDURE END TIME:  15:09 p.m. CONTRAST VOLUME:  52 mL of Optiray. FLUOROSCOPY TIME:  2.2 minutes. CONSCIOUS SEDATION:  50 mcg of intravenous fentanyl.         Basilia Campos MD    D: 02/20/2020 15:30:07       T: 02/20/2020 15:59:03     [email or web address]  Job#: [id number]     Doc#: [id number]    CC:

## 2020-02-20 NOTE — PROGRESS NOTES
[facility] Progress Note    Admitting Date and Time: 2/17/2020  9:35 PM  Admit Dx: Tachycardia [R00.0]  Tachycardia [R00.0]  Cardiomyopathy ([address] [I42.9]    Subjective:  Patient is being followed for Tachycardia [R00.0]  Tachycardia [R00.0]  Cardiomyopathy ([address] [I42.9]     She feels little bit better right now, having some problems breathing on the road from [facility] .    ROS: denies fever, chills, sob, n/v, HA unless stated above.      lisinopril  10 mg Oral Daily    metoprolol succinate  50 mg Oral Daily    oseltamivir  75 mg Oral BID    montelukast  10 mg Oral Nightly    pantoprazole  40 mg Oral Daily    sodium chloride flush  10 mL Intravenous 2 times per day    enoxaparin  40 mg Subcutaneous Daily    ipratropium-albuterol  1 ampule Inhalation Q4H WA    methylPREDNISolone  40 mg Intravenous Q12H     sodium chloride flush, 10 mL, PRN  magnesium hydroxide, 30 mL, Daily PRN  ondansetron, 4 mg, Q6H PRN  acetaminophen, 650 mg, Q4H PRN  perflutren lipid microspheres, 1.5 mL, ONCE PRN         Objective:    BP (!) 143/67   Pulse 89   Temp 98.6 °F (37 °C) (Oral)   Resp 18   Ht 5' 10\" (1.778 m)   Wt 267 lb 12.8 oz (121.5 kg)   SpO2 92%   BMI 38.43 kg/m²     General Appearance: alert and oriented to person, place and time  Skin: warm and dry  Head: normocephalic and atraumatic  Eyes: pupils equal, round, and reactive to light, extraocular eye movements intact, conjunctivae normal  Neck: neck supple and non tender without mass   Pulmonary/Chest: clear to auscultation bilaterally- no wheezes, rales or rhonchi, normal air movement, no respiratory distress  Cardiovascular: normal rate, normal S1 and S2 and no carotid bruits  Abdomen: soft, non-tender, non-distended, normal bowel sounds, no masses or organomegaly  Extremities: no cyanosis, no clubbing and no edema  Neurologic: no cranial nerve deficit and speech normal        Recent Labs     02/19/20  0440 02/19/20  2100 02/20/20  0540    142 141   K 3.4* 4.0 5.1*   * 106 106   CO2 25 24 26   BUN 13 16 14   CREATININE 0.5 0.6 0.5   GLUCOSE 103* 148* 93   CALCIUM 8.2* 8.8 8.5*       Recent Labs     02/18/20  0610 02/19/20  0440 02/20/20  0540   WBC 6.4 4.4* 7.1   RBC 4.30 3.99 3.89   HGB 13.1 12.0 11.6   HCT 40.7 37.8 36.6   MCV 94.7 94.7 94.1   MCH 30.5 30.1 29.8   MCHC 32.2 31.7* 31.7*   RDW 13.1 12.7 12.9    174 187   MPV 10.7 10.9 11.2       Assessment:    Active Problems:    Tachycardia    Cardiomyopathy (HCC)  Resolved Problems:    * No resolved hospital problems. *      Plan:    Questionable NSTEMI secondary of stress of influenza B/demand ischemia, troponin trended mildly positive at 0.04 then back to negative, EKG showing new left bundle branch block, discussed with cardiology and patient will get cardiac cath today    New heart failure reduced ejection fraction: Echo revealed EF 40 to 45%, ischemic versus nonischemic cardiomyopathy. Cardiology input is appreciated. Continue metoprolol succinate 50 mg daily and titrate as per cardiology recommendations, lisinopril 10 mg daily    Influenza B, started patient on Tamiflu. History of asthma and acute exacerbation due to influenza B, continue DuoNeb, add Pulmicort and long-acting beta agonist and steroids (change steroids to PO tomorrow)    Hypomagnesemia, replaced. Hypokalemia we will replace. I have spent more than 30 minutes in evaluating the patient. In case patient gets transferred early in the morning and no attending was able to see her this would serve as a discharge summary. NOTE: This report was transcribed using voice recognition software. Every effort was made to ensure accuracy; however, inadvertent computerized transcription errors may be present.   Electronically signed by Chaya Watson MD on 2/20/2020 at 1:36 PM

## 2020-02-20 NOTE — PROGRESS NOTES
Spoke to Bari with mobile intensive. Notified of heart cath being postponed to this afternoon due to patient being in isolation for influenza. AJ notified will call mobile intensive with updated time.

## 2020-02-21 LAB
ALBUMIN SERPL-MCNC: 4 G/DL (ref 3.5–5.2)
ALP BLD-CCNC: 61 U/L (ref 35–104)
ALT SERPL-CCNC: 61 U/L (ref 0–32)
ANION GAP SERPL CALCULATED.3IONS-SCNC: 13 MMOL/L (ref 7–16)
ANION GAP SERPL CALCULATED.3IONS-SCNC: 13 MMOL/L (ref 7–16)
AST SERPL-CCNC: 21 U/L (ref 0–31)
BASOPHILS ABSOLUTE: 0.01 E9/L (ref 0–0.2)
BASOPHILS RELATIVE PERCENT: 0.2 % (ref 0–2)
BILIRUB SERPL-MCNC: 0.5 MG/DL (ref 0–1.2)
BUN BLDV-MCNC: 17 MG/DL (ref 6–20)
BUN BLDV-MCNC: 18 MG/DL (ref 6–20)
CALCIUM SERPL-MCNC: 9 MG/DL (ref 8.6–10.2)
CALCIUM SERPL-MCNC: 9.3 MG/DL (ref 8.6–10.2)
CHLORIDE BLD-SCNC: 100 MMOL/L (ref 98–107)
CHLORIDE BLD-SCNC: 96 MMOL/L (ref 98–107)
CK MB: 2 NG/ML (ref 0–4.3)
CO2: 28 MMOL/L (ref 22–29)
CO2: 29 MMOL/L (ref 22–29)
CREAT SERPL-MCNC: 0.6 MG/DL (ref 0.5–1)
CREAT SERPL-MCNC: 0.7 MG/DL (ref 0.5–1)
EOSINOPHILS ABSOLUTE: 0 E9/L (ref 0.05–0.5)
EOSINOPHILS RELATIVE PERCENT: 0 % (ref 0–6)
GFR AFRICAN AMERICAN: >60
GFR AFRICAN AMERICAN: >60
GFR NON-AFRICAN AMERICAN: >60 ML/MIN/1.73
GFR NON-AFRICAN AMERICAN: >60 ML/MIN/1.73
GLUCOSE BLD-MCNC: 120 MG/DL (ref 74–99)
GLUCOSE BLD-MCNC: 134 MG/DL (ref 74–99)
HCT VFR BLD CALC: 42 % (ref 34–48)
HEMOGLOBIN: 13.3 G/DL (ref 11.5–15.5)
IMMATURE GRANULOCYTES #: 0.07 E9/L
IMMATURE GRANULOCYTES %: 1.1 % (ref 0–5)
LYMPHOCYTES ABSOLUTE: 1.39 E9/L (ref 1.5–4)
LYMPHOCYTES RELATIVE PERCENT: 22.4 % (ref 20–42)
MAGNESIUM: 1.7 MG/DL (ref 1.6–2.6)
MAGNESIUM: 1.9 MG/DL (ref 1.6–2.6)
MCH RBC QN AUTO: 29.7 PG (ref 26–35)
MCHC RBC AUTO-ENTMCNC: 31.7 % (ref 32–34.5)
MCV RBC AUTO: 93.8 FL (ref 80–99.9)
MONOCYTES ABSOLUTE: 0.6 E9/L (ref 0.1–0.95)
MONOCYTES RELATIVE PERCENT: 9.7 % (ref 2–12)
NEUTROPHILS ABSOLUTE: 4.13 E9/L (ref 1.8–7.3)
NEUTROPHILS RELATIVE PERCENT: 66.6 % (ref 43–80)
PDW BLD-RTO: 13 FL (ref 11.5–15)
PLATELET # BLD: 211 E9/L (ref 130–450)
PMV BLD AUTO: 10.8 FL (ref 7–12)
POTASSIUM SERPL-SCNC: 3.7 MMOL/L (ref 3.5–5)
POTASSIUM SERPL-SCNC: 4.1 MMOL/L (ref 3.5–5)
RBC # BLD: 4.48 E12/L (ref 3.5–5.5)
SODIUM BLD-SCNC: 138 MMOL/L (ref 132–146)
SODIUM BLD-SCNC: 141 MMOL/L (ref 132–146)
TOTAL CK: 180 U/L (ref 20–180)
TOTAL PROTEIN: 6.9 G/DL (ref 6.4–8.3)
TROPONIN: <0.01 NG/ML (ref 0–0.03)
WBC # BLD: 6.2 E9/L (ref 4.5–11.5)

## 2020-02-21 PROCEDURE — 93005 ELECTROCARDIOGRAM TRACING: CPT | Performed by: INTERNAL MEDICINE

## 2020-02-21 PROCEDURE — 84484 ASSAY OF TROPONIN QUANT: CPT

## 2020-02-21 PROCEDURE — 85025 COMPLETE CBC W/AUTO DIFF WBC: CPT

## 2020-02-21 PROCEDURE — 36415 COLL VENOUS BLD VENIPUNCTURE: CPT

## 2020-02-21 PROCEDURE — 2580000003 HC RX 258: Performed by: INTERNAL MEDICINE

## 2020-02-21 PROCEDURE — 82553 CREATINE MB FRACTION: CPT

## 2020-02-21 PROCEDURE — 6360000002 HC RX W HCPCS: Performed by: INTERNAL MEDICINE

## 2020-02-21 PROCEDURE — 2580000003 HC RX 258

## 2020-02-21 PROCEDURE — 83735 ASSAY OF MAGNESIUM: CPT

## 2020-02-21 PROCEDURE — 80053 COMPREHEN METABOLIC PANEL: CPT

## 2020-02-21 PROCEDURE — 6370000000 HC RX 637 (ALT 250 FOR IP): Performed by: INTERNAL MEDICINE

## 2020-02-21 PROCEDURE — 82550 ASSAY OF CK (CPK): CPT

## 2020-02-21 PROCEDURE — 94640 AIRWAY INHALATION TREATMENT: CPT

## 2020-02-21 PROCEDURE — 80048 BASIC METABOLIC PNL TOTAL CA: CPT

## 2020-02-21 PROCEDURE — 99233 SBSQ HOSP IP/OBS HIGH 50: CPT | Performed by: INTERNAL MEDICINE

## 2020-02-21 PROCEDURE — 2060000000 HC ICU INTERMEDIATE R&B

## 2020-02-21 RX ORDER — NITROGLYCERIN 0.4 MG/1
0.4 TABLET SUBLINGUAL
Status: ACTIVE | OUTPATIENT
Start: 2020-02-21 | End: 2020-02-21

## 2020-02-21 RX ORDER — POTASSIUM CHLORIDE 20 MEQ/1
20 TABLET, EXTENDED RELEASE ORAL
Status: DISCONTINUED | OUTPATIENT
Start: 2020-02-21 | End: 2020-02-21

## 2020-02-21 RX ORDER — LIDOCAINE 4 G/G
1 PATCH TOPICAL DAILY
Status: DISCONTINUED | OUTPATIENT
Start: 2020-02-21 | End: 2020-02-23 | Stop reason: HOSPADM

## 2020-02-21 RX ORDER — FUROSEMIDE 10 MG/ML
40 INJECTION INTRAMUSCULAR; INTRAVENOUS ONCE
Status: COMPLETED | OUTPATIENT
Start: 2020-02-21 | End: 2020-02-21

## 2020-02-21 RX ORDER — MORPHINE SULFATE 2 MG/ML
2 INJECTION, SOLUTION INTRAMUSCULAR; INTRAVENOUS ONCE
Status: COMPLETED | OUTPATIENT
Start: 2020-02-21 | End: 2020-02-21

## 2020-02-21 RX ORDER — POTASSIUM CHLORIDE 20 MEQ/1
40 TABLET, EXTENDED RELEASE ORAL
Status: DISCONTINUED | OUTPATIENT
Start: 2020-02-21 | End: 2020-02-23 | Stop reason: HOSPADM

## 2020-02-21 RX ORDER — FUROSEMIDE 40 MG/1
40 TABLET ORAL DAILY
Status: DISCONTINUED | OUTPATIENT
Start: 2020-02-23 | End: 2020-02-23 | Stop reason: HOSPADM

## 2020-02-21 RX ORDER — IPRATROPIUM BROMIDE AND ALBUTEROL SULFATE 2.5; .5 MG/3ML; MG/3ML
1 SOLUTION RESPIRATORY (INHALATION) EVERY 4 HOURS PRN
Status: DISCONTINUED | OUTPATIENT
Start: 2020-02-21 | End: 2020-02-23 | Stop reason: HOSPADM

## 2020-02-21 RX ORDER — FUROSEMIDE 20 MG/1
20 TABLET ORAL DAILY
Status: DISCONTINUED | OUTPATIENT
Start: 2020-02-22 | End: 2020-02-21

## 2020-02-21 RX ORDER — FUROSEMIDE 10 MG/ML
40 INJECTION INTRAMUSCULAR; INTRAVENOUS 2 TIMES DAILY
Status: COMPLETED | OUTPATIENT
Start: 2020-02-21 | End: 2020-02-22

## 2020-02-21 RX ADMIN — SODIUM CHLORIDE, PRESERVATIVE FREE 10 ML: 5 INJECTION INTRAVENOUS at 20:52

## 2020-02-21 RX ADMIN — WATER 1 ML: 1 INJECTION INTRAMUSCULAR; INTRAVENOUS; SUBCUTANEOUS at 16:49

## 2020-02-21 RX ADMIN — POTASSIUM CHLORIDE 40 MEQ: 20 TABLET, EXTENDED RELEASE ORAL at 08:04

## 2020-02-21 RX ADMIN — OSELTAMIVIR PHOSPHATE 75 MG: 75 CAPSULE ORAL at 08:08

## 2020-02-21 RX ADMIN — METOPROLOL SUCCINATE 100 MG: 100 TABLET, EXTENDED RELEASE ORAL at 08:04

## 2020-02-21 RX ADMIN — ARFORMOTEROL TARTRATE 15 MCG: 15 SOLUTION RESPIRATORY (INHALATION) at 08:23

## 2020-02-21 RX ADMIN — METHYLPREDNISOLONE SODIUM SUCCINATE 40 MG: 40 INJECTION, POWDER, LYOPHILIZED, FOR SOLUTION INTRAMUSCULAR; INTRAVENOUS at 04:41

## 2020-02-21 RX ADMIN — IPRATROPIUM BROMIDE AND ALBUTEROL SULFATE 1 AMPULE: .5; 3 SOLUTION RESPIRATORY (INHALATION) at 04:23

## 2020-02-21 RX ADMIN — OSELTAMIVIR PHOSPHATE 75 MG: 75 CAPSULE ORAL at 21:51

## 2020-02-21 RX ADMIN — MONTELUKAST SODIUM 10 MG: 10 TABLET, FILM COATED ORAL at 21:50

## 2020-02-21 RX ADMIN — MORPHINE SULFATE 2 MG: 2 INJECTION, SOLUTION INTRAMUSCULAR; INTRAVENOUS at 20:51

## 2020-02-21 RX ADMIN — IPRATROPIUM BROMIDE AND ALBUTEROL SULFATE 1 AMPULE: .5; 3 SOLUTION RESPIRATORY (INHALATION) at 08:22

## 2020-02-21 RX ADMIN — METHYLPREDNISOLONE SODIUM SUCCINATE 40 MG: 40 INJECTION, POWDER, LYOPHILIZED, FOR SOLUTION INTRAMUSCULAR; INTRAVENOUS at 16:50

## 2020-02-21 RX ADMIN — PANTOPRAZOLE SODIUM 40 MG: 40 TABLET, DELAYED RELEASE ORAL at 08:04

## 2020-02-21 RX ADMIN — FUROSEMIDE 40 MG: 10 INJECTION, SOLUTION INTRAMUSCULAR; INTRAVENOUS at 16:49

## 2020-02-21 RX ADMIN — ACETAMINOPHEN 650 MG: 325 TABLET ORAL at 08:08

## 2020-02-21 RX ADMIN — FUROSEMIDE 40 MG: 10 INJECTION, SOLUTION INTRAMUSCULAR; INTRAVENOUS at 08:11

## 2020-02-21 RX ADMIN — FLUTICASONE PROPIONATE 2 SPRAY: 50 SPRAY, METERED NASAL at 08:05

## 2020-02-21 RX ADMIN — SODIUM CHLORIDE, PRESERVATIVE FREE 10 ML: 5 INJECTION INTRAVENOUS at 08:05

## 2020-02-21 RX ADMIN — ENOXAPARIN SODIUM 40 MG: 40 INJECTION SUBCUTANEOUS at 08:08

## 2020-02-21 RX ADMIN — IPRATROPIUM BROMIDE AND ALBUTEROL SULFATE 1 AMPULE: .5; 3 SOLUTION RESPIRATORY (INHALATION) at 17:22

## 2020-02-21 RX ADMIN — ARFORMOTEROL TARTRATE 15 MCG: 15 SOLUTION RESPIRATORY (INHALATION) at 21:36

## 2020-02-21 RX ADMIN — BUDESONIDE 500 MCG: 0.5 SUSPENSION RESPIRATORY (INHALATION) at 21:36

## 2020-02-21 RX ADMIN — IPRATROPIUM BROMIDE AND ALBUTEROL SULFATE 1 AMPULE: .5; 3 SOLUTION RESPIRATORY (INHALATION) at 21:36

## 2020-02-21 RX ADMIN — LISINOPRIL 10 MG: 10 TABLET ORAL at 08:05

## 2020-02-21 RX ADMIN — BUDESONIDE 500 MCG: 0.5 SUSPENSION RESPIRATORY (INHALATION) at 08:23

## 2020-02-21 RX ADMIN — IPRATROPIUM BROMIDE AND ALBUTEROL SULFATE 1 AMPULE: .5; 3 SOLUTION RESPIRATORY (INHALATION) at 12:39

## 2020-02-21 ASSESSMENT — PAIN DESCRIPTION - PROGRESSION
CLINICAL_PROGRESSION: GRADUALLY IMPROVING
CLINICAL_PROGRESSION: GRADUALLY WORSENING

## 2020-02-21 ASSESSMENT — PAIN DESCRIPTION - FREQUENCY
FREQUENCY: INTERMITTENT
FREQUENCY: INTERMITTENT

## 2020-02-21 ASSESSMENT — PAIN - FUNCTIONAL ASSESSMENT: PAIN_FUNCTIONAL_ASSESSMENT: ACTIVITIES ARE NOT PREVENTED

## 2020-02-21 ASSESSMENT — PAIN DESCRIPTION - ORIENTATION
ORIENTATION: LEFT;RIGHT
ORIENTATION: LEFT

## 2020-02-21 ASSESSMENT — PAIN DESCRIPTION - ONSET: ONSET: ON-GOING

## 2020-02-21 ASSESSMENT — PAIN DESCRIPTION - PAIN TYPE
TYPE: ACUTE PAIN

## 2020-02-21 ASSESSMENT — PAIN SCALES - GENERAL
PAINLEVEL_OUTOF10: 5
PAINLEVEL_OUTOF10: 3

## 2020-02-21 ASSESSMENT — PAIN DESCRIPTION - DESCRIPTORS: DESCRIPTORS: HEADACHE;ACHING;SHARP

## 2020-02-21 ASSESSMENT — PAIN DESCRIPTION - LOCATION
LOCATION: ARM
LOCATION: HEAD;ABDOMEN

## 2020-02-21 NOTE — PROGRESS NOTES
Date    TSH 1.770 02/18/2020     No results found for: LABA1C  No results found for: EAG  Lab Results   Component Value Date    CHOL 170 11/09/2019     Lab Results   Component Value Date    TRIG 46 11/09/2019     Lab Results   Component Value Date    HDL 81 11/09/2019     Lab Results   Component Value Date    LDLCALC 80 11/09/2019     Lab Results   Component Value Date    LABVLDL 9 11/09/2019     No results found for: CHOLHDLRATIO  No results for input(s): PROBNP in the last 72 hours. Cardiac Tests:    EKG:   Initial EKG 2/17/2020 at 21: 43  Regular wide-complex tachycardia 134 bpm with left bundle branch block. No clear P waves.     2/18/2020 at 00 33  Similar appearing EKG with a wide-complex tachycardia, regular 137 bpm left bundle branch block. Clear P waves are not discernible.     2/18/20 4758  Sinus rhythm with clear P waves, left bundle branch block with a QRS duration 166 ms. Telemetry: Sinus rhythm 70s to 80s, no tachyarrhythmias    Chest X-ray:     The heart is enlarged. Acromioclavicular arthropathy. The heart, lungs, mediastinum and regional skeleton are otherwise  unremarkable.      Impression:         No evidence for acute cardiopulmonary process. Echocardiogram:    Summary   Left ventricle is severely dilated. LVEDD 6.9 cm.   LV systolic function is mildly reduced.   Ejection fraction is visually estimated at 40-45%.   Grade II diastolic dysfunction.   No regional wall motion abnormalities seen.   Normal left ventricular wall thickness.   Abnormal LV septal motion consistent with conduction disorder.   Normal right ventricular size and function.   The left atrium is moderately-severely dilated.   Mildly enlarged right atrium.   Mild thickening of the mitral valve leaflets.   Mild-moderate mitral regurgitation.     2D echocardiogram January 12, 2016 EF 65% with no mitral valve prolapse    Stress test: Exercise treadmill test 2006: 89% MPHR 10.4 METS, 1.2 mm ST depression resolved less

## 2020-02-21 NOTE — PROGRESS NOTES
[facility] Quality Flow/Interdisciplinary Rounds Progress Note        Quality Flow Rounds held on February 21, 2020    Disciplines Attending:  Bedside Nurse,  and     Carolina Sampson was admitted on 2/17/2020  9:35 PM    Anticipated Discharge Date:  Expected Discharge Date: 02/19/20(estimate)    Disposition:    Kody Score:  Kody Scale Score: 23    Readmission Risk              Risk of Unplanned Readmission:        12           Discussed patient goal for the day, patient clinical progression, and barriers to discharge. The following Goal(s) of the Day/Commitment(s) have been identified:  Start new cardiac medications, stable heart rate and heart rhythm, replace potassium.        Malick Handler  February 21, 2020

## 2020-02-21 NOTE — PROGRESS NOTES
[facility] Progress Note    Admitting Date and Time: 2/17/2020  9:35 PM  Admit Dx: Tachycardia [R00.0]  Tachycardia [R00.0]  Cardiomyopathy ([facility] [address] [I42.9]    Subjective:  Patient is being followed for Tachycardia [R00.0]  Tachycardia [R00.0]  Cardiomyopathy ([address] [I42.9]     Patient was seen and examined this morning. No acute events overnight. She was sitting in chair watching TV. Reported that she feels mildly better than yesterday and she is urinating a lot. ROS: denies fever, chills, sob, n/v, HA unless stated above.      furosemide  40 mg Intravenous BID    [START ON 2/23/2020] furosemide  40 mg Oral Daily    potassium chloride  40 mEq Oral Daily with breakfast    lisinopril  10 mg Oral Daily    metoprolol succinate  100 mg Oral Daily    budesonide  500 mcg Nebulization BID    fluticasone  2 spray Each Nostril Daily    Arformoterol Tartrate  15 mcg Nebulization BID    oseltamivir  75 mg Oral BID    montelukast  10 mg Oral Nightly    pantoprazole  40 mg Oral Daily    sodium chloride flush  10 mL Intravenous 2 times per day    enoxaparin  40 mg Subcutaneous Daily    ipratropium-albuterol  1 ampule Inhalation Q4H WA    methylPREDNISolone  40 mg Intravenous Q12H     ipratropium-albuterol, 1 ampule, Q4H PRN  sodium chloride flush, 10 mL, PRN  magnesium hydroxide, 30 mL, Daily PRN  ondansetron, 4 mg, Q6H PRN  acetaminophen, 650 mg, Q4H PRN  perflutren lipid microspheres, 1.5 mL, ONCE PRN         Objective:    BP (!) 141/91   Pulse 102   Temp 97.8 °F (36.6 °C) (Oral)   Resp 20   Ht 5' 10\" (1.778 m)   Wt 270 lb 8 oz (122.7 kg)   SpO2 96%   BMI 38.81 kg/m²     General Appearance: alert and oriented to person, place and time  Skin: warm and dry  Head: normocephalic and atraumatic  Eyes: pupils equal, round, and reactive to light, extraocular eye movements intact, conjunctivae normal  Neck: neck supple and non tender without mass   Pulmonary/Chest: clear to auscultation

## 2020-02-22 LAB
ALBUMIN SERPL-MCNC: 4.1 G/DL (ref 3.5–5.2)
ALP BLD-CCNC: 59 U/L (ref 35–104)
ALT SERPL-CCNC: 53 U/L (ref 0–32)
ANION GAP SERPL CALCULATED.3IONS-SCNC: 12 MMOL/L (ref 7–16)
AST SERPL-CCNC: 19 U/L (ref 0–31)
BASOPHILS ABSOLUTE: 0.01 E9/L (ref 0–0.2)
BASOPHILS RELATIVE PERCENT: 0.2 % (ref 0–2)
BILIRUB SERPL-MCNC: 0.4 MG/DL (ref 0–1.2)
BUN BLDV-MCNC: 20 MG/DL (ref 6–20)
CALCIUM SERPL-MCNC: 8.9 MG/DL (ref 8.6–10.2)
CHLORIDE BLD-SCNC: 96 MMOL/L (ref 98–107)
CO2: 30 MMOL/L (ref 22–29)
CREAT SERPL-MCNC: 0.6 MG/DL (ref 0.5–1)
EOSINOPHILS ABSOLUTE: 0.01 E9/L (ref 0.05–0.5)
EOSINOPHILS RELATIVE PERCENT: 0.2 % (ref 0–6)
GFR AFRICAN AMERICAN: >60
GFR NON-AFRICAN AMERICAN: >60 ML/MIN/1.73
GLUCOSE BLD-MCNC: 127 MG/DL (ref 74–99)
HCT VFR BLD CALC: 42.9 % (ref 34–48)
HEMOGLOBIN: 13.8 G/DL (ref 11.5–15.5)
IMMATURE GRANULOCYTES #: 0.03 E9/L
IMMATURE GRANULOCYTES %: 0.6 % (ref 0–5)
LYMPHOCYTES ABSOLUTE: 1.22 E9/L (ref 1.5–4)
LYMPHOCYTES RELATIVE PERCENT: 23.1 % (ref 20–42)
MAGNESIUM: 1.8 MG/DL (ref 1.6–2.6)
MCH RBC QN AUTO: 30.1 PG (ref 26–35)
MCHC RBC AUTO-ENTMCNC: 32.2 % (ref 32–34.5)
MCV RBC AUTO: 93.5 FL (ref 80–99.9)
MONOCYTES ABSOLUTE: 0.51 E9/L (ref 0.1–0.95)
MONOCYTES RELATIVE PERCENT: 9.7 % (ref 2–12)
NEUTROPHILS ABSOLUTE: 3.49 E9/L (ref 1.8–7.3)
NEUTROPHILS RELATIVE PERCENT: 66.2 % (ref 43–80)
PDW BLD-RTO: 13.1 FL (ref 11.5–15)
PLATELET # BLD: 226 E9/L (ref 130–450)
PMV BLD AUTO: 10.7 FL (ref 7–12)
POTASSIUM SERPL-SCNC: 3.8 MMOL/L (ref 3.5–5)
RBC # BLD: 4.59 E12/L (ref 3.5–5.5)
SODIUM BLD-SCNC: 138 MMOL/L (ref 132–146)
TOTAL PROTEIN: 7 G/DL (ref 6.4–8.3)
WBC # BLD: 5.3 E9/L (ref 4.5–11.5)

## 2020-02-22 PROCEDURE — 83735 ASSAY OF MAGNESIUM: CPT

## 2020-02-22 PROCEDURE — 6360000002 HC RX W HCPCS: Performed by: INTERNAL MEDICINE

## 2020-02-22 PROCEDURE — 99232 SBSQ HOSP IP/OBS MODERATE 35: CPT | Performed by: INTERNAL MEDICINE

## 2020-02-22 PROCEDURE — 6370000000 HC RX 637 (ALT 250 FOR IP): Performed by: INTERNAL MEDICINE

## 2020-02-22 PROCEDURE — 2580000003 HC RX 258

## 2020-02-22 PROCEDURE — 2060000000 HC ICU INTERMEDIATE R&B

## 2020-02-22 PROCEDURE — 94640 AIRWAY INHALATION TREATMENT: CPT

## 2020-02-22 PROCEDURE — 2580000003 HC RX 258: Performed by: INTERNAL MEDICINE

## 2020-02-22 PROCEDURE — 36415 COLL VENOUS BLD VENIPUNCTURE: CPT

## 2020-02-22 PROCEDURE — 80053 COMPREHEN METABOLIC PANEL: CPT

## 2020-02-22 PROCEDURE — 85025 COMPLETE CBC W/AUTO DIFF WBC: CPT

## 2020-02-22 RX ORDER — LISINOPRIL 20 MG/1
20 TABLET ORAL DAILY
Status: DISCONTINUED | OUTPATIENT
Start: 2020-02-23 | End: 2020-02-23 | Stop reason: HOSPADM

## 2020-02-22 RX ADMIN — OSELTAMIVIR PHOSPHATE 75 MG: 75 CAPSULE ORAL at 09:42

## 2020-02-22 RX ADMIN — ARFORMOTEROL TARTRATE 15 MCG: 15 SOLUTION RESPIRATORY (INHALATION) at 07:53

## 2020-02-22 RX ADMIN — ARFORMOTEROL TARTRATE 15 MCG: 15 SOLUTION RESPIRATORY (INHALATION) at 20:34

## 2020-02-22 RX ADMIN — BUDESONIDE 500 MCG: 0.5 SUSPENSION RESPIRATORY (INHALATION) at 07:54

## 2020-02-22 RX ADMIN — ACETAMINOPHEN 650 MG: 325 TABLET ORAL at 05:41

## 2020-02-22 RX ADMIN — PANTOPRAZOLE SODIUM 40 MG: 40 TABLET, DELAYED RELEASE ORAL at 09:40

## 2020-02-22 RX ADMIN — OSELTAMIVIR PHOSPHATE 75 MG: 75 CAPSULE ORAL at 21:46

## 2020-02-22 RX ADMIN — FUROSEMIDE 40 MG: 10 INJECTION, SOLUTION INTRAMUSCULAR; INTRAVENOUS at 09:39

## 2020-02-22 RX ADMIN — IPRATROPIUM BROMIDE AND ALBUTEROL SULFATE 1 AMPULE: .5; 3 SOLUTION RESPIRATORY (INHALATION) at 12:03

## 2020-02-22 RX ADMIN — FLUTICASONE PROPIONATE 2 SPRAY: 50 SPRAY, METERED NASAL at 09:53

## 2020-02-22 RX ADMIN — MONTELUKAST SODIUM 10 MG: 10 TABLET, FILM COATED ORAL at 21:46

## 2020-02-22 RX ADMIN — IPRATROPIUM BROMIDE AND ALBUTEROL SULFATE 1 AMPULE: .5; 3 SOLUTION RESPIRATORY (INHALATION) at 07:53

## 2020-02-22 RX ADMIN — METHYLPREDNISOLONE SODIUM SUCCINATE 40 MG: 40 INJECTION, POWDER, LYOPHILIZED, FOR SOLUTION INTRAMUSCULAR; INTRAVENOUS at 05:41

## 2020-02-22 RX ADMIN — IPRATROPIUM BROMIDE AND ALBUTEROL SULFATE 1 AMPULE: .5; 3 SOLUTION RESPIRATORY (INHALATION) at 17:03

## 2020-02-22 RX ADMIN — METOPROLOL SUCCINATE 100 MG: 100 TABLET, EXTENDED RELEASE ORAL at 09:40

## 2020-02-22 RX ADMIN — LISINOPRIL 10 MG: 10 TABLET ORAL at 09:40

## 2020-02-22 RX ADMIN — ENOXAPARIN SODIUM 40 MG: 40 INJECTION SUBCUTANEOUS at 09:40

## 2020-02-22 RX ADMIN — Medication 10 ML: at 05:41

## 2020-02-22 RX ADMIN — WATER 10 ML: 1 INJECTION INTRAMUSCULAR; INTRAVENOUS; SUBCUTANEOUS at 05:41

## 2020-02-22 RX ADMIN — IPRATROPIUM BROMIDE AND ALBUTEROL SULFATE 1 AMPULE: .5; 3 SOLUTION RESPIRATORY (INHALATION) at 20:34

## 2020-02-22 RX ADMIN — BUDESONIDE 500 MCG: 0.5 SUSPENSION RESPIRATORY (INHALATION) at 20:34

## 2020-02-22 RX ADMIN — METHYLPREDNISOLONE SODIUM SUCCINATE 40 MG: 40 INJECTION, POWDER, LYOPHILIZED, FOR SOLUTION INTRAMUSCULAR; INTRAVENOUS at 16:35

## 2020-02-22 RX ADMIN — SODIUM CHLORIDE, PRESERVATIVE FREE 10 ML: 5 INJECTION INTRAVENOUS at 09:42

## 2020-02-22 RX ADMIN — POTASSIUM CHLORIDE 40 MEQ: 20 TABLET, EXTENDED RELEASE ORAL at 09:40

## 2020-02-22 ASSESSMENT — PAIN SCALES - GENERAL
PAINLEVEL_OUTOF10: 0
PAINLEVEL_OUTOF10: 4
PAINLEVEL_OUTOF10: 0

## 2020-02-22 NOTE — PROGRESS NOTES
INPATIENT CARDIOLOGY FOLLOW-UP    Name: Pedro Cifuentes    Age: 63 y.o. Date of Admission: 2/17/2020  9:35 PM    Date of Service: 2/22/2020    Primary Cardiologist: Dr. Adam Abraham    Chief Complaint: Follow-up for new left bundle branch block, atrial arrhythmia, nonischemic cardiomyopathy    Interim History:  Left heart catheterization yesterday revealed normal coronary arteries, confirming nonischemic etiology of cardiomyopathy. LVEDP was elevated at 31 mmHg. States she is still short of breath, still coughing and feels heavyness in her chest. .    Echo echo shows LV is dilated, EF is mildly reduced at 40 to 45%, with mild to moderate MR. Sinus rhythm on the monitor. In and out of left bundle branch block. No further tachyarrhythmias.     Review of Systems:   Negative except as described above    Problem List:  Patient Active Problem List   Diagnosis    Arthritis    Asthma    Acute cystitis without hematuria    Viral URI    Bronchitis    Rheumatoid arthritis of multiple sites with negative rheumatoid factor ([facility] [address]    Tachycardia    [facility]       Current Medications:    Current Facility-Administered Medications:     ipratropium-albuterol (DUONEB) nebulizer solution 1 ampule, 1 ampule, Inhalation, Q4H PRN, Ramy Brooks MD, 1 ampule at 02/21/20 0423    [START ON 2/23/2020] furosemide (LASIX) tablet 40 mg, 40 mg, Oral, Daily, Osmar Friend MD    potassium chloride (KLOR-CON M) extended release tablet 40 mEq, 40 mEq, Oral, Daily with breakfast, Osmar Friend MD, 40 mEq at 02/22/20 0940    lidocaine 4 % external patch 1 patch, 1 patch, Transdermal, Daily, Justo Funes MD, 1 patch at 02/22/20 0941    lisinopril (PRINIVIL;ZESTRIL) tablet 10 mg, 10 mg, Oral, Daily, Osmar Friend MD, 10 mg at 02/22/20 0940    metoprolol succinate (TOPROL XL) extended release tablet 100 mg, 100 mg, Oral, Daily, Osmar Friend MD, 100 mg at 02/22/20 0940    budesonide (PULMICORT) nebulizer suspension 500 mcg, 500 mcg, Nebulization, BID, Elda Hickman MD, 500 mcg at 02/22/20 0754    fluticasone (FLONASE) 50 MCG/ACT nasal spray 2 spray, 2 spray, Each Nostril, Daily, Elda Hickman MD, 2 spray at 02/22/20 0953    Arformoterol Tartrate (BROVANA) nebulizer solution 15 mcg, 15 mcg, Nebulization, BID, Elda Hickman MD, 15 mcg at 02/22/20 0753    oseltamivir (TAMIFLU) capsule 75 mg, 75 mg, Oral, BID, Carl Byrd MD, 75 mg at 02/22/20 0942    montelukast (SINGULAIR) tablet 10 mg, 10 mg, Oral, Nightly, Carl Byrd MD, 10 mg at 02/21/20 2150    pantoprazole (PROTONIX) tablet 40 mg, 40 mg, Oral, Daily, Carl Byrd MD, 40 mg at 02/22/20 0940    sodium chloride flush 0.9 % injection 10 mL, 10 mL, Intravenous, 2 times per day, Carl Byrd MD, 10 mL at 02/22/20 0942    sodium chloride flush 0.9 % injection 10 mL, 10 mL, Intravenous, PRN, Carl Byrd MD, 10 mL at 02/22/20 0541    magnesium hydroxide (MILK OF MAGNESIA) 400 MG/5ML suspension 30 mL, 30 mL, Oral, Daily PRN, Carl Byrd MD    ondansetron [facility]) injection 4 mg, 4 mg, Intravenous, Q6H PRN, Carl Byrd MD    enoxaparin (LOVENOX) injection 40 mg, 40 mg, Subcutaneous, Daily, Carl Byrd MD, 40 mg at 02/22/20 0940    acetaminophen (TYLENOL) tablet 650 mg, 650 mg, Oral, Q4H PRN, Carl Byrd MD, 650 mg at 02/22/20 0541    ipratropium-albuterol (DUONEB) nebulizer solution 1 ampule, 1 ampule, Inhalation, Q4H WA, Carl Byrd MD, 1 ampule at 02/22/20 1203    methylPREDNISolone sodium (SOLU-MEDROL) injection 40 mg, 40 mg, Intravenous, Q12H, Carl Byrd MD, 40 mg at 02/22/20 1635    perflutren lipid microspheres (DEFINITY) injection 1.65 mg, 1.5 mL, Intravenous, ONCE PRN, Gwendolyn Albarran MD    Physical Exam:  BP (!) 136/99   Pulse 89   Temp 98 °F (36.7 °C) (Oral)   Resp 18   Ht 5' 10\" (1.778 m)   Wt 270 lb 8 oz (122.7 kg)   SpO2 94%   BMI 38.81 kg/m²   Wt Readings from Last 3 Encounters:   02/21/20 270 lb 8 oz (122.7 kg)   02/20/20 267 lb (121.1 kg)   02/14/20 278 lb (126.1 kg)     Appearance: Awake, alert, no acute respiratory distress  Skin: Intact, no rash  Head: Normocephalic, atraumatic  Eyes: EOMI, no conjunctival erythema  ENMT: No pharyngeal erythema, MMM, no rhinorrhea  Neck: Supple, no elevated JVP, no carotid bruits  Lungs: Bilateral wheezing and rhonchi  Cardiac: PMI nondisplaced, Regular rhythm with a normal rate, S1 & S2 normal, no murmurs  Abdomen: Soft, nontender, +bowel sounds  Extremities: Moves all extremities x 4, no lower extremity edema  Neurologic: No focal motor deficits apparent, normal mood and affect  Peripheral Pulses: Intact posterior tibial pulses bilaterally    Intake/Output:    Intake/Output Summary (Last 24 hours) at 2/22/2020 1651  Last data filed at 2/22/2020 1016  Gross per 24 hour   Intake 180 ml   Output 1200 ml   Net -1020 ml     No intake/output data recorded.     Laboratory Tests:  Recent Labs     02/21/20  0451 02/21/20 2105 02/22/20 0336    138 138   K 3.7 4.1 3.8    96* 96*   CO2 28 29 30*   BUN 17 18 20   CREATININE 0.6 0.7 0.6   GLUCOSE 120* 134* 127*   CALCIUM 9.0 9.3 8.9     Lab Results   Component Value Date    MG 1.8 02/22/2020     Recent Labs     02/20/20  0540 02/21/20 0451 02/22/20  0336   ALKPHOS 46 61 59   ALT 68* 61* 53*   AST 43* 21 19   PROT 5.7* 6.9 7.0   BILITOT 0.4 0.5 0.4   LABALBU 3.4* 4.0 4.1     Recent Labs     02/20/20  0540 02/21/20  0451 02/22/20  0336   WBC 7.1 6.2 5.3   RBC 3.89 4.48 4.59   HGB 11.6 13.3 13.8   HCT 36.6 42.0 42.9   MCV 94.1 93.8 93.5   MCH 29.8 29.7 30.1   MCHC 31.7* 31.7* 32.2   RDW 12.9 13.0 13.1    211 226   MPV 11.2 10.8 10.7     Lab Results   Component Value Date    CKTOTAL 180 02/21/2020    CKMB 2.0 02/21/2020    TROPONINI <0.01 02/21/2020    TROPONINI <0.01 02/19/2020    TROPONINI 0.01 02/18/2020     No results found for: INR, PROTIME  Lab Results   Component Value Date    TSH 1.770 02/18/2020 No results found for: LABA1C  No results found for: EAG  Lab Results   Component Value Date    CHOL 170 11/09/2019     Lab Results   Component Value Date    TRIG 46 11/09/2019     Lab Results   Component Value Date    HDL 81 11/09/2019     Lab Results   Component Value Date    LDLCALC 80 11/09/2019     Lab Results   Component Value Date    LABVLDL 9 11/09/2019     No results found for: CHOLHDLRATIO  No results for input(s): PROBNP in the last 72 hours. Cardiac Tests:    EKG:   Initial EKG 2/17/2020 at 21: 43  Regular wide-complex tachycardia 134 bpm with left bundle branch block. No clear P waves.     2/18/2020 at 00 33  Similar appearing EKG with a wide-complex tachycardia, regular 137 bpm left bundle branch block. Clear P waves are not discernible.     2/18/20 5606  Sinus rhythm with clear P waves, left bundle branch block with a QRS duration 166 ms. Telemetry: Sinus rhythm 70s to 80s, and few episodes of NSVT. Vitals : BP - 136/99    Labs reviewed. Chest X-ray:     The heart is enlarged. Acromioclavicular arthropathy. The heart, lungs, mediastinum and regional skeleton are otherwise  unremarkable.      Impression:         No evidence for acute cardiopulmonary process. Echocardiogram:    Summary   Left ventricle is severely dilated. LVEDD 6.9 cm.   LV systolic function is mildly reduced.   Ejection fraction is visually estimated at 40-45%.   Grade II diastolic dysfunction.   No regional wall motion abnormalities seen.   Normal left ventricular wall thickness.   Abnormal LV septal motion consistent with conduction disorder.   Normal right ventricular size and function.   The left atrium is moderately-severely dilated.   Mildly enlarged right atrium.   Mild thickening of the mitral valve leaflets.   Mild-moderate mitral regurgitation.     2D echocardiogram January 12, 2016 EF 65% with no mitral valve prolapse    Stress test: Exercise treadmill test 2006: 89% MPHR 10.4 METS, 1.2 mm ST depression resolved less than 1 minute, consider low probability for ischemia study. Cardiac catheterization: 2/20/2020  CORONARY ANATOMY:     LEFT MAIN:  It is a large artery with no angiographic stenosis noted.     LAD:  It is a large artery reaching the apex and giving rise to three  small diagonal branches and several septal perforators. No angiographic  stenosis noted.     RAMUS:  It is a large bifurcating branch with no angiographic stenosis  noted.     LEFT CIRCUMFLEX:  It is a large artery giving rise to 3 obtuse marginal  branches, an SA dolores or AV dolores branch, then continues to the AV  groove. The first marginal was very small. The second and third  marginal branches were fair in size. No angiographic stenosis noted.     RCA:  It is a large dominant artery giving rise to an RV marginal  branch, a large PDA and large PLV branch. No angiographic stenosis  noted.     Left ventriculogram revealed mildly dilated left ventricle with an  ejection fraction of 35% to 40%. No mitral regurgitation was noted.     Conclusion[de-identified]  1.  Absence of coronary angiographic stenosis. 2.  Elevated left ventricular end-diastolic pressure. 3.  Ejection fraction 35% to 40%.    ----------------------------------------------------------------------------------------------------------------------------------------------------------------  IMPRESSION:  1. Nonischemic cardiomyopathy. EF 40 to 45%. Suspect viral myocarditis (influenza, other viral) versus hereditary (mother had a cardiomyopathy). 2. Acute systolic heart failure. LVEDP 31 on cath, still short of breath (multifactorial), now she appears euvolemic and still significant bronchospasm  3. Supraventricular tachycardia on presentation: atrial tachycardia versus other SVT versus atrial flutter. Now back in sinus rhythm. Occurred in setting of electrolyte abnormalities/viral respiratory infection  4.  Intermittent left bundle branch block, QRS duration 166 ms,

## 2020-02-23 VITALS
HEIGHT: 70 IN | OXYGEN SATURATION: 89 % | RESPIRATION RATE: 14 BRPM | WEIGHT: 270.5 LBS | HEART RATE: 81 BPM | BODY MASS INDEX: 38.73 KG/M2 | SYSTOLIC BLOOD PRESSURE: 123 MMHG | TEMPERATURE: 98.2 F | DIASTOLIC BLOOD PRESSURE: 58 MMHG

## 2020-02-23 LAB
ALBUMIN SERPL-MCNC: 4 G/DL (ref 3.5–5.2)
ALP BLD-CCNC: 60 U/L (ref 35–104)
ALT SERPL-CCNC: 54 U/L (ref 0–32)
ANION GAP SERPL CALCULATED.3IONS-SCNC: 12 MMOL/L (ref 7–16)
AST SERPL-CCNC: 16 U/L (ref 0–31)
BASOPHILS ABSOLUTE: 0.01 E9/L (ref 0–0.2)
BASOPHILS RELATIVE PERCENT: 0.1 % (ref 0–2)
BILIRUB SERPL-MCNC: 0.4 MG/DL (ref 0–1.2)
BLOOD CULTURE, ROUTINE: NORMAL
BUN BLDV-MCNC: 22 MG/DL (ref 6–20)
CALCIUM SERPL-MCNC: 9.4 MG/DL (ref 8.6–10.2)
CHLORIDE BLD-SCNC: 97 MMOL/L (ref 98–107)
CO2: 28 MMOL/L (ref 22–29)
CREAT SERPL-MCNC: 0.6 MG/DL (ref 0.5–1)
CULTURE, BLOOD 2: NORMAL
EOSINOPHILS ABSOLUTE: 0 E9/L (ref 0.05–0.5)
EOSINOPHILS RELATIVE PERCENT: 0 % (ref 0–6)
GFR AFRICAN AMERICAN: >60
GFR NON-AFRICAN AMERICAN: >60 ML/MIN/1.73
GLUCOSE BLD-MCNC: 136 MG/DL (ref 74–99)
HCT VFR BLD CALC: 43.7 % (ref 34–48)
HEMOGLOBIN: 13.9 G/DL (ref 11.5–15.5)
IMMATURE GRANULOCYTES #: 0.04 E9/L
IMMATURE GRANULOCYTES %: 0.6 % (ref 0–5)
LYMPHOCYTES ABSOLUTE: 1.08 E9/L (ref 1.5–4)
LYMPHOCYTES RELATIVE PERCENT: 16 % (ref 20–42)
MAGNESIUM: 2.1 MG/DL (ref 1.6–2.6)
MCH RBC QN AUTO: 29.6 PG (ref 26–35)
MCHC RBC AUTO-ENTMCNC: 31.8 % (ref 32–34.5)
MCV RBC AUTO: 93 FL (ref 80–99.9)
MONOCYTES ABSOLUTE: 0.62 E9/L (ref 0.1–0.95)
MONOCYTES RELATIVE PERCENT: 9.2 % (ref 2–12)
NEUTROPHILS ABSOLUTE: 4.99 E9/L (ref 1.8–7.3)
NEUTROPHILS RELATIVE PERCENT: 74.1 % (ref 43–80)
PDW BLD-RTO: 12.8 FL (ref 11.5–15)
PLATELET # BLD: 254 E9/L (ref 130–450)
PMV BLD AUTO: 10.4 FL (ref 7–12)
POTASSIUM SERPL-SCNC: 4.2 MMOL/L (ref 3.5–5)
RBC # BLD: 4.7 E12/L (ref 3.5–5.5)
SODIUM BLD-SCNC: 137 MMOL/L (ref 132–146)
TOTAL PROTEIN: 6.9 G/DL (ref 6.4–8.3)
WBC # BLD: 6.7 E9/L (ref 4.5–11.5)

## 2020-02-23 PROCEDURE — 6370000000 HC RX 637 (ALT 250 FOR IP): Performed by: INTERNAL MEDICINE

## 2020-02-23 PROCEDURE — 6360000002 HC RX W HCPCS: Performed by: INTERNAL MEDICINE

## 2020-02-23 PROCEDURE — 36415 COLL VENOUS BLD VENIPUNCTURE: CPT

## 2020-02-23 PROCEDURE — 2580000003 HC RX 258: Performed by: INTERNAL MEDICINE

## 2020-02-23 PROCEDURE — 2700000000 HC OXYGEN THERAPY PER DAY

## 2020-02-23 PROCEDURE — 99232 SBSQ HOSP IP/OBS MODERATE 35: CPT | Performed by: INTERNAL MEDICINE

## 2020-02-23 PROCEDURE — 83735 ASSAY OF MAGNESIUM: CPT

## 2020-02-23 PROCEDURE — 80053 COMPREHEN METABOLIC PANEL: CPT

## 2020-02-23 PROCEDURE — 99239 HOSP IP/OBS DSCHRG MGMT >30: CPT | Performed by: INTERNAL MEDICINE

## 2020-02-23 PROCEDURE — 2580000003 HC RX 258

## 2020-02-23 PROCEDURE — 85025 COMPLETE CBC W/AUTO DIFF WBC: CPT

## 2020-02-23 PROCEDURE — 94640 AIRWAY INHALATION TREATMENT: CPT

## 2020-02-23 RX ORDER — LISINOPRIL 20 MG/1
20 TABLET ORAL DAILY
Qty: 30 TABLET | Refills: 0 | Status: SHIPPED | OUTPATIENT
Start: 2020-02-24 | End: 2020-03-23 | Stop reason: SDUPTHER

## 2020-02-23 RX ORDER — FUROSEMIDE 40 MG/1
40 TABLET ORAL DAILY
Qty: 30 TABLET | Refills: 3 | Status: SHIPPED | OUTPATIENT
Start: 2020-02-24 | End: 2020-08-18 | Stop reason: SDUPTHER

## 2020-02-23 RX ORDER — POTASSIUM CHLORIDE 20 MEQ/1
40 TABLET, EXTENDED RELEASE ORAL
Qty: 60 TABLET | Refills: 0 | Status: SHIPPED | OUTPATIENT
Start: 2020-02-24 | End: 2020-03-23 | Stop reason: SDUPTHER

## 2020-02-23 RX ORDER — METOPROLOL SUCCINATE 100 MG/1
100 TABLET, EXTENDED RELEASE ORAL DAILY
Qty: 30 TABLET | Refills: 0 | Status: SHIPPED | OUTPATIENT
Start: 2020-02-24 | End: 2020-03-23 | Stop reason: SDUPTHER

## 2020-02-23 RX ORDER — LIDOCAINE 4 G/G
1 PATCH TOPICAL DAILY
Qty: 30 PATCH | Refills: 0 | Status: SHIPPED | OUTPATIENT
Start: 2020-02-24 | End: 2020-03-10

## 2020-02-23 RX ADMIN — IPRATROPIUM BROMIDE AND ALBUTEROL SULFATE 1 AMPULE: .5; 3 SOLUTION RESPIRATORY (INHALATION) at 07:36

## 2020-02-23 RX ADMIN — POTASSIUM CHLORIDE 40 MEQ: 20 TABLET, EXTENDED RELEASE ORAL at 08:11

## 2020-02-23 RX ADMIN — PANTOPRAZOLE SODIUM 40 MG: 40 TABLET, DELAYED RELEASE ORAL at 08:10

## 2020-02-23 RX ADMIN — METOPROLOL SUCCINATE 100 MG: 100 TABLET, EXTENDED RELEASE ORAL at 08:11

## 2020-02-23 RX ADMIN — SODIUM CHLORIDE, PRESERVATIVE FREE 10 ML: 5 INJECTION INTRAVENOUS at 08:10

## 2020-02-23 RX ADMIN — METHYLPREDNISOLONE SODIUM SUCCINATE 40 MG: 40 INJECTION, POWDER, LYOPHILIZED, FOR SOLUTION INTRAMUSCULAR; INTRAVENOUS at 05:41

## 2020-02-23 RX ADMIN — ARFORMOTEROL TARTRATE 15 MCG: 15 SOLUTION RESPIRATORY (INHALATION) at 07:35

## 2020-02-23 RX ADMIN — BUDESONIDE 500 MCG: 0.5 SUSPENSION RESPIRATORY (INHALATION) at 07:36

## 2020-02-23 RX ADMIN — LISINOPRIL 20 MG: 20 TABLET ORAL at 08:10

## 2020-02-23 RX ADMIN — FLUTICASONE PROPIONATE 2 SPRAY: 50 SPRAY, METERED NASAL at 08:13

## 2020-02-23 RX ADMIN — ENOXAPARIN SODIUM 40 MG: 40 INJECTION SUBCUTANEOUS at 08:10

## 2020-02-23 RX ADMIN — FUROSEMIDE 40 MG: 40 TABLET ORAL at 08:11

## 2020-02-23 RX ADMIN — WATER 10 ML: 1 INJECTION INTRAMUSCULAR; INTRAVENOUS; SUBCUTANEOUS at 05:41

## 2020-02-23 ASSESSMENT — PAIN SCALES - GENERAL
PAINLEVEL_OUTOF10: 0
PAINLEVEL_OUTOF10: 0

## 2020-02-23 NOTE — PROGRESS NOTES
INPATIENT CARDIOLOGY FOLLOW-UP    Name: Rohit Hammer    Age: 63 y.o. Date of Admission: 2/17/2020  9:35 PM    Date of Service: 2/23/2020    Primary Cardiologist: Dr. Justin Small    Chief Complaint: Follow-up for new left bundle branch block, atrial arrhythmia, nonischemic cardiomyopathy    Interim History:  Left heart catheterization yesterday revealed normal coronary arteries, confirming nonischemic etiology of cardiomyopathy. LVEDP was elevated at 31 mmHg. States she is still short of breath, still coughing and feeling much better and she is going home today. She is going to follow up with Dr. Justin Small. Echo echo shows LV is dilated, EF is mildly reduced at 40 to 45%, with mild to moderate MR. Sinus rhythm on the monitor. In and out of left bundle branch block. No further tachyarrhythmias.     Review of Systems:   Negative except as described above    Problem List:  Patient Active Problem List   Diagnosis    Arthritis    Asthma    Acute cystitis without hematuria    Viral URI    Bronchitis    Rheumatoid arthritis of multiple sites with negative rheumatoid factor (HCC)    Tachycardia    Cardiomyopathy ([facility] [address]       Current Medications:    Current Facility-Administered Medications:     lisinopril (PRINIVIL;ZESTRIL) tablet 20 mg, 20 mg, Oral, Daily, Jimi Morejon MD, 20 mg at 02/23/20 0810    ipratropium-albuterol (DUONEB) nebulizer solution 1 ampule, 1 ampule, Inhalation, Q4H PRN, Maximiliano Brody MD, 1 ampule at 02/21/20 0423    furosemide (LASIX) tablet 40 mg, 40 mg, Oral, Daily, Richelle Francis MD, 40 mg at 02/23/20 0200    potassium chloride (KLOR-CON M) extended release tablet 40 mEq, 40 mEq, Oral, Daily with breakfast, Richelle Fracnis MD, 40 mEq at 02/23/20 0811    lidocaine 4 % external patch 1 patch, 1 patch, Transdermal, Daily, Robert Mosquera MD, 1 patch at 02/23/20 8645    metoprolol succinate (TOPROL XL) extended release tablet 100 mg, 100 mg, Oral, Daily, Sun Franco Carleen Perla MD, 100 mg at 02/23/20 0811    budesonide (PULMICORT) nebulizer suspension 500 mcg, 500 mcg, Nebulization, BID, Robert Mehta MD, 500 mcg at 02/23/20 0736    fluticasone (FLONASE) 50 MCG/ACT nasal spray 2 spray, 2 spray, Each Nostril, Daily, Robert Mehta MD, 2 spray at 02/23/20 0813    Arformoterol Tartrate (BROVANA) nebulizer solution 15 mcg, 15 mcg, Nebulization, BID, Robert Mehta MD, 15 mcg at 02/23/20 0735    montelukast (SINGULAIR) tablet 10 mg, 10 mg, Oral, Nightly, Jessica Espitia MD, 10 mg at 02/22/20 2146    pantoprazole (PROTONIX) tablet 40 mg, 40 mg, Oral, Daily, Jessica Espitia MD, 40 mg at 02/23/20 0810    sodium chloride flush 0.9 % injection 10 mL, 10 mL, Intravenous, 2 times per day, Jessica Espitia MD, 10 mL at 02/23/20 0810    sodium chloride flush 0.9 % injection 10 mL, 10 mL, Intravenous, PRN, Jessica Espitia MD, 10 mL at 02/22/20 0541    magnesium hydroxide (MILK OF MAGNESIA) 400 MG/5ML suspension 30 mL, 30 mL, Oral, Daily PRN, Jessica Espitia MD    ondansetron [facility]) injection 4 mg, 4 mg, Intravenous, Q6H PRN, Jessica Espitia MD    enoxaparin (LOVENOX) injection 40 mg, 40 mg, Subcutaneous, Daily, Jessica Espitia MD, 40 mg at 02/23/20 0810    acetaminophen (TYLENOL) tablet 650 mg, 650 mg, Oral, Q4H PRN, Jessica Espitia MD, 650 mg at 02/22/20 0541    ipratropium-albuterol (DUONEB) nebulizer solution 1 ampule, 1 ampule, Inhalation, Q4H Orman Dakins, MD, 1 ampule at 02/23/20 0736    methylPREDNISolone sodium (SOLU-MEDROL) injection 40 mg, 40 mg, Intravenous, Q12H, Jessica Espitia MD, 40 mg at 02/23/20 0541    perflutren lipid microspheres (DEFINITY) injection 1.65 mg, 1.5 mL, Intravenous, ONCE PRN, Giovanna Bradley MD    Physical Exam:  BP (!) 123/58   Pulse 81   Temp 98.2 °F (36.8 °C) (Oral)   Resp 14   Ht 5' 10\" (1.778 m)   Wt 270 lb 8 oz (122.7 kg)   SpO2 (!) 89%   BMI 38.81 kg/m²   Wt Readings from Last 3 Encounters:   02/21/20 270 lb 8 oz (122.7 kg)   02/20/20 for: LABA1C  No results found for: EAG  Lab Results   Component Value Date    CHOL 170 11/09/2019     Lab Results   Component Value Date    TRIG 46 11/09/2019     Lab Results   Component Value Date    HDL 81 11/09/2019     Lab Results   Component Value Date    LDLCALC 80 11/09/2019     Lab Results   Component Value Date    LABVLDL 9 11/09/2019     No results found for: CHOLHDLRATIO  No results for input(s): PROBNP in the last 72 hours. Cardiac Tests:    EKG:   Initial EKG 2/17/2020 at 21: 43    Regular wide-complex tachycardia 134 bpm with left bundle branch block. No clear P waves.     2/18/2020 at 00 33  Similar appearing EKG with a wide-complex tachycardia, regular 137 bpm left bundle branch block. Clear P waves are not discernible.     2/18/20 8470  Sinus rhythm with clear P waves, left bundle branch block with a QRS duration 166 ms. Telemetry: Sinus rhythm 70s to 80s,  And no further episodes of SVT    Vitals : BP - 136/99>>123/58 with heart rate of 81    Labs reviewed. Stable    Chest X-ray:     The heart is enlarged. Acromioclavicular arthropathy. The heart, lungs, mediastinum and regional skeleton are otherwise  unremarkable.      Impression:         No evidence for acute cardiopulmonary process. Echocardiogram:    Summary   Left ventricle is severely dilated. LVEDD 6.9 cm.   LV systolic function is mildly reduced.   Ejection fraction is visually estimated at 40-45%.   Grade II diastolic dysfunction.   No regional wall motion abnormalities seen.   Normal left ventricular wall thickness.   Abnormal LV septal motion consistent with conduction disorder.   Normal right ventricular size and function.   The left atrium is moderately-severely dilated.   Mildly enlarged right atrium.   Mild thickening of the mitral valve leaflets.   Mild-moderate mitral regurgitation.     2D echocardiogram January 12, 2016 EF 65% with no mitral valve prolapse    Stress test: Exercise treadmill test 2006: 89% MPHR

## 2020-02-23 NOTE — PROGRESS NOTES
Discharge instructions given to pt as well as education regarding CHF and cardiomyopathy. All questions answered and addressed. Pt waiting for  to arrive.

## 2020-02-23 NOTE — DISCHARGE SUMMARY
normal  Neck: supple and non-tender without mass  Pulmonary/Chest: clear to auscultation bilaterally  Cardiovascular: normal rate, regular rhythm, normal S1 and S2  Abdomen: soft, non-tender, non-distended, normal bowel sounds, no masses or organomegaly  Extremities: no cyanosis, clubbing   Musculoskeletal: normal range of motion  Neurologic:no cranial nerve deficit,  speech normal        LABS:  Recent Labs     02/21/20 2105 02/22/20  0336 02/23/20  0239    138 137   K 4.1 3.8 4.2   CL 96* 96* 97*   CO2 29 30* 28   BUN 18 20 22*   CREATININE 0.7 0.6 0.6   GLUCOSE 134* 127* 136*   CALCIUM 9.3 8.9 9.4       Recent Labs     02/21/20  0451 02/22/20 0336 02/23/20  0239   WBC 6.2 5.3 6.7   RBC 4.48 4.59 4.70   HGB 13.3 13.8 13.9   HCT 42.0 42.9 43.7   MCV 93.8 93.5 93.0   MCH 29.7 30.1 29.6   MCHC 31.7* 32.2 31.8*   RDW 13.0 13.1 12.8    226 254   MPV 10.8 10.7 10.4       No results for input(s): POCGLU in the last 72 hours. Imaging:   XR CHEST PORTABLE   Final Result      No evidence for acute cardiopulmonary process.                 Patient Instructions:      Medication List      START taking these medications    furosemide 40 MG tablet  Commonly known as:  LASIX  Take 1 tablet by mouth daily  Start taking on:  February 24, 2020     lidocaine 4 % external patch  Place 1 patch onto the skin daily  Start taking on:  February 24, 2020     lisinopril 20 MG tablet  Commonly known as:  PRINIVIL;ZESTRIL  Take 1 tablet by mouth daily  Start taking on:  February 24, 2020     metoprolol succinate 100 MG extended release tablet  Commonly known as:  TOPROL XL  Take 1 tablet by mouth daily  Start taking on:  February 24, 2020     potassium chloride 20 MEQ extended release tablet  Commonly known as:  KLOR-CON M  Take 2 tablets by mouth daily (with breakfast)  Start taking on:  February 24, 2020        CONTINUE taking these medications    Adalimumab 40 MG/0.4ML Pnkt  Inject 40 mg Sub Q every other week - citrate

## 2020-02-23 NOTE — PROGRESS NOTES
Pt. Assessed this shift, she advised no changes in her feeling of heaviness, occasional SOB, pulse ox was at 90 percent, administered 2 lpm, improved, pt. Was able to ambulate on unit without issue.

## 2020-02-24 ENCOUNTER — TELEPHONE (OUTPATIENT)
Dept: PRIMARY CARE CLINIC | Age: 55
End: 2020-02-24

## 2020-02-24 LAB
EKG ATRIAL RATE: 77 BPM
EKG P AXIS: 25 DEGREES
EKG P-R INTERVAL: 180 MS
EKG Q-T INTERVAL: 408 MS
EKG QRS DURATION: 108 MS
EKG QTC CALCULATION (BAZETT): 461 MS
EKG R AXIS: 4 DEGREES
EKG T AXIS: 9 DEGREES
EKG VENTRICULAR RATE: 77 BPM

## 2020-02-24 PROCEDURE — 93010 ELECTROCARDIOGRAM REPORT: CPT | Performed by: INTERNAL MEDICINE

## 2020-02-24 NOTE — TELEPHONE ENCOUNTER
[facility] Transitions Initial Follow Up Call    Outreach made within 2 business days of discharge: Yes    Patient: Long Rashid Patient : 1965   MRN: [id number]  Reason for Admission: There are no discharge diagnoses documented for the most recent discharge. Discharge Date: 20       Spoke with: Vik Oliver    Discharge department/facility: [facility] Interactive Patient Contact:  Was patient able to fill all prescriptions: Yes  Was patient instructed to bring all medications to the follow-up visit: Yes  Is patient taking all medications as directed in the discharge summary?  Yes  Does patient understand their discharge instructions: Yes  Does patient have questions or concerns that need addressed prior to 7-14 day follow up office visit: no    Scheduled appointment with PCP within 7-14 days    Follow Up  Future Appointments   Date Time Provider Joana Chavez   2020 11:00 AM DO PRISCILLA Sanches [place], [place]

## 2020-02-25 ENCOUNTER — TELEPHONE (OUTPATIENT)
Dept: ADMINISTRATIVE | Age: 55
End: 2020-02-25

## 2020-02-26 ENCOUNTER — OFFICE VISIT (OUTPATIENT)
Dept: PRIMARY CARE CLINIC | Age: 55
End: 2020-02-26
Payer: COMMERCIAL

## 2020-02-26 VITALS
WEIGHT: 266.6 LBS | BODY MASS INDEX: 38.17 KG/M2 | DIASTOLIC BLOOD PRESSURE: 80 MMHG | HEIGHT: 70 IN | HEART RATE: 74 BPM | RESPIRATION RATE: 16 BRPM | SYSTOLIC BLOOD PRESSURE: 122 MMHG | OXYGEN SATURATION: 95 %

## 2020-02-26 PROCEDURE — 99495 TRANSJ CARE MGMT MOD F2F 14D: CPT | Performed by: FAMILY MEDICINE

## 2020-02-26 PROCEDURE — 1111F DSCHRG MED/CURRENT MED MERGE: CPT | Performed by: FAMILY MEDICINE

## 2020-02-26 RX ORDER — SULFASALAZINE 500 MG/1
1000 TABLET ORAL 2 TIMES DAILY
Qty: 120 TABLET | Refills: 1 | Status: SHIPPED
Start: 2020-02-26 | End: 2020-06-14 | Stop reason: SDUPTHER

## 2020-02-26 NOTE — PROGRESS NOTES
Post-Discharge Transitional Care Management Services or Hospital Follow Up      Sofia Carias   YOB: 1965    Date of Office Visit:  2/26/2020  Date of Hospital Admission: 2/17/20  Date of Hospital Discharge: 2/23/20  Risk of hospital readmission (high >=14%.  Medium >=10%) :Readmission Risk Score: 14      Care management risk score Rising risk (score 2-5) and Complex Care (Scores >=6): 5     Non face to face  following discharge, date last encounter closed (first attempt may have been earlier): 2/24/2020  1:44 PM    Call initiated 2 business days of discharge: Yes    Patient Active Problem List   Diagnosis    Arthritis    Asthma    Acute cystitis without hematuria    Viral URI    Bronchitis    Rheumatoid arthritis of multiple sites with negative rheumatoid factor ([facility]    Tachycardia    Cardiomyopathy ([facility] [facility]       Allergies   Allergen Reactions    Penicillins        Medications listed as ordered at the time of discharge from hospital   Ines Grajeda Medication Instructions DEEJAY:    Printed on:02/26/20 1127   Medication Information                      Adalimumab 40 MG/0.4ML PNKT  Inject 40 mg Sub Q every other week - citrate free             albuterol sulfate  (90 Base) MCG/ACT inhaler  Inhale 2 puffs into the lungs 4 times daily as needed for Wheezing             cetirizine (ZYRTEC) 10 MG tablet  Take 10 mg by mouth daily             Cholecalciferol (VITAMIN D) 2000 units CAPS capsule  Take by mouth             diclofenac sodium 1 % GEL  Apply 4 g topically 4 times daily             Fluticasone Furoate-Vilanterol (BREO ELLIPTA) 200-25 MCG/INH AEPB  Inhale 1 puff into the lungs daily             furosemide (LASIX) 40 MG tablet  Take 1 tablet by mouth daily             hydroxychloroquine (PLAQUENIL) 200 MG tablet  Take 2 tablets by mouth 2 times daily             lidocaine 4 % external patch  Place 1 patch onto the skin daily             lisinopril (PRINIVIL;ZESTRIL) 20 MG 1 tablet by mouth daily 30 tablet 5    sulfaSALAzine (AZULFIDINE) 500 MG tablet Take 2 tablets by mouth 2 times daily 120 tablet 1    diclofenac sodium 1 % GEL Apply 4 g topically 4 times daily 4 Tube 1    Adalimumab 40 MG/0.4ML PNKT Inject 40 mg Sub Q every other week - citrate free 2 each 2    meloxicam (MOBIC) 15 MG tablet Take 1 tablet by mouth daily 90 tablet 1    predniSONE (DELTASONE) 5 MG tablet Take 1 tablet by mouth daily 90 tablet 1    Fluticasone Furoate-Vilanterol (BREO ELLIPTA) 200-25 MCG/INH AEPB Inhale 1 puff into the lungs daily 2 each 5    albuterol sulfate  (90 Base) MCG/ACT inhaler Inhale 2 puffs into the lungs 4 times daily as needed for Wheezing 1 Inhaler 0    Cholecalciferol (VITAMIN D) 2000 units CAPS capsule Take by mouth      cetirizine (ZYRTEC) 10 MG tablet Take 10 mg by mouth daily          Medications patient taking as of now reconciled against medications ordered at time of hospital discharge: Yes    Chief Complaint   Patient presents with    Care Management     TCM       History of Present illness - Follow up of Hospital diagnosis(es): influenza B bronchitis  cardiomyopathy    Inpatient course: Discharge summary reviewed- see chart. Interval history/Current status: stable    A comprehensive review of systems was negative except for what was noted in the HPI. Vitals:    02/26/20 1104   BP: 122/80   Pulse: 74   Resp: 16   SpO2: 95%   Weight: 266 lb 9.6 oz (120.9 kg)   Height: 5' 10\" (1.778 m)     Body mass index is 38.25 kg/m².    Wt Readings from Last 3 Encounters:   02/26/20 266 lb 9.6 oz (120.9 kg)   02/21/20 270 lb 8 oz (122.7 kg)   02/20/20 267 lb (121.1 kg)     BP Readings from Last 3 Encounters:   02/26/20 122/80   02/23/20 (!) 123/58   02/14/20 130/88        Physical Exam:  General Appearance: alert and oriented to person, place and time, well developed and well- nourished, in no acute distress  Skin: warm and dry, no rash or erythema  Head: normocephalic

## 2020-02-26 NOTE — LETTER
[address]  Puneet ORNELAS [address]  Phone: [phone]  Fax: [address], DO        February 26, 2020     Patient: Alissa Muller   YOB: 1965   Date of Visit: 2/26/2020       To Whom It May Concern: It is my medical opinion that Alissa Muller should remain out of work until 3-2-2020. .    If you have any questions or concerns, please don't hesitate to call.     Sincerely,        Kenan Demarco DO

## 2020-02-27 NOTE — TELEPHONE ENCOUNTER
Would prefer she doesnt hold them at all unless really not tolerating. She can try splitting the medications. She can take one at night and one in the morning for example can try taking lisinopril at night and metoprolol in the am. Is she remains symptomatic then we can dial back the dose.  If bp consistently less than 90 systolic (top number) or is continues to have symptoms let us know

## 2020-03-10 ENCOUNTER — OFFICE VISIT (OUTPATIENT)
Dept: CARDIOLOGY CLINIC | Age: 55
End: 2020-03-10
Payer: COMMERCIAL

## 2020-03-10 VITALS
BODY MASS INDEX: 39.01 KG/M2 | HEIGHT: 70 IN | DIASTOLIC BLOOD PRESSURE: 70 MMHG | SYSTOLIC BLOOD PRESSURE: 124 MMHG | WEIGHT: 272.5 LBS | HEART RATE: 68 BPM

## 2020-03-10 PROBLEM — I44.7 LBBB (LEFT BUNDLE BRANCH BLOCK): Status: ACTIVE | Noted: 2020-03-10

## 2020-03-10 PROCEDURE — 93000 ELECTROCARDIOGRAM COMPLETE: CPT | Performed by: INTERNAL MEDICINE

## 2020-03-10 PROCEDURE — 99214 OFFICE O/P EST MOD 30 MIN: CPT | Performed by: INTERNAL MEDICINE

## 2020-03-10 RX ORDER — PREDNISONE 20 MG/1
TABLET ORAL
Qty: 11 TABLET | Refills: 0 | Status: SHIPPED
Start: 2020-03-10 | End: 2020-03-23

## 2020-03-10 NOTE — PROGRESS NOTES
Results   Component Value Date    MG 2.1 02/23/2020     Lab Results   Component Value Date    WBC 6.7 02/23/2020    HGB 13.9 02/23/2020    HCT 43.7 02/23/2020    MCV 93.0 02/23/2020     02/23/2020     Lab Results   Component Value Date    ALT 54 (H) 02/23/2020    AST 16 02/23/2020    ALKPHOS 60 02/23/2020    BILITOT 0.4 02/23/2020     Lab Results   Component Value Date    CKTOTAL 180 02/21/2020    CKMB 2.0 02/21/2020    TROPONINI <0.01 02/21/2020    TROPONINI <0.01 02/19/2020    TROPONINI 0.01 02/18/2020     No results found for: INR, PROTIME  Lab Results   Component Value Date    TSH 1.770 02/18/2020     No results found for: LABA1C  No results found for: EAG  Lab Results   Component Value Date    CHOL 170 11/09/2019     Lab Results   Component Value Date    TRIG 46 11/09/2019     Lab Results   Component Value Date    HDL 81 11/09/2019     Lab Results   Component Value Date    LDLCALC 80 11/09/2019     Lab Results   Component Value Date    LABVLDL 9 11/09/2019     No results found for: CHOLHDLRATIO  No results for input(s): PROBNP in the last 72 hours. Cardiac Tests:  ECG: Sinus rhythm 68 bpm.  Normal axis. IVCD QRS duration 102 ms. First-degree AV block with KY interval 234 ms. Chest X-ray: 2/17/2020  Impression       No evidence for acute cardiopulmonary process. Echocardiogram:    Summary   Left ventricle is severely dilated. LVEDD 6.9 cm.   LV systolic function is mildly reduced. Ejection fraction is visually estimated at 40-45%. Grade II diastolic dysfunction. No regional wall motion abnormalities seen. Normal left ventricular wall thickness. Abnormal LV septal motion consistent with conduction disorder. Normal right ventricular size and function. The left atrium is moderately-severely dilated. Mildly enlarged right atrium. Mild thickening of the mitral valve leaflets. Mild-moderate mitral regurgitation.     Stress test:      Cardiac catheterization: 2/20/2020,

## 2020-03-13 ENCOUNTER — HOSPITAL ENCOUNTER (OUTPATIENT)
Dept: MRI IMAGING | Age: 55
Discharge: HOME | End: 2020-03-15
Payer: COMMERCIAL

## 2020-03-13 PROCEDURE — 6360000004 HC RX CONTRAST MEDICATION: Performed by: RADIOLOGY

## 2020-03-13 PROCEDURE — A9579 GAD-BASE MR CONTRAST NOS,1ML: HCPCS | Performed by: RADIOLOGY

## 2020-03-13 PROCEDURE — 75561 CARDIAC MRI FOR MORPH W/DYE: CPT

## 2020-03-13 RX ADMIN — GADOTERIDOL 40 ML: 279.3 INJECTION, SOLUTION INTRAVENOUS at 09:25

## 2020-03-14 LAB
LV EF: 48 %
LVEF MODALITY: NORMAL

## 2020-03-19 ENCOUNTER — TELEPHONE (OUTPATIENT)
Dept: PRIMARY CARE CLINIC | Age: 55
End: 2020-03-19

## 2020-03-20 ENCOUNTER — HOSPITAL ENCOUNTER (OUTPATIENT)
Age: 55
Discharge: HOME | End: 2020-03-22
Payer: COMMERCIAL

## 2020-03-20 LAB
ALBUMIN SERPL-MCNC: 4.1 G/DL (ref 3.5–5.2)
ALP BLD-CCNC: 63 U/L (ref 35–104)
ALT SERPL-CCNC: 21 U/L (ref 0–32)
ANION GAP SERPL CALCULATED.3IONS-SCNC: 11 MMOL/L (ref 7–16)
AST SERPL-CCNC: 19 U/L (ref 0–31)
BILIRUB SERPL-MCNC: 0.3 MG/DL (ref 0–1.2)
BUN BLDV-MCNC: 20 MG/DL (ref 6–20)
CALCIUM SERPL-MCNC: 9.8 MG/DL (ref 8.6–10.2)
CHLORIDE BLD-SCNC: 103 MMOL/L (ref 98–107)
CO2: 27 MMOL/L (ref 22–29)
CREAT SERPL-MCNC: 0.7 MG/DL (ref 0.5–1)
GFR AFRICAN AMERICAN: >60
GFR NON-AFRICAN AMERICAN: >60 ML/MIN/1.73
GLUCOSE BLD-MCNC: 108 MG/DL (ref 74–99)
POTASSIUM SERPL-SCNC: 4.4 MMOL/L (ref 3.5–5)
SODIUM BLD-SCNC: 141 MMOL/L (ref 132–146)
TOTAL PROTEIN: 7 G/DL (ref 6.4–8.3)

## 2020-03-20 PROCEDURE — 80053 COMPREHEN METABOLIC PANEL: CPT

## 2020-03-20 PROCEDURE — 36415 COLL VENOUS BLD VENIPUNCTURE: CPT

## 2020-03-22 ENCOUNTER — PATIENT MESSAGE (OUTPATIENT)
Dept: PRIMARY CARE CLINIC | Age: 55
End: 2020-03-22

## 2020-03-23 ENCOUNTER — PATIENT MESSAGE (OUTPATIENT)
Dept: PRIMARY CARE CLINIC | Age: 55
End: 2020-03-23

## 2020-03-23 RX ORDER — POTASSIUM CHLORIDE 20 MEQ/1
40 TABLET, EXTENDED RELEASE ORAL
Qty: 60 TABLET | Refills: 0 | Status: SHIPPED
Start: 2020-03-23 | End: 2020-05-03 | Stop reason: SDUPTHER

## 2020-03-23 RX ORDER — METOPROLOL SUCCINATE 100 MG/1
100 TABLET, EXTENDED RELEASE ORAL DAILY
Qty: 30 TABLET | Refills: 0 | Status: SHIPPED
Start: 2020-03-23 | End: 2020-04-23 | Stop reason: SDUPTHER

## 2020-03-23 RX ORDER — LISINOPRIL 20 MG/1
20 TABLET ORAL DAILY
Qty: 30 TABLET | Refills: 0 | Status: SHIPPED
Start: 2020-03-23 | End: 2020-04-20 | Stop reason: SINTOL

## 2020-03-23 RX ORDER — PREDNISONE 1 MG/1
5 TABLET ORAL DAILY
Qty: 90 TABLET | Refills: 1 | Status: ON HOLD
Start: 2020-03-23 | End: 2020-07-15 | Stop reason: HOSPADM

## 2020-03-24 ENCOUNTER — TELEPHONE (OUTPATIENT)
Dept: CARDIOLOGY CLINIC | Age: 55
End: 2020-03-24

## 2020-04-20 ENCOUNTER — TELEPHONE (OUTPATIENT)
Dept: CARDIOLOGY CLINIC | Age: 55
End: 2020-04-20

## 2020-04-20 RX ORDER — VALSARTAN 80 MG/1
80 TABLET ORAL 2 TIMES DAILY
Qty: 60 TABLET | Refills: 3 | Status: SHIPPED | OUTPATIENT
Start: 2020-04-20 | End: 2020-08-18 | Stop reason: SDUPTHER

## 2020-04-20 NOTE — TELEPHONE ENCOUNTER
----- Message from Shahida Gregory MD sent at 4/20/2020 12:26 PM EDT -----  Changed lisinopril to valsartan after discussion with Dr Dominic Joyce I sent script to pharmacy can you let her know please.   80 bid thanks

## 2020-04-23 RX ORDER — METOPROLOL SUCCINATE 100 MG/1
100 TABLET, EXTENDED RELEASE ORAL DAILY
Qty: 30 TABLET | Refills: 0 | Status: SHIPPED
Start: 2020-04-23 | End: 2020-05-03 | Stop reason: SDUPTHER

## 2020-05-04 RX ORDER — METOPROLOL SUCCINATE 100 MG/1
100 TABLET, EXTENDED RELEASE ORAL DAILY
Qty: 30 TABLET | Refills: 0 | Status: SHIPPED | OUTPATIENT
Start: 2020-05-04 | End: 2020-08-18 | Stop reason: SDUPTHER

## 2020-05-04 RX ORDER — POTASSIUM CHLORIDE 20 MEQ/1
40 TABLET, EXTENDED RELEASE ORAL
Qty: 60 TABLET | Refills: 0 | Status: SHIPPED
Start: 2020-05-04 | End: 2020-06-14 | Stop reason: SDUPTHER

## 2020-05-30 ENCOUNTER — HOSPITAL ENCOUNTER (OUTPATIENT)
Dept: SLEEP CENTER | Age: 55
Discharge: HOME | End: 2020-05-30
Payer: COMMERCIAL

## 2020-05-30 PROCEDURE — 95810 POLYSOM 6/> YRS 4/> PARAM: CPT

## 2020-05-31 VITALS
BODY MASS INDEX: 39.37 KG/M2 | HEIGHT: 70 IN | OXYGEN SATURATION: 92 % | RESPIRATION RATE: 20 BRPM | WEIGHT: 275 LBS | SYSTOLIC BLOOD PRESSURE: 122 MMHG | DIASTOLIC BLOOD PRESSURE: 74 MMHG | HEART RATE: 71 BPM | TEMPERATURE: 97.2 F

## 2020-05-31 ASSESSMENT — SLEEP AND FATIGUE QUESTIONNAIRES
HOW LIKELY ARE YOU TO NOD OFF OR FALL ASLEEP WHILE SITTING INACTIVE IN A PUBLIC PLACE: 0
HOW LIKELY ARE YOU TO NOD OFF OR FALL ASLEEP WHILE LYING DOWN TO REST IN THE AFTERNOON WHEN CIRCUMSTANCES PERMIT: 1
HOW LIKELY ARE YOU TO NOD OFF OR FALL ASLEEP WHEN YOU ARE A PASSENGER IN A CAR FOR AN HOUR WITHOUT A BREAK: 3
HOW LIKELY ARE YOU TO NOD OFF OR FALL ASLEEP WHILE SITTING AND READING: 3
HOW LIKELY ARE YOU TO NOD OFF OR FALL ASLEEP WHILE SITTING QUIETLY AFTER LUNCH WITHOUT ALCOHOL: 0
HOW LIKELY ARE YOU TO NOD OFF OR FALL ASLEEP WHILE SITTING AND TALKING TO SOMEONE: 0
ESS TOTAL SCORE: 10
HOW LIKELY ARE YOU TO NOD OFF OR FALL ASLEEP WHILE WATCHING TV: 3
HOW LIKELY ARE YOU TO NOD OFF OR FALL ASLEEP IN A CAR, WHILE STOPPED FOR A FEW MINUTES IN TRAFFIC: 0

## 2020-06-03 NOTE — PROGRESS NOTES
[address] [address]                               SLEEP STUDY REPORT    PATIENT NAME: Miranda Aragon                   :        1965  MED REC NO:   [id number]                            ROOM:  ACCOUNT NO:   [de-identified]                           ADMIT DATE: 2020  PROVIDER:     Angelo Riggs MD    DATE OF STUDY:  2020    INDICATION FOR POLYSOMNOGRAPHY:  Witnessed apnea. ESS 10. Neck  circumference 17.5. BMI 40. Diagnostic study was ordered by Dr. Demetris San. MEDICATIONS: See list, none implicated in sleep disturbance. SLEEP ARCHITECTURE:  The patient spent a total of 381 minutes in bed and  slept 317 minutes for an overall sleep efficiency of 78%. Time to sleep  onset was 27 minutes. Two REM periods were recorded with a latency of  about 2 hours. SLEEP STAGES:  The patient was in N1 sleep for 7%, N2 sleep for 75%, N3  sleep for 5%, and REM sleep for 13% of the study. VENTILATION SUMMARY:  88 obstructive events were recorded for an overall  RDI of 16. The REM index was 57.    69 arousals recorded during the study for an overall index of 10. OXYGENATION:  Mean oxyhemoglobin saturation awake in the supine position  was 91%. Minimal SpO2 recorded during the study was 73%. Examination of the histogram shows some fragmentation of sleep stages  with significant oxyhemoglobin desaturation and an elevated RDI with REM  sleep. Limb movements were scattered, but did not appear to cause  arousals. IMPRESSION:  Severe obstructive sleep apnea with significant  oxyhemoglobin desaturation exacerbated by REM sleep. RECOMMENDATIONS:  On the basis of the number of events and oxyhemoglobin  desaturation, a repeat sleep study with titration of positive airway  pressure is recommended.         Kostas Harden MD      D: 2020 17:14:27       T: 2020 17:19:54     [id number]  Job#: [id number]     Doc#: [id number]    CC: Norbert Whitfield

## 2020-06-15 RX ORDER — POTASSIUM CHLORIDE 20 MEQ/1
40 TABLET, EXTENDED RELEASE ORAL
Qty: 60 TABLET | Refills: 0 | Status: SHIPPED
Start: 2020-06-15 | End: 2020-08-18 | Stop reason: SDUPTHER

## 2020-06-15 RX ORDER — SULFASALAZINE 500 MG/1
1000 TABLET ORAL 2 TIMES DAILY
Qty: 120 TABLET | Refills: 1 | Status: SHIPPED
Start: 2020-06-15 | End: 2021-02-17

## 2020-07-06 ENCOUNTER — TELEPHONE (OUTPATIENT)
Dept: SLEEP CENTER | Age: 55
End: 2020-07-06

## 2020-07-06 NOTE — TELEPHONE ENCOUNTER
Called patient on 6-5 and 6-23. Letter sent to schedule sleep study. Patient stated she wanted to talk to her physician first. Still no response to set up appointment.

## 2020-07-07 ENCOUNTER — APPOINTMENT (OUTPATIENT)
Dept: CT IMAGING | Age: 55
DRG: 177 | End: 2020-07-07
Payer: COMMERCIAL

## 2020-07-07 ENCOUNTER — APPOINTMENT (OUTPATIENT)
Dept: GENERAL RADIOLOGY | Age: 55
DRG: 177 | End: 2020-07-07
Payer: COMMERCIAL

## 2020-07-07 ENCOUNTER — HOSPITAL ENCOUNTER (INPATIENT)
Age: 55
LOS: 8 days | Discharge: HOME | DRG: 177 | End: 2020-07-15
Attending: EMERGENCY MEDICINE | Admitting: INTERNAL MEDICINE
Payer: COMMERCIAL

## 2020-07-07 PROBLEM — U07.1 COVID-19: Status: ACTIVE | Noted: 2020-07-07

## 2020-07-07 LAB
ALBUMIN SERPL-MCNC: 3.6 G/DL (ref 3.5–5.2)
ALP BLD-CCNC: 91 U/L (ref 35–104)
ALT SERPL-CCNC: 54 U/L (ref 0–32)
ANION GAP SERPL CALCULATED.3IONS-SCNC: 16 MMOL/L (ref 7–16)
AST SERPL-CCNC: 57 U/L (ref 0–31)
BACTERIA: ABNORMAL /HPF
BASOPHILS ABSOLUTE: 0.01 E9/L (ref 0–0.2)
BASOPHILS RELATIVE PERCENT: 0.2 % (ref 0–2)
BILIRUB SERPL-MCNC: 0.5 MG/DL (ref 0–1.2)
BILIRUBIN URINE: ABNORMAL
BLOOD, URINE: NEGATIVE
BUN BLDV-MCNC: 13 MG/DL (ref 6–20)
CALCIUM SERPL-MCNC: 8.6 MG/DL (ref 8.6–10.2)
CHLORIDE BLD-SCNC: 100 MMOL/L (ref 98–107)
CLARITY: CLEAR
CO2: 26 MMOL/L (ref 22–29)
COLOR: ABNORMAL
CREAT SERPL-MCNC: 0.7 MG/DL (ref 0.5–1)
EKG ATRIAL RATE: 125 BPM
EKG P-R INTERVAL: 120 MS
EKG Q-T INTERVAL: 390 MS
EKG QRS DURATION: 164 MS
EKG QTC CALCULATION (BAZETT): 562 MS
EKG R AXIS: -13 DEGREES
EKG T AXIS: 44 DEGREES
EKG VENTRICULAR RATE: 125 BPM
EOSINOPHILS ABSOLUTE: 0 E9/L (ref 0.05–0.5)
EOSINOPHILS RELATIVE PERCENT: 0 % (ref 0–6)
GFR AFRICAN AMERICAN: >60
GFR NON-AFRICAN AMERICAN: >60 ML/MIN/1.73
GLUCOSE BLD-MCNC: 132 MG/DL (ref 74–99)
GLUCOSE URINE: NEGATIVE MG/DL
HCT VFR BLD CALC: 43.1 % (ref 34–48)
HEMOGLOBIN: 14.1 G/DL (ref 11.5–15.5)
IMMATURE GRANULOCYTES #: 0.01 E9/L
IMMATURE GRANULOCYTES %: 0.2 % (ref 0–5)
KETONES, URINE: >=80 MG/DL
LACTIC ACID: 1.4 MMOL/L (ref 0.5–2.2)
LEUKOCYTE ESTERASE, URINE: ABNORMAL
LYMPHOCYTES ABSOLUTE: 0.64 E9/L (ref 1.5–4)
LYMPHOCYTES RELATIVE PERCENT: 14.7 % (ref 20–42)
MAGNESIUM: 1.9 MG/DL (ref 1.6–2.6)
MCH RBC QN AUTO: 30.1 PG (ref 26–35)
MCHC RBC AUTO-ENTMCNC: 32.7 % (ref 32–34.5)
MCV RBC AUTO: 91.9 FL (ref 80–99.9)
MONOCYTES ABSOLUTE: 0.22 E9/L (ref 0.1–0.95)
MONOCYTES RELATIVE PERCENT: 5.1 % (ref 2–12)
NEUTROPHILS ABSOLUTE: 3.46 E9/L (ref 1.8–7.3)
NEUTROPHILS RELATIVE PERCENT: 79.8 % (ref 43–80)
NITRITE, URINE: NEGATIVE
PDW BLD-RTO: 12.1 FL (ref 11.5–15)
PH UA: 6 (ref 5–9)
PLATELET # BLD: 197 E9/L (ref 130–450)
PMV BLD AUTO: 9.9 FL (ref 7–12)
POTASSIUM REFLEX MAGNESIUM: 3.3 MMOL/L (ref 3.5–5)
PRO-BNP: 1258 PG/ML (ref 0–125)
PROTEIN UA: 30 MG/DL
RBC # BLD: 4.69 E12/L (ref 3.5–5.5)
RBC UA: ABNORMAL /HPF (ref 0–2)
SARS-COV-2, NAAT: DETECTED
SODIUM BLD-SCNC: 142 MMOL/L (ref 132–146)
SPECIFIC GRAVITY UA: >=1.03 (ref 1–1.03)
TOTAL PROTEIN: 7 G/DL (ref 6.4–8.3)
TROPONIN: <0.01 NG/ML (ref 0–0.03)
UROBILINOGEN, URINE: 0.2 E.U./DL
WBC # BLD: 4.3 E9/L (ref 4.5–11.5)
WBC UA: ABNORMAL /HPF (ref 0–5)

## 2020-07-07 PROCEDURE — 93010 ELECTROCARDIOGRAM REPORT: CPT | Performed by: INTERNAL MEDICINE

## 2020-07-07 PROCEDURE — 83880 ASSAY OF NATRIURETIC PEPTIDE: CPT

## 2020-07-07 PROCEDURE — 71275 CT ANGIOGRAPHY CHEST: CPT

## 2020-07-07 PROCEDURE — 6370000000 HC RX 637 (ALT 250 FOR IP): Performed by: STUDENT IN AN ORGANIZED HEALTH CARE EDUCATION/TRAINING PROGRAM

## 2020-07-07 PROCEDURE — 81001 URINALYSIS AUTO W/SCOPE: CPT

## 2020-07-07 PROCEDURE — 85025 COMPLETE CBC W/AUTO DIFF WBC: CPT

## 2020-07-07 PROCEDURE — U0002 COVID-19 LAB TEST NON-CDC: HCPCS

## 2020-07-07 PROCEDURE — 6360000002 HC RX W HCPCS: Performed by: INTERNAL MEDICINE

## 2020-07-07 PROCEDURE — 2580000003 HC RX 258: Performed by: STUDENT IN AN ORGANIZED HEALTH CARE EDUCATION/TRAINING PROGRAM

## 2020-07-07 PROCEDURE — 96374 THER/PROPH/DIAG INJ IV PUSH: CPT

## 2020-07-07 PROCEDURE — 6360000004 HC RX CONTRAST MEDICATION: Performed by: RADIOLOGY

## 2020-07-07 PROCEDURE — 2500000003 HC RX 250 WO HCPCS: Performed by: STUDENT IN AN ORGANIZED HEALTH CARE EDUCATION/TRAINING PROGRAM

## 2020-07-07 PROCEDURE — 94760 N-INVAS EAR/PLS OXIMETRY 1: CPT

## 2020-07-07 PROCEDURE — 84484 ASSAY OF TROPONIN QUANT: CPT

## 2020-07-07 PROCEDURE — 2060000000 HC ICU INTERMEDIATE R&B

## 2020-07-07 PROCEDURE — 83605 ASSAY OF LACTIC ACID: CPT

## 2020-07-07 PROCEDURE — 2580000003 HC RX 258: Performed by: RADIOLOGY

## 2020-07-07 PROCEDURE — 71045 X-RAY EXAM CHEST 1 VIEW: CPT

## 2020-07-07 PROCEDURE — 6360000002 HC RX W HCPCS: Performed by: STUDENT IN AN ORGANIZED HEALTH CARE EDUCATION/TRAINING PROGRAM

## 2020-07-07 PROCEDURE — 96361 HYDRATE IV INFUSION ADD-ON: CPT

## 2020-07-07 PROCEDURE — 93005 ELECTROCARDIOGRAM TRACING: CPT | Performed by: STUDENT IN AN ORGANIZED HEALTH CARE EDUCATION/TRAINING PROGRAM

## 2020-07-07 PROCEDURE — 2580000003 HC RX 258: Performed by: INTERNAL MEDICINE

## 2020-07-07 PROCEDURE — 6370000000 HC RX 637 (ALT 250 FOR IP): Performed by: INTERNAL MEDICINE

## 2020-07-07 PROCEDURE — 83735 ASSAY OF MAGNESIUM: CPT

## 2020-07-07 PROCEDURE — 99285 EMERGENCY DEPT VISIT HI MDM: CPT

## 2020-07-07 PROCEDURE — 80053 COMPREHEN METABOLIC PANEL: CPT

## 2020-07-07 PROCEDURE — 96375 TX/PRO/DX INJ NEW DRUG ADDON: CPT

## 2020-07-07 PROCEDURE — 99223 1ST HOSP IP/OBS HIGH 75: CPT | Performed by: INTERNAL MEDICINE

## 2020-07-07 RX ORDER — LOPERAMIDE HYDROCHLORIDE 2 MG/1
2 CAPSULE ORAL ONCE
Status: COMPLETED | OUTPATIENT
Start: 2020-07-07 | End: 2020-07-07

## 2020-07-07 RX ORDER — POTASSIUM CHLORIDE 7.45 MG/ML
10 INJECTION INTRAVENOUS
Status: COMPLETED | OUTPATIENT
Start: 2020-07-07 | End: 2020-07-07

## 2020-07-07 RX ORDER — PREDNISONE 1 MG/1
5 TABLET ORAL DAILY
Status: DISCONTINUED | OUTPATIENT
Start: 2020-07-07 | End: 2020-07-13 | Stop reason: SDUPTHER

## 2020-07-07 RX ORDER — PANTOPRAZOLE SODIUM 40 MG/1
40 TABLET, DELAYED RELEASE ORAL DAILY
Status: DISCONTINUED | OUTPATIENT
Start: 2020-07-07 | End: 2020-07-15 | Stop reason: HOSPADM

## 2020-07-07 RX ORDER — METOPROLOL SUCCINATE 100 MG/1
100 TABLET, EXTENDED RELEASE ORAL DAILY
Status: DISCONTINUED | OUTPATIENT
Start: 2020-07-07 | End: 2020-07-15 | Stop reason: HOSPADM

## 2020-07-07 RX ORDER — ONDANSETRON 2 MG/ML
4 INJECTION INTRAMUSCULAR; INTRAVENOUS ONCE
Status: COMPLETED | OUTPATIENT
Start: 2020-07-07 | End: 2020-07-07

## 2020-07-07 RX ORDER — CETIRIZINE HYDROCHLORIDE 10 MG/1
10 TABLET ORAL DAILY
Status: DISCONTINUED | OUTPATIENT
Start: 2020-07-07 | End: 2020-07-15 | Stop reason: HOSPADM

## 2020-07-07 RX ORDER — ALBUTEROL SULFATE 2.5 MG/3ML
2.5 SOLUTION RESPIRATORY (INHALATION) EVERY 6 HOURS PRN
COMMUNITY

## 2020-07-07 RX ORDER — HYDROXYCHLOROQUINE SULFATE 200 MG/1
400 TABLET, FILM COATED ORAL 2 TIMES DAILY
Status: DISCONTINUED | OUTPATIENT
Start: 2020-07-07 | End: 2020-07-13

## 2020-07-07 RX ORDER — ACETAMINOPHEN 325 MG/1
650 TABLET ORAL EVERY 6 HOURS PRN
Status: DISCONTINUED | OUTPATIENT
Start: 2020-07-07 | End: 2020-07-15 | Stop reason: HOSPADM

## 2020-07-07 RX ORDER — MONTELUKAST SODIUM 10 MG/1
10 TABLET ORAL NIGHTLY
Status: DISCONTINUED | OUTPATIENT
Start: 2020-07-07 | End: 2020-07-15 | Stop reason: HOSPADM

## 2020-07-07 RX ORDER — PROMETHAZINE HYDROCHLORIDE 25 MG/1
12.5 TABLET ORAL EVERY 6 HOURS PRN
Status: DISCONTINUED | OUTPATIENT
Start: 2020-07-07 | End: 2020-07-07

## 2020-07-07 RX ORDER — POLYETHYLENE GLYCOL 3350 17 G/17G
17 POWDER, FOR SOLUTION ORAL DAILY PRN
Status: DISCONTINUED | OUTPATIENT
Start: 2020-07-07 | End: 2020-07-15 | Stop reason: HOSPADM

## 2020-07-07 RX ORDER — SODIUM CHLORIDE 0.9 % (FLUSH) 0.9 %
10 SYRINGE (ML) INJECTION EVERY 12 HOURS SCHEDULED
Status: DISCONTINUED | OUTPATIENT
Start: 2020-07-07 | End: 2020-07-15 | Stop reason: HOSPADM

## 2020-07-07 RX ORDER — ACETAMINOPHEN 650 MG/1
650 SUPPOSITORY RECTAL EVERY 6 HOURS PRN
Status: DISCONTINUED | OUTPATIENT
Start: 2020-07-07 | End: 2020-07-15 | Stop reason: HOSPADM

## 2020-07-07 RX ORDER — SULFASALAZINE 500 MG/1
1000 TABLET ORAL 2 TIMES DAILY
Status: DISCONTINUED | OUTPATIENT
Start: 2020-07-07 | End: 2020-07-15 | Stop reason: HOSPADM

## 2020-07-07 RX ORDER — 0.9 % SODIUM CHLORIDE 0.9 %
1000 INTRAVENOUS SOLUTION INTRAVENOUS ONCE
Status: COMPLETED | OUTPATIENT
Start: 2020-07-07 | End: 2020-07-07

## 2020-07-07 RX ORDER — SODIUM CHLORIDE 0.9 % (FLUSH) 0.9 %
10 SYRINGE (ML) INJECTION PRN
Status: DISCONTINUED | OUTPATIENT
Start: 2020-07-07 | End: 2020-07-15 | Stop reason: HOSPADM

## 2020-07-07 RX ORDER — POTASSIUM CHLORIDE 20 MEQ/1
40 TABLET, EXTENDED RELEASE ORAL ONCE
Status: DISCONTINUED | OUTPATIENT
Start: 2020-07-07 | End: 2020-07-07

## 2020-07-07 RX ORDER — SODIUM CHLORIDE 0.9 % (FLUSH) 0.9 %
10 SYRINGE (ML) INJECTION PRN
Status: DISCONTINUED | OUTPATIENT
Start: 2020-07-07 | End: 2020-07-10 | Stop reason: SDUPTHER

## 2020-07-07 RX ORDER — METOCLOPRAMIDE HYDROCHLORIDE 5 MG/ML
10 INJECTION INTRAMUSCULAR; INTRAVENOUS ONCE
Status: COMPLETED | OUTPATIENT
Start: 2020-07-07 | End: 2020-07-07

## 2020-07-07 RX ORDER — ONDANSETRON 2 MG/ML
4 INJECTION INTRAMUSCULAR; INTRAVENOUS EVERY 6 HOURS PRN
Status: DISCONTINUED | OUTPATIENT
Start: 2020-07-07 | End: 2020-07-07

## 2020-07-07 RX ADMIN — PANTOPRAZOLE SODIUM 40 MG: 40 TABLET, DELAYED RELEASE ORAL at 14:21

## 2020-07-07 RX ADMIN — ONDANSETRON 4 MG: 2 INJECTION INTRAMUSCULAR; INTRAVENOUS at 05:53

## 2020-07-07 RX ADMIN — PREDNISONE 5 MG: 5 TABLET ORAL at 14:21

## 2020-07-07 RX ADMIN — Medication 10 ML: at 08:20

## 2020-07-07 RX ADMIN — CETIRIZINE HYDROCHLORIDE 10 MG: 10 TABLET, FILM COATED ORAL at 14:21

## 2020-07-07 RX ADMIN — ENOXAPARIN SODIUM 40 MG: 40 INJECTION SUBCUTANEOUS at 14:24

## 2020-07-07 RX ADMIN — SODIUM CHLORIDE 1000 ML: 9 INJECTION, SOLUTION INTRAVENOUS at 05:51

## 2020-07-07 RX ADMIN — MONTELUKAST SODIUM 10 MG: 10 TABLET, FILM COATED ORAL at 20:03

## 2020-07-07 RX ADMIN — LOPERAMIDE HYDROCHLORIDE 2 MG: 2 CAPSULE ORAL at 09:54

## 2020-07-07 RX ADMIN — TRIMETHOBENZAMIDE HYDROCHLORIDE 200 MG: 100 INJECTION INTRAMUSCULAR at 20:40

## 2020-07-07 RX ADMIN — FAMOTIDINE 20 MG: 10 INJECTION, SOLUTION INTRAVENOUS at 05:54

## 2020-07-07 RX ADMIN — SODIUM CHLORIDE, PRESERVATIVE FREE 10 ML: 5 INJECTION INTRAVENOUS at 20:04

## 2020-07-07 RX ADMIN — SULFASALAZINE 1000 MG: 500 TABLET ORAL at 14:22

## 2020-07-07 RX ADMIN — ACETAMINOPHEN 650 MG: 325 TABLET ORAL at 14:20

## 2020-07-07 RX ADMIN — IOPAMIDOL 90 ML: 755 INJECTION, SOLUTION INTRAVENOUS at 08:20

## 2020-07-07 RX ADMIN — METOPROLOL SUCCINATE 100 MG: 100 TABLET, EXTENDED RELEASE ORAL at 14:21

## 2020-07-07 RX ADMIN — POTASSIUM CHLORIDE 10 MEQ: 7.46 INJECTION, SOLUTION INTRAVENOUS at 21:44

## 2020-07-07 RX ADMIN — TRIMETHOBENZAMIDE HYDROCHLORIDE 200 MG: 100 INJECTION INTRAMUSCULAR at 14:21

## 2020-07-07 RX ADMIN — POTASSIUM CHLORIDE 10 MEQ: 7.46 INJECTION, SOLUTION INTRAVENOUS at 20:41

## 2020-07-07 RX ADMIN — METOCLOPRAMIDE 10 MG: 5 INJECTION, SOLUTION INTRAMUSCULAR; INTRAVENOUS at 07:27

## 2020-07-07 ASSESSMENT — PAIN SCALES - GENERAL: PAINLEVEL_OUTOF10: 3

## 2020-07-07 ASSESSMENT — ENCOUNTER SYMPTOMS
DIARRHEA: 0
SINUS PRESSURE: 0
WHEEZING: 0
EYE PAIN: 0
SHORTNESS OF BREATH: 0
COUGH: 0
EYE DISCHARGE: 0
SORE THROAT: 0
EYE REDNESS: 0
VOMITING: 0
NAUSEA: 1
BACK PAIN: 0
ABDOMINAL DISTENTION: 0

## 2020-07-07 NOTE — CONSULTS
Pulmonary Consultation    Admit Date: 7/7/2020  Requesting Physician: Orlin Ragland DO    Reason for consultation:  · Pneumonia, sleep apnea  HPI:  · Torri Burks is a 79-year-old female known to me from the outpatient setting. There, she was followed for bronchospastic airways disease for some time. More recently, the patient was seen and evaluated for sleep apnea. She underwent a nocturnal polysomnogram evidencing significant disease. To date, she has yet to schedule her titration study noting she wanted to talk to her primary care doctor  about it. · The patient presented to the emergency room now with increasing shortness of breath. Seen and evaluated there, COVID-19 testing was positive. A CT scan showed typical groundglass infiltrates and currently, she is admitted to a stepdown unit. She has been placed on Plaquenil but not on Decadron or Remdesivir as she is not requiring oxygen. PMH:    Past Medical History:   Diagnosis Date    Arthritis     Asthma     Cardiomyopathy ([facility] 2/20/2020    Kidney stone 1/10/2016    LBBB (left bundle branch block) 3/10/2020    Rheumatoid arthritis of multiple sites with negative rheumatoid factor ([facility] [facility] 8/14/2019     PSH:   Past Surgical History:   Procedure Laterality Date    CHOLECYSTECTOMY      CYSTOSCOPY  01/10/2015    uretroscopy,lithotripsy,stent placement    ENDOMETRIAL ABLATION         Review of Systems:   · Constitutional: As noted in the HPI. Denies fever or chills. · Eyes: No visual changes or diplopia. No scleral icterus. · ENT: No headaches, hearing loss or vertigo. No nasal congestion, or sore throat. · Cardiovascular: No chest pain, dyspnea on exertion, or palpitations. · Respiratory: see above  · Gastrointestinal: No abdominal pain, nausea or emesis. No diarrhea or rectal bleeding or melena. No change in bowel habits. · Genitourinary: No dysuria, urinary frequency, or incontinence. No hematuria.   · Musculoskeletal: No gait disturbance, weakness or joint complaints. · Integumentary: No rash or pruritis. No abnormal pigmentation, hair or nail changes. · Neurological: No headache, diplopia, dizziness, tremor, change in muscle strength, numbness or tingling. No change in gait, balance, coordination, mood, affect, memory, mentation, behavior. · Psychiatric: No anxiety or depression. · Endocrine: No temperature intolerance, excessive thirst, fluid intake, urinary frequency, excessive appetite, or recent weight change. · Hematologic/Lymphatic: No abnormal bruising or bleeding, blood clots or swollen lymph nodes. No anemia, fever, chills, night sweats, or swollen glands. · Allergic/Immunologic: No seasonal or perenial allergies. No history of hives or atopic dermatitis. Social History:  · Alcohol:  No  · Tobacco:   No  · Employment:  no silica or asbestos exposure  · Family:  No family history of lung disease    Medications:     cetirizine  10 mg Oral Daily    hydroxychloroquine  400 mg Oral BID    metoprolol succinate  100 mg Oral Daily    montelukast  10 mg Oral Nightly    pantoprazole  40 mg Oral Daily    predniSONE  5 mg Oral Daily    sulfaSALAzine  1,000 mg Oral BID    sodium chloride flush  10 mL Intravenous 2 times per day    enoxaparin  40 mg Subcutaneous Daily       Vitals:  Tmax:  VITALS:  BP (!) 141/57   Pulse 89   Temp 99 °F (37.2 °C) (Oral)   Resp 16   Ht 5' 10\" (1.778 m)   Wt 275 lb (124.7 kg)   SpO2 94%   BMI 39.46 kg/m²   24HR INTAKE/OUTPUT:      Intake/Output Summary (Last 24 hours) at 2020 1728  Last data filed at 2020 1306  Gross per 24 hour   Intake --   Output 150 ml   Net -150 ml     CURRENT PULSE OXIMETRY:  SpO2: 94 %  24HR PULSE OXIMETRY RANGE:  SpO2  Av.3 %  Min: 91 %  Max: 96 %    EXAM:  General: No distress. Resting comfortably. Eyes: PERRL. No sclera icterus. No conjunctival injection. ENT: No discharge. Pharynx clear. Neck: Trachea midline. Normal thyroid.  No jvd, no hjr.   Resp: No wheezing. No accessory muscle use. No rales. No rhonchi. CV: Regular rate. Regular rhythm. No murmur No rub. Abd: Non-tender. Non-distended. No masses. No organmegaly. Normal bowel sounds. Skin: Warm and dry. No nodule on exposed extremities. No rash on exposed extremities. Lymph: No cervical LAD. No supraclavicular LAD. Ext: No joint deformity. No clubbing. No cyanosis. No edema  Neuro: Positive pupils/gag/corneals. Normal pain response. Lab Results:  CBC:   Recent Labs     07/07/20  0543   WBC 4.3*   HGB 14.1   HCT 43.1   MCV 91.9          BMP:  Recent Labs     07/07/20  0543      K 3.3*      CO2 26   BUN 13   CREATININE 0.7    ALB:3,BILIDIR:3,BILITOT:3,ALKPHOS:3)@    PT/INR: No results for input(s): PROTIME, INR in the last 72 hours. Cultures:  Sputum: not available  Blood: not available    ABG:   No results for input(s): PH, PO2, PCO2, HCO3, BE, O2SAT, METHB, O2HB, COHB, O2CON, HHB, THB in the last 72 hours. Films:     CTA PULMONARY W CONTRAST   Final Result   No central pulmonary embolism or aortic dissection. Multifocal patchy and groundglass opacities bilaterally with  moderate   mediastinal and hilar adenopathy and pleural effusion likely   multifocal pneumonia including Covid 19. XR CHEST PORTABLE   Final Result   Mild CHF without edema            . Assessment:  1. COVID-19 infection with pneumonia. At this point, there is no hypoxia therefore no indication for antivirals or Decadron  2. Underlying asthma  3. Seronegative rheumatoid arthritis  4. Sleep apnea: Currently a moot point given that we do not know settings and the patient cannot be on positive airway pressure. Plan:  1. Supportive care for now      Thanks for letting us see this patient in consultation.   Total time in reviewing the previous admissions and records, reviewing the current x-rays, labs, and discussing with clinical staff including nursing and

## 2020-07-07 NOTE — PROGRESS NOTES
Dr. Jaclyn Chong notified of pharmacy request of changing zofran to tigan for . Order change and also ordered to hold plaquenil today. Spoke with pharmacy. If tigan is not sufficient and pt needs an additional medication, compazine should be ordered rather than phenergan.

## 2020-07-07 NOTE — ACP (ADVANCE CARE PLANNING)
Advance Care Planning     Advance Care Planning Activator (Inpatient)  Conversation Note      Date of ACP Conversation: 7/7/2020    Conversation Conducted with: Fina Harrington    ACP Activator: [address] makes decisions on behalf of the incapacitated patient: Decision Maker is asked to consider and make decisions based on patient values, known preferences, or best interests. Health Care Decision Maker:     Current Designated Health Care Decision Maker: Frankie Harkins  (If there is a valid Health Care Decision Maker named in the \"Healthcare Decision Makers\" box in the ACP activity, but it is not visible above, be sure to open that field and then select the health care decision maker relationship (ie \"primary\") in the blank space to the right of the name.) Validate  this information as still accurate & up-to-date; edit [email or web address] field as needed.)    Note: Assess and validate information in current ACP documents, as indicated. If no Decision Maker listed above or available through scanned documents, then:    If no Authorized Decision Maker has previously been identified, then patient chooses [address]:  \"Who would you like to name as your primary health care decision-maker? \"               Name: Adri Chaney        Relationship: spouse         Phone number: [phone]  G[employer] Lover this person be reached easily? \" yes  \"Who would you like to name as your back-up decision maker? \"   Name:Radha Grajeda        Relationship: son          Phone [phone]  \"Can this person be reached easily? \" Yes    Note: If the relationship of these Decision-Makers to the patient does NOT follow your state's Next of Kin hierarchy, recommend that patient complete ACP document that meets state-specific requirements to allow them to act on the patient's behalf when appropriate. Care Preferences    Ventilation:   \"If you were in your present state of health and suddenly became very ill and were unable to breathe on your own, what would your preference be about the use of a ventilator (breathing machine) if it were available to you? \"      Would the patient desire the use of ventilator (breathing machine)?: yes    \"If your health worsens and it becomes clear that your chance of recovery is unlikely, what would your preference be about the use of a ventilator (breathing machine) if it were available to you? \"     Would the patient desire the use of ventilator (breathing machine)?: Yes    Resuscitation  \"CPR works best to restart the heart when there is a sudden event, like a heart attack, in someone who is otherwise healthy. Unfortunately, CPR does not typically restart the heart for people who have serious health conditions or who are very sick. \"    \"In the event your heart stopped as a result of an underlying serious health condition, would you want attempts to be made to restart your heart (answer \"yes\" for attempt to resuscitate) or would you prefer a natural death (answer \"no\" for do not attempt to resuscitate)? \" no      NOTE: If the patient has a valid advance directive AND now provides care preference(s) that are inconsistent with that prior directive, advise the patient to consider either: creating a new advance directive that complies with state-specific requirements; or, if that is not possible, orally revoking that prior directive in accordance with state-specific requirements, which must be documented in the EHR. [] Yes   [x] No   Educated Patient / German Hart regarding differences between Advance Directives and portable DNR orders.     Length of ACP Conversation in minutes:      Conversation Outcomes:  [x] ACP discussion completed  [] Existing advance directive reviewed with patient; no changes to patient's previously recorded wishes  [] New Advance Directive completed  [] Portable Do Not Rescitate prepared for Provider review and signature  [] POLST/POST/MOLST/MOST prepared for Provider review and signature      Follow-up plan:    [] Schedule follow-up conversation to continue planning  [x] Referred individual to Provider for additional questions/concerns   [] Advised patient/agent/surrogate to review completed ACP document and update if needed with changes in condition, patient preferences or care setting    [] This note routed to one or more involved healthcare providers

## 2020-07-07 NOTE — PROGRESS NOTES
Dr. Carissa Elder returned call-updated on patient status and consult.   Electronically signed by Xiang Romo RN on 7/7/2020 at 1:54 PM

## 2020-07-07 NOTE — PROGRESS NOTES
Pt states that she just took the pills given to her around 1430. Delay due to nausea, regardless of tigan, and attempted to eat. Pt vomited pills immediately.

## 2020-07-07 NOTE — ED PROVIDER NOTES
Patient is a 65-year-old female with history of asthma that is presenting after being found covered positive on 1 July. Patient states that she was feeling okay for approximately 5 days however last several days she has had worsening symptoms. Patient states she has had ongoing diarrhea as well as nausea and vomiting. Patient states she has been unable to keep anything down for the last day and a half. Patient has had no oral intake for the last 24 hours. Denies any chest pain, shortness of breath, abdominal pain, change in urinary habits. The history is provided by the patient. Nausea & Vomiting   Severity:  Mild  Duration:  3 days  Timing:  Constant  Progression:  Worsening  Chronicity:  New  Recent urination:  Normal  Relieved by:  Nothing  Ineffective treatments:  Antiemetics  Associated symptoms: no arthralgias, no chills, no cough, no diarrhea, no fever, no headaches and no sore throat         Review of Systems   Constitutional: Negative for chills and fever. HENT: Negative for ear pain, sinus pressure and sore throat. Eyes: Negative for pain, discharge and redness. Respiratory: Negative for cough, shortness of breath and wheezing. Cardiovascular: Negative for chest pain. Gastrointestinal: Positive for nausea. Negative for abdominal distention, diarrhea and vomiting. Genitourinary: Negative for dysuria and frequency. Musculoskeletal: Negative for arthralgias and back pain. Skin: Negative for rash and wound. Neurological: Negative for weakness and headaches. Hematological: Negative for adenopathy. All other systems reviewed and are negative. Physical Exam  Vitals signs and nursing note reviewed. Constitutional:       Appearance: She is well-developed. HENT:      Head: Normocephalic and atraumatic. Eyes:      Pupils: Pupils are equal, round, and reactive to light. Neck:      Musculoskeletal: Normal range of motion and neck supple.    Cardiovascular:      Rate and notes some improvement with zofran. Will follow with reglan and if she has continued improvement with that we can send her home on that since her PO zofran has not worked well enough. [AL]   5157 Noted to be 89% upon ambulation to the bathroom. Will CTA to r/o any potential PE complications from COVID. [AL]   0713 EKG: This EKG is signed and interpreted by me. Rate: 125  Rhythm: Sinus  Interpretation: left bundle branch block  Comparison: stable as compared to patient's most recent EKG      [AL]   0949 Given immodium for diarrhea. [AL]      ED Course User Index  [AL] Lorraine Serna DO      ED Course as of Jul 07 1101   Tue Jul 07, 2020   0705 Patient notes some improvement with zofran. Will follow with reglan and if she has continued improvement with that we can send her home on that since her PO zofran has not worked well enough. [AL]   9591 Noted to be 89% upon ambulation to the bathroom. Will CTA to r/o any potential PE complications from COVID. [AL]   0713 EKG: This EKG is signed and interpreted by me. Rate: 125  Rhythm: Sinus  Interpretation: left bundle branch block  Comparison: stable as compared to patient's most recent EKG      [AL]   0949 Given immodium for diarrhea. [AL]      ED Course User Index  [AL] Lorraine Serna DO       --------------------------------------------- PAST HISTORY ---------------------------------------------  Past Medical History:  has a past medical history of Arthritis, Asthma, Cardiomyopathy ([address] Kidney stone, LBBB (left bundle branch block), and Rheumatoid arthritis of multiple sites with negative rheumatoid factor ([facility] [address] Past Surgical History:  has a past surgical history that includes Cystoscopy (01/10/2015); Cholecystectomy; and Endometrial ablation. Social History:  reports that she has never smoked. She has never used smokeless tobacco. She reports current alcohol use. She reports that she does not use drugs.     Family History: family history is not on file. The patients home medications have been reviewed.     Allergies: Penicillins    -------------------------------------------------- RESULTS -------------------------------------------------    LABS:  Results for orders placed or performed during the hospital encounter of 07/07/20   CBC Auto Differential   Result Value Ref Range    WBC 4.3 (L) 4.5 - 11.5 E9/L    RBC 4.69 3.50 - 5.50 E12/L    Hemoglobin 14.1 11.5 - 15.5 g/dL    Hematocrit 43.1 34.0 - 48.0 %    MCV 91.9 80.0 - 99.9 fL    MCH 30.1 26.0 - 35.0 pg    MCHC 32.7 32.0 - 34.5 %    RDW 12.1 11.5 - 15.0 fL    Platelets 624 861 - 844 E9/L    MPV 9.9 7.0 - 12.0 fL    Neutrophils % 79.8 43.0 - 80.0 %    Immature Granulocytes % 0.2 0.0 - 5.0 %    Lymphocytes % 14.7 (L) 20.0 - 42.0 %    Monocytes % 5.1 2.0 - 12.0 %    Eosinophils % 0.0 0.0 - 6.0 %    Basophils % 0.2 0.0 - 2.0 %    Neutrophils Absolute 3.46 1.80 - 7.30 E9/L    Immature Granulocytes # 0.01 E9/L    Lymphocytes Absolute 0.64 (L) 1.50 - 4.00 E9/L    Monocytes Absolute 0.22 0.10 - 0.95 E9/L    Eosinophils Absolute 0.00 (L) 0.05 - 0.50 E9/L    Basophils Absolute 0.01 0.00 - 0.20 E9/L   Comprehensive Metabolic Panel w/ Reflex to MG   Result Value Ref Range    Sodium 142 132 - 146 mmol/L    Potassium reflex Magnesium 3.3 (L) 3.5 - 5.0 mmol/L    Chloride 100 98 - 107 mmol/L    CO2 26 22 - 29 mmol/L    Anion Gap 16 7 - 16 mmol/L    Glucose 132 (H) 74 - 99 mg/dL    BUN 13 6 - 20 mg/dL    CREATININE 0.7 0.5 - 1.0 mg/dL    GFR Non-African American >60 >=60 mL/min/1.73    GFR African American >60     Calcium 8.6 8.6 - 10.2 mg/dL    Total Protein 7.0 6.4 - 8.3 g/dL    Alb 3.6 3.5 - 5.2 g/dL    Total Bilirubin 0.5 0.0 - 1.2 mg/dL    Alkaline Phosphatase 91 35 - 104 U/L    ALT 54 (H) 0 - 32 U/L    AST 57 (H) 0 - 31 U/L   Troponin   Result Value Ref Range    Troponin <0.01 0.00 - 0.03 ng/mL   Brain Natriuretic Peptide   Result Value Ref Range    Pro-BNP 1,258 (H) 0 - 125 pg/mL   Urinalysis, reflex to microscopic   Result Value Ref Range    Color, UA DARK YELLOW (A) Straw/Yellow    Clarity, UA Clear Clear    Glucose, Ur Negative Negative mg/dL    Bilirubin Urine MODERATE (A) Negative    Ketones, Urine >=80 (A) Negative mg/dL    Specific Gravity, UA >=1.030 1.005 - 1.030    Blood, Urine Negative Negative    pH, UA 6.0 5.0 - 9.0    Protein, UA 30 (A) Negative mg/dL    Urobilinogen, Urine 0.2 <2.0 E.U./dL    Nitrite, Urine Negative Negative    Leukocyte Esterase, Urine TRACE (A) Negative   Lactic Acid, Plasma   Result Value Ref Range    Lactic Acid 1.4 0.5 - 2.2 mmol/L   Magnesium   Result Value Ref Range    Magnesium 1.9 1.6 - 2.6 mg/dL   Microscopic Urinalysis   Result Value Ref Range    WBC, UA 1-3 0 - 5 /HPF    RBC, UA 2-5 0 - 2 /HPF    Bacteria, UA FEW (A) None Seen /HPF   COVID-19   Result Value Ref Range    SARS-CoV-2, NAAT DETECTED (AA) Not Detected   EKG 12 Lead   Result Value Ref Range    Ventricular Rate 125 BPM    Atrial Rate 125 BPM    P-R Interval 120 ms    QRS Duration 164 ms    Q-T Interval 390 ms    QTc Calculation (Bazett) 562 ms    R Axis -13 degrees    T Axis 44 degrees       RADIOLOGY:  CTA PULMONARY W CONTRAST   Final Result   No central pulmonary embolism or aortic dissection. Multifocal patchy and groundglass opacities bilaterally with  moderate   mediastinal and hilar adenopathy and pleural effusion likely   multifocal pneumonia including Covid 19. XR CHEST PORTABLE   Final Result   Mild CHF without edema                ------------------------- NURSING NOTES AND VITALS REVIEWED ---------------------------  Date / Time Roomed:  7/7/2020  5:30 AM  ED Bed Assignment:  03/03    The nursing notes within the ED encounter and vital signs as below have been reviewed.      Patient Vitals for the past 24 hrs:   BP Temp Temp src Pulse Resp SpO2 Height Weight   07/07/20 0721 114/62 98.8 °F (37.1 °C) Oral 116 16 91 % -- --   07/07/20 0646 138/89 99 °F (37.2 °C) Oral 122 16 93 % -- -- 07/07/20 0536 (!) 141/85 98.5 °F (36.9 °C) Oral 127 16 92 % 5' 10\" (1.778 m) 275 lb (124.7 kg)   07/07/20 0529 -- 99.3 °F (37.4 °C) Temporal 138 -- 94 % -- --       Oxygen Saturation Interpretation: 89% on ambulation; 92% resting. Counseling:  I have spoken with the patient and discussed todays results, in addition to providing specific details for the plan of care and counseling regarding the diagnosis and prognosis. Their questions are answered at this time and they are agreeable with the plan of admission.    --------------------------------- ADDITIONAL PROVIDER NOTES ---------------------------------  Consultations:  Time: 1100. Spoke with Dr. Tello Bowden. Discussed case. They will admit the patient. This patient's ED course included: a personal history and physicial examination, re-evaluation prior to disposition, multiple bedside re-evaluations, IV medications, cardiac monitoring and continuous pulse oximetry    This patient has remained hemodynamically stable during their ED course. Diagnosis:  1. COVID-19    2. Non-intractable vomiting with nausea, unspecified vomiting type        Disposition:  Patient's disposition: Admit to telemetry  Patient's condition is good.               Magen Hughes DO  Resident  07/07/20 5996

## 2020-07-07 NOTE — PROGRESS NOTES
Pt arrived to unit and placed in droplet+ precautions per Covid protocol. She is A&Ox4. Pt made aware of  limiting entrances to room by rounding via phonecalls and window communication and clumping care. Pt oriented to room and instructed how to use phone and call light. She is independent and low fall risk. Pt refused wearing fall socks. Telemetry was initiated. Call light and phone are within reach. She declines any needs at this time. Will continue to monitor and visit patient again when admission orders are placed. Dr. Sherice Moeller did see patient.

## 2020-07-07 NOTE — H&P
[place] [address] [facility]   History and Physical      CHIEF COMPLAINT:  vomiting    History of Present Illness: pt is a 63 y.o. female who presents with multiple complaints which includes vomiting. Pt was on vacation the prior. Pt had noticed approx 9 days ago that she had scratchy throat and ear pain. Pt noted that her son had developed a fever as well on vacationing with them. Pt noted that she had some sob. Pt had been tested approx 1 week ago and was positive for covid19. Pt states on the weekend that she developed diarrhea. Pt noted she had n/v over last 2 days. Pt was becoming dizzy. Because she was feeling worse, pt came to ER for further eval and tx. Pt denies cp, abd pain, constipation, melena, hematochezia, change in urination, dysuria, or hematuria. REVIEW OF SYSTEMS:    A detailed system review was conducted with patient and is negative unless stated in hpi. PMH:  Past Medical History:   Diagnosis Date    Arthritis     Asthma     Cardiomyopathy ([facility] [address] 2/20/2020    Kidney stone 1/10/2016    LBBB (left bundle branch block) 3/10/2020    Rheumatoid arthritis of multiple sites with negative rheumatoid factor ([facility] [address] 8/14/2019       Surgical History:  Past Surgical History:   Procedure Laterality Date    CHOLECYSTECTOMY      CYSTOSCOPY  01/10/2015    uretroscopy,lithotripsy,stent placement    ENDOMETRIAL ABLATION         Medications Prior to Admission:    Prior to Admission medications    Medication Sig Start Date End Date Taking?  Authorizing Provider   albuterol (PROVENTIL) (2.5 MG/3ML) 0.083% nebulizer solution Take 2.5 mg by nebulization every 6 hours as needed for Wheezing   Yes Historical Provider, MD   sulfaSALAzine (AZULFIDINE) 500 MG tablet Take 2 tablets by mouth 2 times daily 6/15/20  Yes Kelvin Auguste DO   potassium chloride (KLOR-CON M) 20 MEQ extended release tablet Take 2 tablets by mouth daily (with breakfast) 6/15/20  Yes Kelvin Auguste DO fluticasone-vilanterol (BREO ELLIPTA) 100-25 MCG/INH AEPB inhaler Inhale 1 puff into the lungs daily 5/28/20  Yes Kristina Hedrick MD   metoprolol succinate (TOPROL XL) 100 MG extended release tablet Take 1 tablet by mouth daily 5/4/20  [facility]   valsartan (DIOVAN) 80 MG tablet Take 1 tablet by mouth 2 times daily 4/20/20  Yes Angel Meza MD   predniSONE (DELTASONE) 5 MG tablet Take 1 tablet by mouth daily 3/23/20  Yes [facility]   furosemide (LASIX) 40 MG tablet Take 1 tablet by mouth daily 2/24/20  Yes Meghann Olmstead MD   hydroxychloroquine (PLAQUENIL) 200 MG tablet Take 2 tablets by mouth 2 times daily 2/10/20  Yes [facility]   pantoprazole (PROTONIX) 40 MG tablet Take 1 tablet by mouth daily 1/27/20  Yes [facility]   montelukast (SINGULAIR) 10 MG tablet Take 1 tablet by mouth nightly 1/27/20  Yes [facility]   diclofenac sodium 1 % GEL Apply 4 g topically 4 times daily  Patient taking differently: Apply 4 g topically 4 times daily as needed  12/4/19  Yes Joe Interiano DPM   Adalimumab 40 MG/0.4ML PNKT Inject 40 mg Sub Q every other week - citrate free  Patient taking differently: Inject 40 mg Sub Q every other week - citrate free  Has not taken since catching flu B in Feb 8/22/19  Yes Temitope Russo MD   Cholecalciferol (VITAMIN D) 2000 units CAPS capsule Take by mouth   Yes Historical Provider, MD   cetirizine (ZYRTEC) 10 MG tablet Take 10 mg by mouth daily   Yes Historical Provider, MD   albuterol sulfate  (90 Base) MCG/ACT inhaler Inhale 2 puffs into the lungs 4 times daily as needed for Wheezing 3/28/19   Fiorella Shepard DO       Allergies:    Penicillins    Social History:    reports that she has never smoked. She has never used smokeless tobacco. She reports current alcohol use. She reports that she does not use drugs.     Family History:       Problem Relation Age of Onset    Rheum Arthritis Mother     Heart Failure Mother     Colon Cancer Father              PHYSICAL EXAM:    Vitals:  BP (!) 141/57   Pulse 89   Temp 99 °F (37.2 °C) (Oral)   Resp 16   Ht 5' 10\" (1.778 m)   Wt 275 lb (124.7 kg)   SpO2 94%   BMI 39.46 kg/m²     General:  Appears comfortable. Answers questions appropriately and cooperative with exam  HEENT:  Mucous membranes moist. No erythema, rhinorrhea, or post-nasal drip noted. Neck:  No carotid bruits. Heart:  Rhythm regular at rate of 90  Lungs:  CTA. No wheeze, rales, or rhonchi  Abdomen:  Positive bowel sounds positive. Soft. Non-tender. No guarding, rebound or rigidity. Breast/Rectal/Genitourinary: not pertinent. Extremities:  Negative for lower extremity edema  Skin:  Warm and dry  Vascular: 2/4 Dorsalis Pedis pulses bilaterally. Neuro:  Cranial nerves 2-12 grossly intact, no focal weakness or change in sensation noted. Extraocular muscles intact. Pupils equal, round, reactive to light. LABS:  Recent Labs     07/07/20  0543      K 3.3*      CO2 26   BUN 13   CREATININE 0.7   GLUCOSE 132*   CALCIUM 8.6       Recent Labs     07/07/20  0543   WBC 4.3*   RBC 4.69   HGB 14.1   HCT 43.1   MCV 91.9   MCH 30.1   MCHC 32.7   RDW 12.1      MPV 9.9       No results for input(s): POCGLU in the last 72 hours.     CBC with Differential:    Lab Results   Component Value Date    WBC 4.3 07/07/2020    RBC 4.69 07/07/2020    HGB 14.1 07/07/2020    HCT 43.1 07/07/2020     07/07/2020    MCV 91.9 07/07/2020    MCH 30.1 07/07/2020    MCHC 32.7 07/07/2020    RDW 12.1 07/07/2020    METASPCT 2 01/10/2016    LYMPHOPCT 14.7 07/07/2020    MONOPCT 5.1 07/07/2020    BASOPCT 0.2 07/07/2020    MONOSABS 0.22 07/07/2020    LYMPHSABS 0.64 07/07/2020    EOSABS 0.00 07/07/2020    BASOSABS 0.01 07/07/2020     CMP:    Lab Results   Component Value Date     07/07/2020    K 3.3 07/07/2020     07/07/2020    CO2 26 07/07/2020    BUN 13 07/07/2020    CREATININE 0.7 07/07/2020    GFRAA >60 07/07/2020 LABGLOM >60 2020    GLUCOSE 132 2020    PROT 7.0 2020    LABALBU 3.6 2020    CALCIUM 8.6 2020    BILITOT 0.5 2020    ALKPHOS 91 2020    AST 57 2020    ALT 54 2020     Magnesium:    Lab Results   Component Value Date    MG 1.9 2020     Troponin:    Lab Results   Component Value Date    TROPONINI <0.01 2020     U/A:    Lab Results   Component Value Date    NITRITE neg 2018    COLORU DARK YELLOW 2020    PROTEINU 30 2020    PHUR 6.0 2020    LABCAST FEW 01/10/2016    WBCUA 1-3 2020    RBCUA 2-5 2020    MUCUS Present 2019    BACTERIA FEW 2020    CLARITYU Clear 2020    SPECGRAV >=1.030 2020    LEUKOCYTESUR TRACE 2020    UROBILINOGEN 0.2 2020    BILIRUBINUR MODERATE 2020    BILIRUBINUR neg 2018    BLOODU Negative 2020    GLUCOSEU Negative 2020       Radiology: Xr Chest Portable    Result Date: 2020  Patient MRN:  [id number] : 1965 Age: 63 years Gender: Female Order Date:  2020 5:45 AM EXAM: XR CHEST PORTABLE one image INDICATION:  shortness of breath shortness of breath COMPARISON: 2020 FINDINGS: There is mild  cardiomegaly. There is vascular congestion with the perihilar and bibasilar atelectasis. Tiny left pleural effusion is suspected. There is no focal consolidation. Mild CHF without edema     Cta Pulmonary W Contrast    Result Date: 2020  Patient MRN:  [id number] : 1965 Age: 63 years Gender: Female Order Date:  2020 7:15 AM EXAM: CTA PULMONARY W CONTRAST number of images 500 including MIP coronal and sagittal reconstruction images. Contrast. Isovue-370, 90 mL intravenously. Technique: Low-dose CT  acquisition technique included one of following options; 1 . Automated exposure control, 2. Adjustment of MA and or KV according to patient's size or 3. Use of iterative reconstruction.  INDICATION:  Hypoxia/COVID Hypoxia/COVID COMPARISON: None FINDINGS: The heart and the great vessels are normal. Small to moderate lymph nodes measuring up to 1.1 cm are identified in the mediastinum and pulmonary hilum. There are no filling defects in the main pulmonary artery and the central branches. There are patchy and multifocal groundglass opacities in the upper and lower lobes bilaterally with tiny pleural effusions. The liver is fatty infiltrated. Gallbladder is absent. 1.3 cm left renal cyst is present. No central pulmonary embolism or aortic dissection. Multifocal patchy and groundglass opacities bilaterally with  moderate mediastinal and hilar adenopathy and pleural effusion likely multifocal pneumonia including Covid 19. ASSESSMENT:      Active Problems:    COVID-19  Resolved Problems:    * No resolved hospital problems. *      PLAN:    1. Pneumonia due to covid 19 monitor oxygen level. It was noted to be in low 90's with drop in sat to 89% with ambulation per report given to me by ER. Pulmonary consulted. 2. Dehydration from n/v/diarrhea. Judicious use of iv fluids. Pt given bolus of iv fluids. Encourage po. Monitor of iv fluids and reassess tomorrow  3. Hypokalemia monitor and tx  4. RA on plaquenil and sulfasalazine and steroid will continue for now. Plaquenil on hold at moment with elevated OTc  5. Asthma monitor closely.  Pulmonary consulted            Electronically signed by Teofilo Mesa DO on 7/7/2020 at 5:55 PM

## 2020-07-08 LAB
ALBUMIN SERPL-MCNC: 3.6 G/DL (ref 3.5–5.2)
ALP BLD-CCNC: 82 U/L (ref 35–104)
ALT SERPL-CCNC: 48 U/L (ref 0–32)
ANION GAP SERPL CALCULATED.3IONS-SCNC: 15 MMOL/L (ref 7–16)
AST SERPL-CCNC: 47 U/L (ref 0–31)
BACTERIA: ABNORMAL /HPF
BASOPHILS ABSOLUTE: 0.01 E9/L (ref 0–0.2)
BASOPHILS RELATIVE PERCENT: 0.2 % (ref 0–2)
BILIRUB SERPL-MCNC: 0.5 MG/DL (ref 0–1.2)
BILIRUBIN URINE: ABNORMAL
BLOOD, URINE: NEGATIVE
BUN BLDV-MCNC: 14 MG/DL (ref 6–20)
C-REACTIVE PROTEIN: 10.3 MG/DL (ref 0–0.4)
CALCIUM SERPL-MCNC: 8.4 MG/DL (ref 8.6–10.2)
CHLORIDE BLD-SCNC: 98 MMOL/L (ref 98–107)
CLARITY: ABNORMAL
CO2: 26 MMOL/L (ref 22–29)
COLOR: YELLOW
CREAT SERPL-MCNC: 0.5 MG/DL (ref 0.5–1)
D DIMER: 263 NG/ML DDU
EOSINOPHILS ABSOLUTE: 0 E9/L (ref 0.05–0.5)
EOSINOPHILS RELATIVE PERCENT: 0 % (ref 0–6)
EPITHELIAL CELLS, UA: ABNORMAL /HPF
FERRITIN: 261 NG/ML
GFR AFRICAN AMERICAN: >60
GFR NON-AFRICAN AMERICAN: >60 ML/MIN/1.73
GLUCOSE BLD-MCNC: 92 MG/DL (ref 74–99)
GLUCOSE URINE: NEGATIVE MG/DL
HCT VFR BLD CALC: 40.2 % (ref 34–48)
HEMOGLOBIN: 13.4 G/DL (ref 11.5–15.5)
IMMATURE GRANULOCYTES #: 0.02 E9/L
IMMATURE GRANULOCYTES %: 0.4 % (ref 0–5)
KETONES, URINE: >=80 MG/DL
LACTATE DEHYDROGENASE: 327 U/L (ref 135–214)
LEUKOCYTE ESTERASE, URINE: ABNORMAL
LYMPHOCYTES ABSOLUTE: 0.73 E9/L (ref 1.5–4)
LYMPHOCYTES RELATIVE PERCENT: 15.1 % (ref 20–42)
MAGNESIUM: 1.9 MG/DL (ref 1.6–2.6)
MCH RBC QN AUTO: 30.5 PG (ref 26–35)
MCHC RBC AUTO-ENTMCNC: 33.3 % (ref 32–34.5)
MCV RBC AUTO: 91.4 FL (ref 80–99.9)
MONOCYTES ABSOLUTE: 0.31 E9/L (ref 0.1–0.95)
MONOCYTES RELATIVE PERCENT: 6.4 % (ref 2–12)
NEUTROPHILS ABSOLUTE: 3.78 E9/L (ref 1.8–7.3)
NEUTROPHILS RELATIVE PERCENT: 77.9 % (ref 43–80)
NITRITE, URINE: NEGATIVE
PDW BLD-RTO: 11.9 FL (ref 11.5–15)
PH UA: 6 (ref 5–9)
PHOSPHORUS: 2.1 MG/DL (ref 2.5–4.5)
PLATELET # BLD: 204 E9/L (ref 130–450)
PMV BLD AUTO: 9.9 FL (ref 7–12)
POTASSIUM SERPL-SCNC: 3.4 MMOL/L (ref 3.5–5)
PROTEIN UA: 30 MG/DL
RBC # BLD: 4.4 E12/L (ref 3.5–5.5)
RBC UA: ABNORMAL /HPF (ref 0–2)
SEDIMENTATION RATE, ERYTHROCYTE: 41 MM/HR (ref 0–20)
SODIUM BLD-SCNC: 139 MMOL/L (ref 132–146)
SPECIFIC GRAVITY UA: >=1.03 (ref 1–1.03)
TOTAL PROTEIN: 6.6 G/DL (ref 6.4–8.3)
UROBILINOGEN, URINE: 1 E.U./DL
WBC # BLD: 4.9 E9/L (ref 4.5–11.5)
WBC UA: ABNORMAL /HPF (ref 0–5)

## 2020-07-08 PROCEDURE — 2580000003 HC RX 258: Performed by: INTERNAL MEDICINE

## 2020-07-08 PROCEDURE — 85651 RBC SED RATE NONAUTOMATED: CPT

## 2020-07-08 PROCEDURE — 82728 ASSAY OF FERRITIN: CPT

## 2020-07-08 PROCEDURE — 2060000000 HC ICU INTERMEDIATE R&B

## 2020-07-08 PROCEDURE — 83520 IMMUNOASSAY QUANT NOS NONAB: CPT

## 2020-07-08 PROCEDURE — 6370000000 HC RX 637 (ALT 250 FOR IP): Performed by: INTERNAL MEDICINE

## 2020-07-08 PROCEDURE — 2500000003 HC RX 250 WO HCPCS: Performed by: INTERNAL MEDICINE

## 2020-07-08 PROCEDURE — 6360000002 HC RX W HCPCS: Performed by: INTERNAL MEDICINE

## 2020-07-08 PROCEDURE — 83615 LACTATE (LD) (LDH) ENZYME: CPT

## 2020-07-08 PROCEDURE — 80053 COMPREHEN METABOLIC PANEL: CPT

## 2020-07-08 PROCEDURE — 86140 C-REACTIVE PROTEIN: CPT

## 2020-07-08 PROCEDURE — 85378 FIBRIN DEGRADE SEMIQUANT: CPT

## 2020-07-08 PROCEDURE — 99232 SBSQ HOSP IP/OBS MODERATE 35: CPT | Performed by: INTERNAL MEDICINE

## 2020-07-08 PROCEDURE — 85025 COMPLETE CBC W/AUTO DIFF WBC: CPT

## 2020-07-08 PROCEDURE — 81001 URINALYSIS AUTO W/SCOPE: CPT

## 2020-07-08 PROCEDURE — 84100 ASSAY OF PHOSPHORUS: CPT

## 2020-07-08 PROCEDURE — 36415 COLL VENOUS BLD VENIPUNCTURE: CPT

## 2020-07-08 PROCEDURE — 83735 ASSAY OF MAGNESIUM: CPT

## 2020-07-08 RX ORDER — SODIUM CHLORIDE 9 MG/ML
INJECTION, SOLUTION INTRAVENOUS ONCE
Status: COMPLETED | OUTPATIENT
Start: 2020-07-08 | End: 2020-07-08

## 2020-07-08 RX ORDER — SODIUM CHLORIDE 9 MG/ML
INJECTION, SOLUTION INTRAVENOUS CONTINUOUS
Status: DISCONTINUED | OUTPATIENT
Start: 2020-07-08 | End: 2020-07-14

## 2020-07-08 RX ADMIN — SODIUM CHLORIDE, PRESERVATIVE FREE 10 ML: 5 INJECTION INTRAVENOUS at 11:03

## 2020-07-08 RX ADMIN — POTASSIUM PHOSPHATE, MONOBASIC AND POTASSIUM PHOSPHATE, DIBASIC 10 MMOL: 224; 236 INJECTION, SOLUTION, CONCENTRATE INTRAVENOUS at 18:44

## 2020-07-08 RX ADMIN — TRIMETHOBENZAMIDE HYDROCHLORIDE 200 MG: 100 INJECTION INTRAMUSCULAR at 08:21

## 2020-07-08 RX ADMIN — SODIUM CHLORIDE: 9 INJECTION, SOLUTION INTRAVENOUS at 21:38

## 2020-07-08 RX ADMIN — TRIMETHOBENZAMIDE HYDROCHLORIDE 200 MG: 100 INJECTION INTRAMUSCULAR at 21:38

## 2020-07-08 RX ADMIN — SODIUM CHLORIDE: 9 INJECTION, SOLUTION INTRAVENOUS at 11:02

## 2020-07-08 RX ADMIN — ACETAMINOPHEN 650 MG: 325 TABLET ORAL at 01:50

## 2020-07-08 ASSESSMENT — PAIN SCALES - GENERAL
PAINLEVEL_OUTOF10: 0
PAINLEVEL_OUTOF10: 0

## 2020-07-08 NOTE — CARE COORDINATION
CM spoke with pt by phone to discuss role, anticipated LOS & current plan of care. Reports living with her  & children (covid positive also) in 2 story home. Is independent & does not use equipment to get around. Does not use O2 at home & is not diabetic. No hx K[facility]I but did have [address] many many years ago---unsure of agency name. Discussed dx, current medications & TX plan. States she plans to return home with family when ready for discharge. Declines need for HHC. CM & SW continue to follow. OLIVIA met with nurse for update---starting on IV remdesivir & IV decadron.

## 2020-07-08 NOTE — PROGRESS NOTES
Gracy Flores Hospitalist   Progress Note    Admitting Date and Time: 7/7/2020  5:30 AM  Admit Dx: [id number]-13 [U07.1]  COVID-19 [U07.1]    Subjective:    Patient was admitted with COVID-19 [U07.1]  COVID-19 [U07.1]. Patient denies fever, chills.  Pt c/o nausea     potassium phosphate IVPB  10 mmol Intravenous Once    cetirizine  10 mg Oral Daily    hydroxychloroquine  400 mg Oral BID    metoprolol succinate  100 mg Oral Daily    montelukast  10 mg Oral Nightly    pantoprazole  40 mg Oral Daily    predniSONE  5 mg Oral Daily    sulfaSALAzine  1,000 mg Oral BID    sodium chloride flush  10 mL Intravenous 2 times per day    enoxaparin  40 mg Subcutaneous Daily     sodium chloride flush, 10 mL, PRN  sodium chloride flush, 10 mL, PRN  acetaminophen, 650 mg, Q6H PRN    Or  acetaminophen, 650 mg, Q6H PRN  polyethylene glycol, 17 g, Daily PRN  trimethobenzamide, 200 mg, Q6H PRN         Objective:    BP (!) 151/78   Pulse 87   Temp 97.5 °F (36.4 °C) (Infrared)   Resp 18   Ht 5' 10\" (1.778 m)   Wt 270 lb 12.8 oz (122.8 kg)   SpO2 93%   BMI 38.86 kg/m²   Skin: warm and dry, no rash or erythema  Pulmonary/Chest: clear to auscultation bilaterally- no wheezes, rales or rhonchi, normal air movement, no respiratory distress  Cardiovascular: rhythm reg at rate of 88  Abdomen: soft, non-tender, non-distended, normal bowel sounds, no masses or organomegaly  Extremities: no cyanosis, no clubbing and no edema      Recent Labs     07/07/20  0543 07/08/20  0205    139   K 3.3* 3.4*    98   CO2 26 26   BUN 13 14   CREATININE 0.7 0.5   GLUCOSE 132* 92   CALCIUM 8.6 8.4*       Recent Labs     07/07/20  0543 07/08/20  0205   WBC 4.3* 4.9   RBC 4.69 4.40   HGB 14.1 13.4   HCT 43.1 40.2   MCV 91.9 91.4   MCH 30.1 30.5   MCHC 32.7 33.3   RDW 12.1 11.9    204   MPV 9.9 9.9       CBC with Differential:    Lab Results   Component Value Date    WBC 4.9 07/08/2020    RBC 4.40 07/08/2020    HGB 13.4 07/08/2020    HCT 40.2 07/08/2020     07/08/2020    MCV 91.4 07/08/2020    MCH 30.5 07/08/2020    MCHC 33.3 07/08/2020    RDW 11.9 07/08/2020    METASPCT 2 01/10/2016    LYMPHOPCT 15.1 07/08/2020    MONOPCT 6.4 07/08/2020    BASOPCT 0.2 07/08/2020    MONOSABS 0.31 07/08/2020    LYMPHSABS 0.73 07/08/2020    EOSABS 0.00 07/08/2020    BASOSABS 0.01 07/08/2020     CMP:    Lab Results   Component Value Date     07/08/2020    K 3.4 07/08/2020    K 3.3 07/07/2020    CL 98 07/08/2020    CO2 26 07/08/2020    BUN 14 07/08/2020    CREATININE 0.5 07/08/2020    GFRAA >60 07/08/2020    LABGLOM >60 07/08/2020    GLUCOSE 92 07/08/2020    PROT 6.6 07/08/2020    LABALBU 3.6 07/08/2020    CALCIUM 8.4 07/08/2020    BILITOT 0.5 07/08/2020    ALKPHOS 82 07/08/2020    AST 47 07/08/2020    ALT 48 07/08/2020     Magnesium:    Lab Results   Component Value Date    MG 1.9 07/08/2020     Phosphorus:    Lab Results   Component Value Date    PHOS 2.1 07/08/2020     U/A:    Lab Results   Component Value Date    NITRITE neg 11/12/2018    COLORU Yellow 07/08/2020    PROTEINU 30 07/08/2020    PHUR 6.0 07/08/2020    LABCAST FEW 01/10/2016    WBCUA 1-3 07/08/2020    RBCUA NONE 07/08/2020    MUCUS Present 04/14/2019    BACTERIA FEW 07/08/2020    CLARITYU SL CLOUDY 07/08/2020    SPECGRAV >=1.030 07/08/2020    LEUKOCYTESUR TRACE 07/08/2020    UROBILINOGEN 1.0 07/08/2020    BILIRUBINUR MODERATE 07/08/2020    BILIRUBINUR neg 11/12/2018    BLOODU Negative 07/08/2020    GLUCOSEU Negative 07/08/2020        Radiology:   CTA PULMONARY W CONTRAST   Final Result   No central pulmonary embolism or aortic dissection. Multifocal patchy and groundglass opacities bilaterally with  moderate   mediastinal and hilar adenopathy and pleural effusion likely   multifocal pneumonia including Covid 19.             XR CHEST PORTABLE   Final Result   Mild CHF without edema                Assessment:    Active Problems:    COVID-19    Pneumonia due to COVID-19 virus    Dehydration    Hypokalemia    Rheumatoid arthritis ([facility] [address]  Resolved Problems:    * No resolved hospital problems. *      Plan:  1. Pneumonia due to covid 19 monitor oxygen level. Pulmonary following and recommend remdesivir. ID consulted for eval for remdesivir. 2. Dehydration from n/v/diarrhea. Judicious use of iv fluids. Pt given bolus of iv fluids. Encourage po. Pt tolerating iv fluids. Will give continuous iv fluids but will need to reassess tomorrow for further iv fluids  3. Hypokalemia monitor and replace prn  4. Hypophosphatemia monitor and replace prn  5. RA on plaquenil and sulfasalazine and steroid will continue for now. Plaquenil on hold at moment with elevated OTc  6. Asthma monitor closely.  Pulmonary following        Electronically signed by Delvin Sandifer, DO on 7/8/2020 at 6:11 PM

## 2020-07-08 NOTE — CONSULTS
[address] [place] [address] [facility]  Consultation Note     Admit Date: 7/7/2020  5:30 AM    Reason for Consult:   Remdesivir in [facility] + patient    Attending Physician:  Kayden Sandoval DO    HISTORY OF PRESENT ILLNESS:             The history is obtained from extensive review of available past medical records. The patient is a 63 y.o. female who lives at home with her  and 80-year-old son. Both of them have been bar hopping. She came down with low-grade fevers, arthralgias/myalgias, headache, nausea and vomiting approximately 1 week prior to the admission. She tested positive for SARS-CoV-2 on 7/1/2020. Her major complaint was nausea and vomiting and she only had one episode of loose stools. She came to the hospital on 7/7/2020 and was admitted. She has a history of rheumatoid arthritis and takes Plaquenil on a daily basis. She was seen in consultation by pulmonary. She is not yet required oxygen and pulse oximetry has been above 90% on room air. ID was asked to see her in consultation.     Past Medical History:        Diagnosis Date    Arthritis     Asthma     Cardiomyopathy ([facility] [address] 2/20/2020    Kidney stone 1/10/2016    LBBB (left bundle branch block) 3/10/2020    Rheumatoid arthritis of multiple sites with negative rheumatoid factor ([facility] [address] 8/14/2019     Past Surgical History:        Procedure Laterality Date    CHOLECYSTECTOMY      CYSTOSCOPY  01/10/2015    uretroscopy,lithotripsy,stent placement    ENDOMETRIAL ABLATION       Current Medications:   Scheduled Meds:   cetirizine  10 mg Oral Daily    hydroxychloroquine  400 mg Oral BID    metoprolol succinate  100 mg Oral Daily    montelukast  10 mg Oral Nightly    pantoprazole  40 mg Oral Daily    predniSONE  5 mg Oral Daily    sulfaSALAzine  1,000 mg Oral BID    sodium chloride flush  10 mL Intravenous 2 times per day    enoxaparin  40 mg Subcutaneous Daily     Continuous Infusions:  PRN Meds:sodium chloride flush,

## 2020-07-08 NOTE — PROGRESS NOTES
Pulmonary Progress Note    Admit Date: 2020  Hospital day                               PCP: Radha Mackey DO    Chief Complaint (s):  Patient Active Problem List   Diagnosis    Arthritis    Asthma    Acute cystitis without hematuria    Viral URI    Bronchitis    Rheumatoid arthritis of multiple sites with negative rheumatoid factor ([facility] [address]    Tachycardia    Cardiomyopathy ([facility] [address]    LBBB (left bundle branch block)    COVID-19    Pneumonia due to COVID-19 virus    Dehydration    Hypokalemia    Rheumatoid arthritis ([facility] [address]       Subjective:  · Sleeping soundly this a.m. Pulse oximetry is gradually trended downward. Lab work evidences ongoing lymphopenia. Vitals:  VITALS:  BP (!) 122/56   Pulse 81   Temp 97.2 °F (36.2 °C) (Infrared)   Resp 18   Ht 5' 10\" (1.778 m)   Wt 270 lb 12.8 oz (122.8 kg)   SpO2 91%   BMI 38.86 kg/m²     24HR INTAKE/OUTPUT:      Intake/Output Summary (Last 24 hours) at 2020 1252  Last data filed at 2020 0820  Gross per 24 hour   Intake 460.04 ml   Output 750 ml   Net -289.96 ml       24HR PULSE OXIMETRY RANGE:    SpO2  Av.5 %  Min: 91 %  Max: 94 %    Medications:  IV:      Scheduled Meds:   cetirizine  10 mg Oral Daily    hydroxychloroquine  400 mg Oral BID    metoprolol succinate  100 mg Oral Daily    montelukast  10 mg Oral Nightly    pantoprazole  40 mg Oral Daily    predniSONE  5 mg Oral Daily    sulfaSALAzine  1,000 mg Oral BID    sodium chloride flush  10 mL Intravenous 2 times per day    enoxaparin  40 mg Subcutaneous Daily       Diet:   DIET GENERAL;     EXAM:  General: No distress. Asleep. Eyes: PERRL. No sclera icterus. No conjunctival injection. ENT: No discharge. Pharynx clear. Neck: Trachea midline. Normal thyroid. Resp: No accessory muscle use. No rales. No wheezing. No rhonchi. CV: Regular rate. Regular rhythm. No murmur or rub. Abd: Non-tender. Non-distended. No masses. No organomegaly. Normal bowel sounds. Skin: Warm and dry. No nodule on exposed extremities. No rash on exposed extremities. Ext: No cyanosis, clubbing, edema  Lymph: No cervical LAD. No supraclavicular LAD. M/S: No cyanosis. No joint deformity. No clubbing. Neuro: Awake. Follows commands. Positive pupils/gag/corneals. Normal pain response. Results:  CBC:   Recent Labs     07/07/20  0543 07/08/20  0205   WBC 4.3* 4.9   HGB 14.1 13.4   HCT 43.1 40.2   MCV 91.9 91.4    204     BMP:   Recent Labs     07/07/20  0543 07/08/20  0205    139   K 3.3* 3.4*    98   CO2 26 26   PHOS  --  2.1*   BUN 13 14   CREATININE 0.7 0.5     LIVER PROFILE:   Recent Labs     07/07/20  0543 07/08/20  0205   AST 57* 47*   ALT 54* 48*   BILITOT 0.5 0.5   ALKPHOS 91 82     PT/INR: No results for input(s): PROTIME, INR in the last 72 hours. APTT: No results for input(s): APTT in the last 72 hours. Pathology:  1. N/A      Microbiology:  1. None    Recent ABG:   No results for input(s): PH, PO2, PCO2, HCO3, BE, O2SAT, METHB, O2HB, COHB, O2CON, HHB, THB in the last 72 hours. Recent Films:  CTA PULMONARY W CONTRAST   Final Result   No central pulmonary embolism or aortic dissection. Multifocal patchy and groundglass opacities bilaterally with  moderate   mediastinal and hilar adenopathy and pleural effusion likely   multifocal pneumonia including Covid 19. XR CHEST PORTABLE   Final Result   Mild CHF without edema                         Assessment:  1. COVID-19 infection with pneumonia. At this point, there is no hypoxia therefore no indication for antivirals or Decadron  2. Underlying asthma  3. Seronegative rheumatoid arthritis  4. Sleep apnea: Currently a moot point given that we do not know settings and the patient cannot be on positive airway pressure.        Plan:  1. Supportive care for now.   The patient's SPO2 slipped below 89% and she require oxygen, a combination of Decadron and Remsesivir are

## 2020-07-09 LAB
ALBUMIN SERPL-MCNC: 3.6 G/DL (ref 3.5–5.2)
ALP BLD-CCNC: 78 U/L (ref 35–104)
ALT SERPL-CCNC: 47 U/L (ref 0–32)
ANION GAP SERPL CALCULATED.3IONS-SCNC: 15 MMOL/L (ref 7–16)
AST SERPL-CCNC: 43 U/L (ref 0–31)
BASOPHILS ABSOLUTE: 0.02 E9/L (ref 0–0.2)
BASOPHILS RELATIVE PERCENT: 0.3 % (ref 0–2)
BILIRUB SERPL-MCNC: 0.6 MG/DL (ref 0–1.2)
BUN BLDV-MCNC: 17 MG/DL (ref 6–20)
CALCIUM SERPL-MCNC: 8.5 MG/DL (ref 8.6–10.2)
CHLORIDE BLD-SCNC: 102 MMOL/L (ref 98–107)
CO2: 25 MMOL/L (ref 22–29)
CREAT SERPL-MCNC: 0.5 MG/DL (ref 0.5–1)
EOSINOPHILS ABSOLUTE: 0 E9/L (ref 0.05–0.5)
EOSINOPHILS RELATIVE PERCENT: 0 % (ref 0–6)
GFR AFRICAN AMERICAN: >60
GFR NON-AFRICAN AMERICAN: >60 ML/MIN/1.73
GLUCOSE BLD-MCNC: 112 MG/DL (ref 74–99)
HCT VFR BLD CALC: 40.3 % (ref 34–48)
HEMOGLOBIN: 13.1 G/DL (ref 11.5–15.5)
IMMATURE GRANULOCYTES #: 0.05 E9/L
IMMATURE GRANULOCYTES %: 0.7 % (ref 0–5)
LYMPHOCYTES ABSOLUTE: 0.72 E9/L (ref 1.5–4)
LYMPHOCYTES RELATIVE PERCENT: 10.4 % (ref 20–42)
MAGNESIUM: 2.1 MG/DL (ref 1.6–2.6)
MCH RBC QN AUTO: 29.8 PG (ref 26–35)
MCHC RBC AUTO-ENTMCNC: 32.5 % (ref 32–34.5)
MCV RBC AUTO: 91.6 FL (ref 80–99.9)
MONOCYTES ABSOLUTE: 0.49 E9/L (ref 0.1–0.95)
MONOCYTES RELATIVE PERCENT: 7.1 % (ref 2–12)
NEUTROPHILS ABSOLUTE: 5.62 E9/L (ref 1.8–7.3)
NEUTROPHILS RELATIVE PERCENT: 81.5 % (ref 43–80)
PDW BLD-RTO: 11.9 FL (ref 11.5–15)
PHOSPHORUS: 1.6 MG/DL (ref 2.5–4.5)
PLATELET # BLD: 231 E9/L (ref 130–450)
PMV BLD AUTO: 9.7 FL (ref 7–12)
POTASSIUM SERPL-SCNC: 3.2 MMOL/L (ref 3.5–5)
RBC # BLD: 4.4 E12/L (ref 3.5–5.5)
SODIUM BLD-SCNC: 142 MMOL/L (ref 132–146)
TOTAL PROTEIN: 6.8 G/DL (ref 6.4–8.3)
WBC # BLD: 6.9 E9/L (ref 4.5–11.5)

## 2020-07-09 PROCEDURE — 84100 ASSAY OF PHOSPHORUS: CPT

## 2020-07-09 PROCEDURE — 85025 COMPLETE CBC W/AUTO DIFF WBC: CPT

## 2020-07-09 PROCEDURE — 99232 SBSQ HOSP IP/OBS MODERATE 35: CPT | Performed by: INTERNAL MEDICINE

## 2020-07-09 PROCEDURE — 2060000000 HC ICU INTERMEDIATE R&B

## 2020-07-09 PROCEDURE — 6360000002 HC RX W HCPCS: Performed by: INTERNAL MEDICINE

## 2020-07-09 PROCEDURE — 2580000003 HC RX 258: Performed by: INTERNAL MEDICINE

## 2020-07-09 PROCEDURE — 83735 ASSAY OF MAGNESIUM: CPT

## 2020-07-09 PROCEDURE — 80053 COMPREHEN METABOLIC PANEL: CPT

## 2020-07-09 RX ORDER — PROCHLORPERAZINE EDISYLATE 5 MG/ML
10 INJECTION INTRAMUSCULAR; INTRAVENOUS EVERY 6 HOURS PRN
Status: DISCONTINUED | OUTPATIENT
Start: 2020-07-09 | End: 2020-07-15 | Stop reason: HOSPADM

## 2020-07-09 RX ORDER — POTASSIUM CHLORIDE 7.45 MG/ML
10 INJECTION INTRAVENOUS
Status: COMPLETED | OUTPATIENT
Start: 2020-07-09 | End: 2020-07-09

## 2020-07-09 RX ADMIN — PROCHLORPERAZINE EDISYLATE 10 MG: 5 INJECTION INTRAMUSCULAR; INTRAVENOUS at 08:33

## 2020-07-09 RX ADMIN — POTASSIUM CHLORIDE 10 MEQ: 10 INJECTION, SOLUTION INTRAVENOUS at 20:43

## 2020-07-09 RX ADMIN — PROCHLORPERAZINE EDISYLATE 10 MG: 5 INJECTION INTRAMUSCULAR; INTRAVENOUS at 02:35

## 2020-07-09 RX ADMIN — SODIUM CHLORIDE, PRESERVATIVE FREE 10 ML: 5 INJECTION INTRAVENOUS at 20:44

## 2020-07-09 RX ADMIN — PROCHLORPERAZINE EDISYLATE 10 MG: 5 INJECTION INTRAMUSCULAR; INTRAVENOUS at 21:31

## 2020-07-09 RX ADMIN — PROCHLORPERAZINE EDISYLATE 10 MG: 5 INJECTION INTRAMUSCULAR; INTRAVENOUS at 15:29

## 2020-07-09 RX ADMIN — POTASSIUM CHLORIDE 10 MEQ: 10 INJECTION, SOLUTION INTRAVENOUS at 21:31

## 2020-07-09 NOTE — PROGRESS NOTES
[address] [place] [address] [facility]  Progress Note    SUBJECTIVE:  Chief Complaint   Patient presents with    Emesis    Fever    Other     covid+ 20     Patient is tolerating medications. No reported adverse drug reactions. No nausea, vomiting, diarrhea. Review of systems:  As stated above in the chief complaint, otherwise negative. Medications:  Scheduled Meds:   cetirizine  10 mg Oral Daily    hydroxychloroquine  400 mg Oral BID    metoprolol succinate  100 mg Oral Daily    montelukast  10 mg Oral Nightly    pantoprazole  40 mg Oral Daily    predniSONE  5 mg Oral Daily    sulfaSALAzine  1,000 mg Oral BID    sodium chloride flush  10 mL Intravenous 2 times per day    enoxaparin  40 mg Subcutaneous Daily     Continuous Infusions:   sodium chloride 75 mL/hr at 20 2138     PRN Meds:prochlorperazine, sodium chloride flush, sodium chloride flush, acetaminophen **OR** acetaminophen, polyethylene glycol, trimethobenzamide    OBJECTIVE:  BP (!) 149/70   Pulse 76   Temp 98.2 °F (36.8 °C) (Infrared)   Resp 18   Ht 5' 10\" (1.778 m)   Wt 270 lb 12.8 oz (122.8 kg)   SpO2 91%   BMI 38.86 kg/m²   Temp  Av.1 °F (36.7 °C)  Min: 97.5 °F (36.4 °C)  Max: 98.9 °F (37.2 °C)  Constitutional: The patient is awake, alert, and oriented. Skin: Warm and dry. No rashes were noted. HEENT: Round and reactive pupils. Moist mucous membranes. No ulcerations or thrush. Neck: Supple to movements. Chest: No use of accessory muscles to breathe. Symmetrical expansion. No wheezing, crackles or rhonchi. Cardiovascular: S1 and S2 are rhythmic and regular. No murmurs appreciated. Abdomen: Positive bowel sounds to auscultation. Benign to palpation. No masses felt. No hepatosplenomegaly. Extremities: No clubbing, no cyanosis, no edema.   Lines: peripheral    Laboratory and Tests Review:  Lab Results   Component Value Date    WBC 6.9 2020    WBC 4.9 2020    WBC 4.3 (L) 07/07/2020    HGB 13.1 07/09/2020    HCT 40.3 07/09/2020    MCV 91.6 07/09/2020     07/09/2020     Lab Results   Component Value Date    NEUTROABS 5.62 07/09/2020    NEUTROABS 3.78 07/08/2020    NEUTROABS 3.46 07/07/2020     No results found for: [facility]  Lab Results   Component Value Date    ALT 47 (H) 07/09/2020    AST 43 (H) 07/09/2020    ALKPHOS 78 07/09/2020    BILITOT 0.6 07/09/2020     Lab Results   Component Value Date     07/09/2020    K 3.2 07/09/2020    K 3.3 07/07/2020     07/09/2020    CO2 25 07/09/2020    BUN 17 07/09/2020    CREATININE 0.5 07/09/2020    CREATININE 0.5 07/08/2020    CREATININE 0.7 07/07/2020    GFRAA >60 07/09/2020    LABGLOM >60 07/09/2020    GLUCOSE 112 07/09/2020    PROT 6.8 07/09/2020    LABALBU 3.6 07/09/2020    CALCIUM 8.5 07/09/2020    BILITOT 0.6 07/09/2020    ALKPHOS 78 07/09/2020    AST 43 07/09/2020    ALT 47 07/09/2020     Lab Results   Component Value Date    CRP 10.3 (H) 07/08/2020    CRP 0.4 11/09/2019    CRP 0.8 (H) 08/09/2019     Lab Results   Component Value Date    SEDRATE 41 (H) 07/08/2020    SEDRATE 5 11/09/2019    SEDRATE 5 08/09/2019     Radiology:      Microbiology:   SARS-CoV-2 7/7/2020: Detected    ASSESSMENT:  · SARS-CoV-2 infection  · Intractable nausea associated to the above, improving with Compazine  · Viral pneumonia associated to SARS-CoV-2.   Not requiring oxygen but pulse oximetry is in the low 90s    PLAN:  · Continue supportive treatment  · If the pulse oximetry drops below 90 and the patient requires oxygen she will qualify for Remdesivir and dexamethasone    Edith Anderson  12:35 PM  7/9/2020

## 2020-07-09 NOTE — PROGRESS NOTES
Pulmonary Progress Note    Admit Date: 2020  Hospital day                               PCP: Mary Lyon DO    Chief Complaint (s):  Patient Active Problem List   Diagnosis    Arthritis    Asthma    Acute cystitis without hematuria    Viral URI    Bronchitis    Rheumatoid arthritis of multiple sites with negative rheumatoid factor ([facility] [address]    Tachycardia    Cardiomyopathy ([facility] [address]    LBBB (left bundle branch block)    COVID-19    Pneumonia due to COVID-19 virus    Dehydration    Hypokalemia    Rheumatoid arthritis ([facility] [address]       Subjective:  · Again, sleeping soundly this a.m. Saturations vary between 91 and 96%. The 80 is probably when the patient is asleep with her known sleep apnea. Vitals:  VITALS:  BP (!) 149/70   Pulse 76   Temp 98.2 °F (36.8 °C) (Infrared)   Resp 18   Ht 5' 10\" (1.778 m)   Wt 270 lb 12.8 oz (122.8 kg)   SpO2 91%   BMI 38.86 kg/m²     24HR INTAKE/OUTPUT:      Intake/Output Summary (Last 24 hours) at 2020 0958  Last data filed at 2020 0846  Gross per 24 hour   Intake --   Output 350 ml   Net -350 ml       24HR PULSE OXIMETRY RANGE:    SpO2  Av.5 %  Min: 91 %  Max: 98 %    Medications:  IV:   sodium chloride 75 mL/hr at 20 2138       Scheduled Meds:   cetirizine  10 mg Oral Daily    hydroxychloroquine  400 mg Oral BID    metoprolol succinate  100 mg Oral Daily    montelukast  10 mg Oral Nightly    pantoprazole  40 mg Oral Daily    predniSONE  5 mg Oral Daily    sulfaSALAzine  1,000 mg Oral BID    sodium chloride flush  10 mL Intravenous 2 times per day    enoxaparin  40 mg Subcutaneous Daily       Diet:   DIET GENERAL;     EXAM:  General: No distress. Asleep. Eyes: PERRL. No sclera icterus. No conjunctival injection. ENT: No discharge. Pharynx clear. Neck: Trachea midline. Normal thyroid. Resp: No accessory muscle use. No rales. No wheezing. No rhonchi. CV: Regular rate. Regular rhythm. No murmur or rub.    Abd: Non-tender. Non-distended. No masses. No organomegaly. Normal bowel sounds. Skin: Warm and dry. No nodule on exposed extremities. No rash on exposed extremities. Ext: No cyanosis, clubbing, edema  Lymph: No cervical LAD. No supraclavicular LAD. M/S: No cyanosis. No joint deformity. No clubbing. Neuro: Awake. Follows commands. Positive pupils/gag/corneals. Normal pain response. Results:  CBC:   Recent Labs     07/07/20 0543 07/08/20 0205 07/09/20  0240   WBC 4.3* 4.9 6.9   HGB 14.1 13.4 13.1   HCT 43.1 40.2 40.3   MCV 91.9 91.4 91.6    204 231     BMP:   Recent Labs     07/07/20 0543 07/08/20 0205 07/09/20  0240    139 142   K 3.3* 3.4* 3.2*    98 102   CO2 26 26 25   PHOS  --  2.1* 1.6*   BUN 13 14 17   CREATININE 0.7 0.5 0.5     LIVER PROFILE:   Recent Labs     07/07/20 0543 07/08/20 0205 07/09/20  0240   AST 57* 47* 43*   ALT 54* 48* 47*   BILITOT 0.5 0.5 0.6   ALKPHOS 91 82 78     PT/INR: No results for input(s): PROTIME, INR in the last 72 hours. APTT: No results for input(s): APTT in the last 72 hours. Pathology:  1. N/A      Microbiology:  1. None    Recent ABG:   No results for input(s): PH, PO2, PCO2, HCO3, BE, O2SAT, METHB, O2HB, COHB, O2CON, HHB, THB in the last 72 hours. Recent Films:  CTA PULMONARY W CONTRAST   Final Result   No central pulmonary embolism or aortic dissection. Multifocal patchy and groundglass opacities bilaterally with  moderate   mediastinal and hilar adenopathy and pleural effusion likely   multifocal pneumonia including Covid 19. XR CHEST PORTABLE   Final Result   Mild CHF without edema                         Assessment:  1. COVID-19 infection with pneumonia. At this point, there is no hypoxia therefore no indication for antivirals or Decadron  2. Underlying asthma  3. Seronegative rheumatoid arthritis  4.  Sleep apnea: Currently a moot point given that we do not know settings and the patient cannot be on positive

## 2020-07-09 NOTE — PROGRESS NOTES
[facility] Progress Note    Admitting Date and Time: 7/7/2020  5:30 AM  Admit Dx: [id number] [U07.1]  COVID-19 [U07.1]    Subjective:  Patient is being followed for COVID-19 [U07.1]  COVID-19 [U07.1]   Pt feels nauseous, she threw up 10 times yesterday, she only had couple of sips of lemonade in the morning, she was not able to tolerate her lunch.        cetirizine  10 mg Oral Daily    hydroxychloroquine  400 mg Oral BID    metoprolol succinate  100 mg Oral Daily    montelukast  10 mg Oral Nightly    pantoprazole  40 mg Oral Daily    predniSONE  5 mg Oral Daily    sulfaSALAzine  1,000 mg Oral BID    sodium chloride flush  10 mL Intravenous 2 times per day    enoxaparin  40 mg Subcutaneous Daily     prochlorperazine, 10 mg, Q6H PRN  sodium chloride flush, 10 mL, PRN  sodium chloride flush, 10 mL, PRN  acetaminophen, 650 mg, Q6H PRN    Or  acetaminophen, 650 mg, Q6H PRN  polyethylene glycol, 17 g, Daily PRN  trimethobenzamide, 200 mg, Q6H PRN         Objective:    BP (!) 149/70   Pulse 76   Temp 98.2 °F (36.8 °C) (Infrared)   Resp 18   Ht 5' 10\" (1.778 m)   Wt 270 lb 12.8 oz (122.8 kg)   SpO2 91%   BMI 38.86 kg/m²     General Appearance: alert and oriented to person, place and time  Skin: warm and dry  Head: normocephalic and atraumatic  Eyes: pupils equal, round, and reactive to light, extraocular eye movements intact, conjunctivae normal  Neck: neck supple and non tender without mass   Pulmonary/Chest: clear to auscultation bilaterally- no wheezes, rales or rhonchi, normal air movement, no respiratory distress  Cardiovascular: normal rate, normal S1 and S2 and no carotid bruits  Abdomen: soft, non-tender, non-distended, normal bowel sounds, no masses or organomegaly  Extremities: no cyanosis, no clubbing and no edema  Neurologic: no cranial nerve deficit and speech normal        Recent Labs     07/07/20  0543 07/08/20  0205 07/09/20  0240    139 142   K 3.3* 3.4* 3.2*    98 102   CO2 26 26 25   BUN 13 14 17   CREATININE 0.7 0.5 0.5   GLUCOSE 132* 92 112*   CALCIUM 8.6 8.4* 8.5*       Recent Labs     07/07/20  0543 07/08/20  0205 07/09/20  0240   WBC 4.3* 4.9 6.9   RBC 4.69 4.40 4.40   HGB 14.1 13.4 13.1   HCT 43.1 40.2 40.3   MCV 91.9 91.4 91.6   MCH 30.1 30.5 29.8   MCHC 32.7 33.3 32.5   RDW 12.1 11.9 11.9    204 231   MPV 9.9 9.9 9.7           Assessment:    Active Problems:    COVID-19    Pneumonia due to COVID-19 virus    Dehydration    Hypokalemia    Rheumatoid arthritis (HCC)  Resolved Problems:    * No resolved hospital problems. *      Plan:  1. Acute COVID 19 pneumonia evident on the CT with positive test, no hypoxia, no indication for Decadron or Remdesivir. Will discuss with ID starting Plaquenil based on the new study coming from [facility]  2. Nausea and vomiting, most likely due to viral infection, continue on Compazine as per patient worked better than other antiemetic agents. 3.  History of asthma, not in exacerbation, continue home inhalers. NOTE: This report was transcribed using voice recognition software. Every effort was made to ensure accuracy; however, inadvertent computerized transcription errors may be present.   Electronically signed by Norman Rosario MD on 7/9/2020 at 10:33 AM

## 2020-07-09 NOTE — CARE COORDINATION
CM NOTE: Per QFR---COVID POSITIVE. Pulmonology & ID on case & following. Per both--- If the pulse oximetry drops below 90 and the patient requires oxygen she will qualify for Remdesivir and dexamethasone.

## 2020-07-10 ENCOUNTER — APPOINTMENT (OUTPATIENT)
Dept: GENERAL RADIOLOGY | Age: 55
DRG: 177 | End: 2020-07-10
Payer: COMMERCIAL

## 2020-07-10 LAB
ALBUMIN SERPL-MCNC: 3.3 G/DL (ref 3.5–5.2)
ALP BLD-CCNC: 66 U/L (ref 35–104)
ALT SERPL-CCNC: 39 U/L (ref 0–32)
ANION GAP SERPL CALCULATED.3IONS-SCNC: 17 MMOL/L (ref 7–16)
AST SERPL-CCNC: 32 U/L (ref 0–31)
BASOPHILS ABSOLUTE: 0.01 E9/L (ref 0–0.2)
BASOPHILS RELATIVE PERCENT: 0.1 % (ref 0–2)
BILIRUB SERPL-MCNC: 0.6 MG/DL (ref 0–1.2)
BUN BLDV-MCNC: 17 MG/DL (ref 6–20)
C-REACTIVE PROTEIN: 14.9 MG/DL (ref 0–0.4)
CALCIUM SERPL-MCNC: 8.3 MG/DL (ref 8.6–10.2)
CHLORIDE BLD-SCNC: 102 MMOL/L (ref 98–107)
CO2: 23 MMOL/L (ref 22–29)
CREAT SERPL-MCNC: 0.5 MG/DL (ref 0.5–1)
EOSINOPHILS ABSOLUTE: 0 E9/L (ref 0.05–0.5)
EOSINOPHILS RELATIVE PERCENT: 0 % (ref 0–6)
GFR AFRICAN AMERICAN: >60
GFR NON-AFRICAN AMERICAN: >60 ML/MIN/1.73
GLUCOSE BLD-MCNC: 84 MG/DL (ref 74–99)
HCT VFR BLD CALC: 38.1 % (ref 34–48)
HEMOGLOBIN: 12.2 G/DL (ref 11.5–15.5)
IMMATURE GRANULOCYTES #: 0.06 E9/L
IMMATURE GRANULOCYTES %: 0.8 % (ref 0–5)
LYMPHOCYTES ABSOLUTE: 1.12 E9/L (ref 1.5–4)
LYMPHOCYTES RELATIVE PERCENT: 15.5 % (ref 20–42)
MAGNESIUM: 2.1 MG/DL (ref 1.6–2.6)
MCH RBC QN AUTO: 29.7 PG (ref 26–35)
MCHC RBC AUTO-ENTMCNC: 32 % (ref 32–34.5)
MCV RBC AUTO: 92.7 FL (ref 80–99.9)
MONOCYTES ABSOLUTE: 0.55 E9/L (ref 0.1–0.95)
MONOCYTES RELATIVE PERCENT: 7.6 % (ref 2–12)
NEUTROPHILS ABSOLUTE: 5.48 E9/L (ref 1.8–7.3)
NEUTROPHILS RELATIVE PERCENT: 76 % (ref 43–80)
PDW BLD-RTO: 12.2 FL (ref 11.5–15)
PLATELET # BLD: 254 E9/L (ref 130–450)
PMV BLD AUTO: 9.7 FL (ref 7–12)
POTASSIUM SERPL-SCNC: 3.3 MMOL/L (ref 3.5–5)
RBC # BLD: 4.11 E12/L (ref 3.5–5.5)
SEDIMENTATION RATE, ERYTHROCYTE: 46 MM/HR (ref 0–20)
SODIUM BLD-SCNC: 142 MMOL/L (ref 132–146)
TOTAL PROTEIN: 6.4 G/DL (ref 6.4–8.3)
WBC # BLD: 7.2 E9/L (ref 4.5–11.5)

## 2020-07-10 PROCEDURE — 74018 RADEX ABDOMEN 1 VIEW: CPT

## 2020-07-10 PROCEDURE — 83735 ASSAY OF MAGNESIUM: CPT

## 2020-07-10 PROCEDURE — 6360000002 HC RX W HCPCS: Performed by: INTERNAL MEDICINE

## 2020-07-10 PROCEDURE — 85651 RBC SED RATE NONAUTOMATED: CPT

## 2020-07-10 PROCEDURE — 85025 COMPLETE CBC W/AUTO DIFF WBC: CPT

## 2020-07-10 PROCEDURE — 80053 COMPREHEN METABOLIC PANEL: CPT

## 2020-07-10 PROCEDURE — 99232 SBSQ HOSP IP/OBS MODERATE 35: CPT | Performed by: INTERNAL MEDICINE

## 2020-07-10 PROCEDURE — 86140 C-REACTIVE PROTEIN: CPT

## 2020-07-10 PROCEDURE — 36415 COLL VENOUS BLD VENIPUNCTURE: CPT

## 2020-07-10 PROCEDURE — 2060000000 HC ICU INTERMEDIATE R&B

## 2020-07-10 PROCEDURE — 2580000003 HC RX 258: Performed by: INTERNAL MEDICINE

## 2020-07-10 RX ORDER — POTASSIUM CHLORIDE 7.45 MG/ML
10 INJECTION INTRAVENOUS
Status: COMPLETED | OUTPATIENT
Start: 2020-07-10 | End: 2020-07-10

## 2020-07-10 RX ORDER — CHOLESTYRAMINE 4 G/9G
1 POWDER, FOR SUSPENSION ORAL 2 TIMES DAILY
Status: DISCONTINUED | OUTPATIENT
Start: 2020-07-10 | End: 2020-07-15

## 2020-07-10 RX ORDER — PROMETHAZINE HYDROCHLORIDE 25 MG/ML
6.25 INJECTION, SOLUTION INTRAMUSCULAR; INTRAVENOUS EVERY 6 HOURS PRN
Status: DISCONTINUED | OUTPATIENT
Start: 2020-07-10 | End: 2020-07-15 | Stop reason: HOSPADM

## 2020-07-10 RX ADMIN — POTASSIUM CHLORIDE 10 MEQ: 10 INJECTION, SOLUTION INTRAVENOUS at 12:50

## 2020-07-10 RX ADMIN — POTASSIUM CHLORIDE 10 MEQ: 10 INJECTION, SOLUTION INTRAVENOUS at 07:15

## 2020-07-10 RX ADMIN — POTASSIUM CHLORIDE 10 MEQ: 10 INJECTION, SOLUTION INTRAVENOUS at 09:25

## 2020-07-10 RX ADMIN — SODIUM CHLORIDE: 9 INJECTION, SOLUTION INTRAVENOUS at 17:10

## 2020-07-10 RX ADMIN — POTASSIUM CHLORIDE 10 MEQ: 10 INJECTION, SOLUTION INTRAVENOUS at 11:25

## 2020-07-10 RX ADMIN — PROCHLORPERAZINE EDISYLATE 10 MG: 5 INJECTION INTRAMUSCULAR; INTRAVENOUS at 10:55

## 2020-07-10 RX ADMIN — PROCHLORPERAZINE EDISYLATE 10 MG: 5 INJECTION INTRAMUSCULAR; INTRAVENOUS at 04:28

## 2020-07-10 NOTE — PROGRESS NOTES
Pulse ox was _94_____% on room air at rest.  Ambulated patient on room air. Oxygen saturation was ___91___% on room air while ambulating. Recovery pulse ox was ___94___% on ____0___ liters of oxygen while ambulating.

## 2020-07-10 NOTE — PROGRESS NOTES
Pulmonary Progress Note    Admit Date: 2020  Hospital day                               PCP: Tata Castillo DO    Chief Complaint (s):  Patient Active Problem List   Diagnosis    Arthritis    Asthma    Acute cystitis without hematuria    Viral URI    Bronchitis    Rheumatoid arthritis of multiple sites with negative rheumatoid factor [facility]    Tachycardia    Cardiomyopathy ([facility]    LBBB (left bundle branch block)    COVID-19    Pneumonia due to COVID-19 virus    Dehydration    Hypokalemia    Rheumatoid arthritis ([facility]       Subjective:  · Resting again this a.m. Oxygen saturation remains good. Lymphopenia persists with mild hypokalemia and hypocalcemia. LFTs are trending down. Vitals:  VITALS:  BP (!) 161/73   Pulse 75   Temp 98.3 °F (36.8 °C) (Infrared)   Resp 18   Ht 5' 10\" (1.778 m)   Wt 270 lb 12.8 oz (122.8 kg)   SpO2 92%   BMI 38.86 kg/m²     24HR INTAKE/OUTPUT:      Intake/Output Summary (Last 24 hours) at 7/10/2020 1018  Last data filed at 7/10/2020 1011  Gross per 24 hour   Intake 1529 ml   Output 700 ml   Net 829 ml       24HR PULSE OXIMETRY RANGE:    SpO2  Av %  Min: 90 %  Max: 92 %    Medications:  IV:   sodium chloride 75 mL/hr at 20 2138       Scheduled Meds:   potassium chloride  10 mEq Intravenous Q1H    cetirizine  10 mg Oral Daily    hydroxychloroquine  400 mg Oral BID    metoprolol succinate  100 mg Oral Daily    montelukast  10 mg Oral Nightly    pantoprazole  40 mg Oral Daily    predniSONE  5 mg Oral Daily    sulfaSALAzine  1,000 mg Oral BID    sodium chloride flush  10 mL Intravenous 2 times per day    enoxaparin  40 mg Subcutaneous Daily       Diet:   DIET GENERAL;     EXAM:  General: No distress. Asleep. Eyes: PERRL. No sclera icterus. No conjunctival injection. ENT: No discharge. Pharynx clear. Neck: Trachea midline. Normal thyroid. Resp: No accessory muscle use. No rales. No wheezing. No rhonchi. CV: Regular rate. Regular rhythm. No murmur or rub. Abd: Non-tender. Non-distended. No masses. No organomegaly. Normal bowel sounds. Skin: Warm and dry. No nodule on exposed extremities. No rash on exposed extremities. Ext: No cyanosis, clubbing, edema  Lymph: No cervical LAD. No supraclavicular LAD. M/S: No cyanosis. No joint deformity. No clubbing. Neuro: Awake. Follows commands. Positive pupils/gag/corneals. Normal pain response. Results:  CBC:   Recent Labs     07/08/20  0205 07/09/20  0240 07/10/20  0415   WBC 4.9 6.9 7.2   HGB 13.4 13.1 12.2   HCT 40.2 40.3 38.1   MCV 91.4 91.6 92.7    231 254     BMP:   Recent Labs     07/08/20  0205 07/09/20  0240 07/10/20  0415    142 142   K 3.4* 3.2* 3.3*   CL 98 102 102   CO2 26 25 23   PHOS 2.1* 1.6*  --    BUN 14 17 17   CREATININE 0.5 0.5 0.5     LIVER PROFILE:   Recent Labs     07/08/20  0205 07/09/20  0240 07/10/20  0415   AST 47* 43* 32*   ALT 48* 47* 39*   BILITOT 0.5 0.6 0.6   ALKPHOS 82 78 66     PT/INR: No results for input(s): PROTIME, INR in the last 72 hours. APTT: No results for input(s): APTT in the last 72 hours. Pathology:  1. N/A      Microbiology:  1. None    Recent ABG:   No results for input(s): PH, PO2, PCO2, HCO3, BE, O2SAT, METHB, O2HB, COHB, O2CON, HHB, THB in the last 72 hours. Recent Films:  CTA PULMONARY W CONTRAST   Final Result   No central pulmonary embolism or aortic dissection. Multifocal patchy and groundglass opacities bilaterally with  moderate   mediastinal and hilar adenopathy and pleural effusion likely   multifocal pneumonia including Covid 19. XR CHEST PORTABLE   Final Result   Mild CHF without edema                         Assessment:  1. COVID-19 infection with pneumonia. At this point, there is no hypoxia therefore no indication for antivirals or Decadron  2. Underlying asthma  3. Seronegative rheumatoid arthritis  4.  Sleep apnea: Currently a moot point given that we do not know settings and the patient cannot be on positive airway pressure.        Plan:  1. Supportive care for now. If patient's SPO2 slipped below 89% and she requires oxygen, a combination of Decadron and Remsesivir are indicated. So far, its held above that level.         Time at the bedside, reviewing labs and radiographs, reviewing updated notes and consultations, discussing with staff and family was more than 35 minutes. Please note that voice recognition technology was used in the preparation of this note and make therefore it may contain inadvertent transcription errors. If the patient is a COVID 19 isolation patient, the above physical exam reflects that of the examining physician for the day. Christianne Us M.D., F.C.C.P.     [facility], [address] [place] [address], [address] [place] [address]

## 2020-07-10 NOTE — CARE COORDINATION
CM NOTE: Per QFR--- continue IV remdesivir & IV decadron. Hypokalemia this am. Receiving IV KCL x4 bags today. Currently on room air. CM spoke with pt again this am---declines need for home care. States she would only be agreeable to [facility] if she goes home on IV medications or new oxygen. CM & SW will continue to follow pt.

## 2020-07-10 NOTE — CONSULTS
Gastroenterology Consult Note   [employer] Repine ACNS-BC with Mikhail Martínez M.D. Consult Note        Date of Service: 7/10/2020  Reason for Consult: intractable vomiting  Requesting Physician: Dr Ambrose Sender:  Nausea, vomiting, diarrhea, fever, and COVID + 7/1/2020 and on admission    History Obtained From:  TELEVISIT with patient, BS RN Candace Gillis, and electronic medical record    HISTORY OF PRESENT ILLNESS:       Srinivasan Vinson is a 63 y.o. female with significant past medical history of asthma, RA on Plaquenil, lt BBB, kidney stone, cardiomyopathy, asthma, and arthritis admitted via ED 7/7/2020 for nausea, vomiting, diarrhea, and fever. Pt was reportedly diagnosed with COVID-19 on 7/1/2020, states she was feeling fine at home for about 5 days then her symptoms worsened. Pt states she had vomiting x 2.5 days PTA with at least 10 bouts of emesis a day of mucus, phlegm, then bile. Diarrhea up to 5 times a day, dark brown stool, watery with nocturnal diarrhea. Pt states she was taking a Z-Domingo, the diarrhea started prior to the antibiotic. Pt reports chills and fever of 100° F max at home states she continues to have chills. Pt denies abd pain, melena, hematochezia, or wt loss. Pt states last colonoscopy was within the past 2 yrs with Dr Rosario Membreno, she denies polyps. Pt denies EGD in past. She has hx cherry. Admission labs: ALT 54; AST 57; K+ 3.3; ; WBC 4.3; lymph 14.7%; abs lymph 0.64; abs eos 0.00; SARS CoV-2 +; COVID-19 +. CTA Pulm W Contrast - No central pulmonary embolism or aortic dissection. Multifocal patchy and groundglass opacities bilaterally with  moderate mediastinal and hilar adenopathy and pleural effusion likely multifocal pneumonia including Covid 19. Consultation for intractable vomiting. Currently, pt reports with the compazine, she has emesis of mostly mucus and return of food where she is unable to take pills or food because she gags and it comes right back up.  Today she states chloride flush 0.9 % injection 10 mL, 10 mL, Intravenous, PRN  acetaminophen (TYLENOL) tablet 650 mg, 650 mg, Oral, Q6H PRN **OR** acetaminophen (TYLENOL) suppository 650 mg, 650 mg, Rectal, Q6H PRN  polyethylene glycol (GLYCOLAX) packet 17 g, 17 g, Oral, Daily PRN  enoxaparin (LOVENOX) injection 40 mg, 40 mg, Subcutaneous, Daily    Allergies:  Penicillins    Social History:    Tobacco:  Pt denies. Alcohol:  Pt reports she drinks alcohol rarely on social occasions. Illicit Drugs: Pt denies. Family History: Mother , RA and CHF  Father , Colon CA  2 brothers living and healthy. REVIEW OF SYSTEMS:    Aside from what was mentioned in the PMH and HPI, essentially unremarkable, all others negative. PHYSICAL EXAM:      Vitals:    BP (!) 161/73   Pulse 75   Temp 98.3 °F (36.8 °C) (Infrared)   Resp 18   Ht 5' 10\" (1.778 m)   Wt 270 lb 12.8 oz (122.8 kg)   SpO2 92%   BMI 38.86 kg/m²     ASSESSMENT DEFERRED, TELEVISIT.     DATA:    CBC with Differential:    Lab Results   Component Value Date    WBC 7.2 07/10/2020    RBC 4.11 07/10/2020    HGB 12.2 07/10/2020    HCT 38.1 07/10/2020     07/10/2020    MCV 92.7 07/10/2020    MCH 29.7 07/10/2020    MCHC 32.0 07/10/2020    RDW 12.2 07/10/2020    METASPCT 2 01/10/2016    LYMPHOPCT 15.5 07/10/2020    MONOPCT 7.6 07/10/2020    BASOPCT 0.1 07/10/2020    ATYLYMREL 3.0 2020    MONOSABS 0.55 07/10/2020    LYMPHSABS 1.12 07/10/2020    EOSABS 0.00 07/10/2020    BASOSABS 0.01 07/10/2020     CMP:    Lab Results   Component Value Date     07/10/2020    K 3.3 07/10/2020    K 3.3 2020     07/10/2020    CO2 23 07/10/2020    BUN 17 07/10/2020    CREATININE 0.5 07/10/2020    GFRAA >60 07/10/2020    LABGLOM >60 07/10/2020    GLUCOSE 84 07/10/2020    PROT 6.4 07/10/2020    LABALBU 3.3 07/10/2020    CALCIUM 8.3 07/10/2020    BILITOT 0.6 07/10/2020    ALKPHOS 66 07/10/2020    AST 32 07/10/2020    ALT 39 07/10/2020     Hepatic Function Panel:    Lab Results   Component Value Date    ALKPHOS 66 07/10/2020    ALT 39 07/10/2020    AST 32 07/10/2020    PROT 6.4 07/10/2020    BILITOT 0.6 07/10/2020    BILIDIR <0.2 2019    IBILI see below 2019    LABALBU 3.3 07/10/2020     PT/INR:  No results found for: PROTIME, INR  PTT:  No results found for: APTT, PTT[APTT}  Last 3 Troponin:    Lab Results   Component Value Date    TROPONINI <0.01 2020    TROPONINI <0.01 2020    TROPONINI <0.01 2020     TSH:    Lab Results   Component Value Date    TSH 1.770 2020     FERRITIN:    Lab Results   Component Value Date    FERRITIN 261 2020       No components found for: CHLPL    Lab Results   Component Value Date    TRIG 46 2019       Lab Results   Component Value Date    HDL 81 2019       Lab Results   Component Value Date    LDLCALC 80 2019       Lab Results   Component Value Date    LABVLDL 9 2019        Xr Chest Portable    Result Date: 2020  Patient MRN:  [id number] : 1965 Age: 63 years Gender: Female Order Date:  2020 5:45 AM EXAM: XR CHEST PORTABLE one image INDICATION:  shortness of breath shortness of breath COMPARISON: 2020 FINDINGS: There is mild  cardiomegaly. There is vascular congestion with the perihilar and bibasilar atelectasis. Tiny left pleural effusion is suspected. There is no focal consolidation. Mild CHF without edema     Cta Pulmonary W Contrast    Result Date: 2020  Patient MRN:  [id number] : 1965 Age: 63 years Gender: Female Order Date:  2020 7:15 AM EXAM: CTA PULMONARY W CONTRAST number of images 500 including MIP coronal and sagittal reconstruction images. Contrast. Isovue-370, 90 mL intravenously. Technique: Low-dose CT  acquisition technique included one of following options; 1 . Automated exposure control, 2. Adjustment of MA and or KV according to patient's size or 3. Use of iterative reconstruction.  INDICATION:  Hypoxia/COVID Hypoxia/COVID COMPARISON: None FINDINGS: The heart and the great vessels are normal. Small to moderate lymph nodes measuring up to 1.1 cm are identified in the mediastinum and pulmonary hilum. There are no filling defects in the main pulmonary artery and the central branches. There are patchy and multifocal groundglass opacities in the upper and lower lobes bilaterally with tiny pleural effusions. The liver is fatty infiltrated. Gallbladder is absent. 1.3 cm left renal cyst is present. No central pulmonary embolism or aortic dissection. Multifocal patchy and groundglass opacities bilaterally with  moderate mediastinal and hilar adenopathy and pleural effusion likely multifocal pneumonia including Covid 19. IMPRESSION:  · Intractable nausea and vomiting  · Diarrhea   · COVID-19 with pneumonia - defer to Pulm/ID  · Asthma - defer to Pulm  · RA on Plaquenil and Humira PTA - defer to PCP  · Elevated transaminases dating back to 2/2020  · Hypokalemia - defer to PCP    RECOMMENDATIONS:    · STAT KUB, call me with results  · DC Tigan  · Phenergan IV as ordered to alt with Compazine  · Stool studies as ordered  · Cholestyramine as ordered, premedicate with antiemetic prior to giving  · Clear liquid diet  · CRP and Sed rate  · IV fluids per PCP  · Monitor CMP, CBC  · Supportive care  · Defer comorbidities to others   · Continue to monitor    Note: This report was completed utilizing computer voice recognition software. Every effort has been made to ensure accuracy, however; inadvertent computerized transcription errors may be present. Thank you very much for your consultation. We will follow closely with you. Discussed with Dr. Aysha Crocker developed by Dr. Waller Covert [id number], FNP-BC 7/10/2020 2:40 PM for Dr. Shaneka Clemente. AGREE WITH THE CONSULTATION AND PLAN AS OUTLINED ABOVE. ADDITION AND CORRECTIONS WERE DONE AS DEEMED APPROPRIATE.    MY EXAM AND PLAN INCLUDE: NAUSEA, VOMITING, AND DIARRHEA LIKELY COVID RELATED. MEDS ADJUSTED. KUB ORDERED. WILL FOLLOW COURSE TOMORROW AND PROCEED ACCORDINGLY. NO EVIDENCE OF EMELIA.

## 2020-07-10 NOTE — PROGRESS NOTES
[facility] Quality Flow/Interdisciplinary Rounds Progress Note        Quality Flow Rounds held on July 10, 2020    Disciplines Attending:  Bedside Nurse, ,  and Nursing Unit Leadership    Matthew Bruno was admitted on 7/7/2020  5:30 AM    Anticipated Discharge Date:  Expected Discharge Date: 07/10/20    Disposition:    Kody Score:  Kody Scale Score: 20    Readmission Risk              Risk of Unplanned Readmission:        24           Discussed patient goal for the day, patient clinical progression, and barriers to discharge. The following Goal(s) of the Day/Commitment(s) have been identified:  Replace potassium, check discharge.       Porsche Meraz  July 10, 2020

## 2020-07-10 NOTE — PROGRESS NOTES
[address] [place] [address] [facility]  Progress Note    SUBJECTIVE:  Chief Complaint   Patient presents with    Emesis    Fever    Other     covid+ 20     She's nauseous    Review of systems:  As stated above in the chief complaint, otherwise negative. Medications:  Scheduled Meds:   cholestyramine  1 packet Oral BID    cetirizine  10 mg Oral Daily    hydroxychloroquine  400 mg Oral BID    metoprolol succinate  100 mg Oral Daily    montelukast  10 mg Oral Nightly    pantoprazole  40 mg Oral Daily    predniSONE  5 mg Oral Daily    sulfaSALAzine  1,000 mg Oral BID    sodium chloride flush  10 mL Intravenous 2 times per day    enoxaparin  40 mg Subcutaneous Daily     Continuous Infusions:   sodium chloride 75 mL/hr at 20 2138     PRN Meds:promethazine, prochlorperazine, sodium chloride flush, acetaminophen **OR** acetaminophen, polyethylene glycol    OBJECTIVE:  BP (!) 161/73   Pulse 75   Temp 98.3 °F (36.8 °C) (Infrared)   Resp 18   Ht 5' 10\" (1.778 m)   Wt 270 lb 12.8 oz (122.8 kg)   SpO2 92%   BMI 38.86 kg/m²   Temp  Av.3 °F (36.8 °C)  Min: 97.6 °F (36.4 °C)  Max: 98.9 °F (37.2 °C)  Constitutional: The patient is awake, alert, and oriented. Skin: Warm and dry. No rashes were noted. HEENT: Round and reactive pupils. Moist mucous membranes. No ulcerations or thrush. Neck: Supple to movements. Chest: No use of accessory muscles to breathe. Symmetrical expansion. No wheezing, crackles or rhonchi. Cardiovascular: S1 and S2 are rhythmic and regular. No murmurs appreciated. Abdomen: Positive bowel sounds to auscultation. Benign to palpation. No masses felt. No hepatosplenomegaly. Extremities: No clubbing, no cyanosis, no edema.   Lines: peripheral    Laboratory and Tests Review:  Lab Results   Component Value Date    WBC 7.2 07/10/2020    WBC 6.9 2020    WBC 4.9 2020    HGB 12.2 07/10/2020    HCT 38.1 07/10/2020    MCV 92.7 07/10/2020    PLT 254 07/10/2020     Lab Results   Component Value Date    NEUTROABS 5.48 07/10/2020    NEUTROABS 5.62 07/09/2020    NEUTROABS 3.78 07/08/2020     No results found for: CRPHS  Lab Results   Component Value Date    ALT 39 (H) 07/10/2020    AST 32 (H) 07/10/2020    ALKPHOS 66 07/10/2020    BILITOT 0.6 07/10/2020     Lab Results   Component Value Date     07/10/2020    K 3.3 07/10/2020    K 3.3 07/07/2020     07/10/2020    CO2 23 07/10/2020    BUN 17 07/10/2020    CREATININE 0.5 07/10/2020    CREATININE 0.5 07/09/2020    CREATININE 0.5 07/08/2020    GFRAA >60 07/10/2020    LABGLOM >60 07/10/2020    GLUCOSE 84 07/10/2020    PROT 6.4 07/10/2020    LABALBU 3.3 07/10/2020    CALCIUM 8.3 07/10/2020    BILITOT 0.6 07/10/2020    ALKPHOS 66 07/10/2020    AST 32 07/10/2020    ALT 39 07/10/2020     Lab Results   Component Value Date    CRP 10.3 (H) 07/08/2020    CRP 0.4 11/09/2019    CRP 0.8 (H) 08/09/2019     Lab Results   Component Value Date    SEDRATE 41 (H) 07/08/2020    SEDRATE 5 11/09/2019    SEDRATE 5 08/09/2019     Radiology:      Microbiology:   SARS-CoV-2 7/7/2020: Detected    ASSESSMENT:  · SARS-CoV-2 infection  · Intractable nausea associated to the above, improving with Compazine  · Viral pneumonia associated to SARS-CoV-2.   Not requiring oxygen but pulse oximetry is in the low 90s    PLAN:  · Continue supportive treatment  · If the pulse oximetry drops below 90 and the patient requires oxygen she will qualify for Remdesivir and dexamethasone  · Interestingly she was already on plaquenil  and 5 mg prednisone for RA    [place]  3:03 PM  7/10/2020

## 2020-07-10 NOTE — PROGRESS NOTES
PROGRESS NOTE    Patient Presents with/Seen in Consultation For      *intractable vomiting  CHIEF COMPLAINT:  Nausea, vomiting, diarrhea, fever, and COVID + 7/1/2020 and on admission    Subjective:     Tele-visit done with patient, patient in COVID isolation. Patient states she has had no BM since yesterday morning. Patient reports no abdominal pain. Patient states her vomiting is much less however she has had a few bouts of small amounts of return of ice chips when eating them. Patient states Compazine and Phenergan are helping. Discussed with patient will add Reglan and if she tolerates clear liquids will advance diet later today, she agrees. Discussed KUB results with patient with all questions answered. Patient afebrile. Review of Systems  Aside from what was mentioned in the PMH and HPI, essentially unremarkable, all others negative. Objective:     BP (!) 160/80   Pulse 81   Temp 97.6 °F (36.4 °C) (Temporal)   Resp 18   Ht 5' 10\" (1.778 m)   Wt 270 lb 12.8 oz (122.8 kg)   SpO2 94%   BMI 38.86 kg/m²     Deferred, tele-visit done, patient in COVID isolation.     metoclopramide (REGLAN) injection 5 mg, TID AC  promethazine (PHENERGAN) injection 6.25 mg, Q6H PRN  cholestyramine (QUESTRAN) packet 4 g, BID  prochlorperazine (COMPAZINE) injection 10 mg, Q6H PRN  0.9 % sodium chloride infusion, Continuous  cetirizine (ZYRTEC) tablet 10 mg, Daily  hydroxychloroquine (PLAQUENIL) tablet 400 mg, BID  metoprolol succinate (TOPROL XL) extended release tablet 100 mg, Daily  montelukast (SINGULAIR) tablet 10 mg, Nightly  pantoprazole (PROTONIX) tablet 40 mg, Daily  predniSONE (DELTASONE) tablet 5 mg, Daily  sulfaSALAzine (AZULFIDINE) tablet 1,000 mg, BID  sodium chloride flush 0.9 % injection 10 mL, 2 times per day  sodium chloride flush 0.9 % injection 10 mL, PRN  acetaminophen (TYLENOL) tablet 650 mg, Q6H PRN    Or  acetaminophen (TYLENOL) suppository 650 mg, Q6H PRN  polyethylene glycol (GLYCOLAX) packet 17 g, Daily PRN  enoxaparin (LOVENOX) injection 40 mg, Daily         Data Review  CBC:   Lab Results   Component Value Date    WBC 7.2 07/10/2020    RBC 4.11 07/10/2020    HGB 12.2 07/10/2020    HCT 38.1 07/10/2020    MCV 92.7 07/10/2020    MCH 29.7 07/10/2020    MCHC 32.0 07/10/2020    RDW 12.2 07/10/2020     07/10/2020    MPV 9.7 07/10/2020     CMP:    Lab Results   Component Value Date     07/11/2020    K 3.4 07/11/2020    K 3.3 07/07/2020     07/11/2020    CO2 21 07/11/2020    BUN 14 07/11/2020    CREATININE 0.5 07/11/2020    GFRAA >60 07/11/2020    LABGLOM >60 07/11/2020    GLUCOSE 83 07/11/2020    PROT 6.2 07/11/2020    LABALBU 2.7 07/11/2020    CALCIUM 8.2 07/11/2020    BILITOT 0.7 07/11/2020    ALKPHOS 61 07/11/2020    AST 43 07/11/2020    ALT 41 07/11/2020     Hepatic Function Panel:    Lab Results   Component Value Date    ALKPHOS 61 07/11/2020    ALT 41 07/11/2020    AST 43 07/11/2020    PROT 6.2 07/11/2020    BILITOT 0.7 07/11/2020    BILIDIR <0.2 08/09/2019    IBILI see below 08/09/2019    LABALBU 2.7 07/11/2020     No components found for: CHLPLat  Lab Results   Component Value Date    TRIG 46 11/09/2019   e    [address] 80 11/09/2019             Lab Results   Component Value Date    HDL 81 11/09/2019   e Lab Results   Component Value Date    LDLCALC 80 11/09/2019   Date    FERRITIN 261 07/08/2020         Assessment:     Active Problems:  ? Intractable nausea and vomiting - improving  ? Diarrhea - improved  ? COVID-19 with pneumonia - defer to Pulm/ID  ? Asthma - defer to Pulm  ? RA on Plaquenil and Humira PTA - defer to PCP  ? Elevated transaminases dating back to 2/2020  ? Hypokalemia - defer to PCP      Plan:     ? Continue Phenergan IV to alt with Compazine  ? Start Reglan IV 5 mg TID AC meals as ordered  ? Stool studies as ordered - not yet sent, pt without BM  ? Continue Cholestyramine as ordered   ? Continue clear liquid diet  ? IV fluids per PCP  ?  Monitor CMP, CBC  ? Supportive care  ? Defer comorbidities to others   ? Continue to monitor      Note: This report was completed utilizing computer voice recognition software. Every effort has been made to ensure accuracy, however; inadvertent computerized transcription errors may be present.      Discussed with Dr. Robin Siddiqui per Dr. Phi Benjamin [id number], FNP-BC 7/11/2020 12:03 PM For Dr. Angela Payton

## 2020-07-11 LAB
ALBUMIN SERPL-MCNC: 2.7 G/DL (ref 3.5–5.2)
ALP BLD-CCNC: 61 U/L (ref 35–104)
ALT SERPL-CCNC: 41 U/L (ref 0–32)
ANION GAP SERPL CALCULATED.3IONS-SCNC: 16 MMOL/L (ref 7–16)
AST SERPL-CCNC: 43 U/L (ref 0–31)
BILIRUB SERPL-MCNC: 0.7 MG/DL (ref 0–1.2)
BUN BLDV-MCNC: 14 MG/DL (ref 6–20)
CALCIUM SERPL-MCNC: 8.2 MG/DL (ref 8.6–10.2)
CHLORIDE BLD-SCNC: 101 MMOL/L (ref 98–107)
CO2: 21 MMOL/L (ref 22–29)
CREAT SERPL-MCNC: 0.5 MG/DL (ref 0.5–1)
GFR AFRICAN AMERICAN: >60
GFR NON-AFRICAN AMERICAN: >60 ML/MIN/1.73
GLUCOSE BLD-MCNC: 83 MG/DL (ref 74–99)
HCT VFR BLD CALC: 38.3 % (ref 34–48)
HEMOGLOBIN: 12.7 G/DL (ref 11.5–15.5)
MCH RBC QN AUTO: 30.3 PG (ref 26–35)
MCHC RBC AUTO-ENTMCNC: 33.2 % (ref 32–34.5)
MCV RBC AUTO: 91.4 FL (ref 80–99.9)
PDW BLD-RTO: 11.9 FL (ref 11.5–15)
PLATELET # BLD: 283 E9/L (ref 130–450)
PMV BLD AUTO: 9.7 FL (ref 7–12)
POTASSIUM SERPL-SCNC: 3.4 MMOL/L (ref 3.5–5)
RBC # BLD: 4.19 E12/L (ref 3.5–5.5)
REASON FOR REJECTION: NORMAL
REJECTED TEST: NORMAL
SODIUM BLD-SCNC: 138 MMOL/L (ref 132–146)
TOTAL PROTEIN: 6.2 G/DL (ref 6.4–8.3)
WBC # BLD: 8.4 E9/L (ref 4.5–11.5)

## 2020-07-11 PROCEDURE — 36415 COLL VENOUS BLD VENIPUNCTURE: CPT

## 2020-07-11 PROCEDURE — 6370000000 HC RX 637 (ALT 250 FOR IP): Performed by: INTERNAL MEDICINE

## 2020-07-11 PROCEDURE — 99233 SBSQ HOSP IP/OBS HIGH 50: CPT | Performed by: INTERNAL MEDICINE

## 2020-07-11 PROCEDURE — 82705 FATS/LIPIDS FECES QUAL: CPT

## 2020-07-11 PROCEDURE — 87324 CLOSTRIDIUM AG IA: CPT

## 2020-07-11 PROCEDURE — 6360000002 HC RX W HCPCS: Performed by: CLINICAL NURSE SPECIALIST

## 2020-07-11 PROCEDURE — 80053 COMPREHEN METABOLIC PANEL: CPT

## 2020-07-11 PROCEDURE — 83520 IMMUNOASSAY QUANT NOS NONAB: CPT

## 2020-07-11 PROCEDURE — 87077 CULTURE AEROBIC IDENTIFY: CPT

## 2020-07-11 PROCEDURE — 2580000003 HC RX 258: Performed by: INTERNAL MEDICINE

## 2020-07-11 PROCEDURE — 87449 NOS EACH ORGANISM AG IA: CPT

## 2020-07-11 PROCEDURE — 85027 COMPLETE CBC AUTOMATED: CPT

## 2020-07-11 PROCEDURE — 2060000000 HC ICU INTERMEDIATE R&B

## 2020-07-11 PROCEDURE — 6370000000 HC RX 637 (ALT 250 FOR IP): Performed by: CLINICAL NURSE SPECIALIST

## 2020-07-11 PROCEDURE — 6360000002 HC RX W HCPCS: Performed by: INTERNAL MEDICINE

## 2020-07-11 PROCEDURE — 87329 GIARDIA AG IA: CPT

## 2020-07-11 PROCEDURE — 87045 FECES CULTURE AEROBIC BACT: CPT

## 2020-07-11 RX ORDER — METOCLOPRAMIDE HYDROCHLORIDE 5 MG/ML
5 INJECTION INTRAMUSCULAR; INTRAVENOUS
Status: DISCONTINUED | OUTPATIENT
Start: 2020-07-11 | End: 2020-07-15 | Stop reason: HOSPADM

## 2020-07-11 RX ADMIN — SODIUM CHLORIDE, PRESERVATIVE FREE 10 ML: 5 INJECTION INTRAVENOUS at 07:39

## 2020-07-11 RX ADMIN — PROMETHAZINE HYDROCHLORIDE 6.25 MG: 25 INJECTION, SOLUTION INTRAMUSCULAR; INTRAVENOUS at 05:35

## 2020-07-11 RX ADMIN — CHOLESTYRAMINE 4 G: 4 POWDER, FOR SUSPENSION ORAL at 21:05

## 2020-07-11 RX ADMIN — PROMETHAZINE HYDROCHLORIDE 6.25 MG: 25 INJECTION, SOLUTION INTRAMUSCULAR; INTRAVENOUS at 17:48

## 2020-07-11 RX ADMIN — METOCLOPRAMIDE 5 MG: 5 INJECTION, SOLUTION INTRAMUSCULAR; INTRAVENOUS at 16:20

## 2020-07-11 RX ADMIN — METOCLOPRAMIDE 5 MG: 5 INJECTION, SOLUTION INTRAMUSCULAR; INTRAVENOUS at 10:00

## 2020-07-11 RX ADMIN — PROCHLORPERAZINE EDISYLATE 10 MG: 5 INJECTION INTRAMUSCULAR; INTRAVENOUS at 11:39

## 2020-07-11 RX ADMIN — METOPROLOL SUCCINATE 100 MG: 100 TABLET, EXTENDED RELEASE ORAL at 11:38

## 2020-07-11 NOTE — PROGRESS NOTES
CREATININE 0.5 0.5 0.5   GLUCOSE 112* 84 83   CALCIUM 8.5* 8.3* 8.2*       Recent Labs     07/09/20  0240 07/10/20  0415   WBC 6.9 7.2   RBC 4.40 4.11   HGB 13.1 12.2   HCT 40.3 38.1   MCV 91.6 92.7   MCH 29.8 29.7   MCHC 32.5 32.0   RDW 11.9 12.2    254   MPV 9.7 9.7           Assessment:    Active Problems:    COVID-19    Pneumonia due to COVID-19 virus    Dehydration    Hypokalemia    Rheumatoid arthritis (HCC)  Resolved Problems:    * No resolved hospital problems. *      Plan:  1. Acute COVID 19 pneumonia evident on the CT with positive test, no hypoxia, no indication for Decadron or Remdesivir. 2.  Nausea and vomiting, most likely due to viral infection, continue on Compazine as per patient worked better than other antiemetic agents. Not improving, appreciate GI input, Scarlett Oyster was stopped, continue on Compazine and Phenergan also started  3. History of asthma, not in exacerbation, continue home inhalers. NOTE: This report was transcribed using voice recognition software. Every effort was made to ensure accuracy; however, inadvertent computerized transcription errors may be present.   Electronically signed by Kristin Clemente MD on 7/11/2020 at 11:38 AM

## 2020-07-11 NOTE — PROGRESS NOTES
[facility]  [address] [facility], [address]  [facility], 17 [facility]      Pulmonary Progress Note      SUBJECTIVE:  Currently on RA, feels similar with respiratory function with minimal cough and not much sputum production. Claims ambulating a bit inside room and tolerating.     OBJECTIVE    Medications    Continuous Infusions:   sodium chloride 75 mL/hr at 07/10/20 1710       Scheduled Meds:   metoclopramide  5 mg Intravenous TID AC    cholestyramine  1 packet Oral BID    cetirizine  10 mg Oral Daily    hydroxychloroquine  400 mg Oral BID    metoprolol succinate  100 mg Oral Daily    montelukast  10 mg Oral Nightly    pantoprazole  40 mg Oral Daily    predniSONE  5 mg Oral Daily    sulfaSALAzine  1,000 mg Oral BID    sodium chloride flush  10 mL Intravenous 2 times per day    enoxaparin  40 mg Subcutaneous Daily       PRN Meds:promethazine, prochlorperazine, sodium chloride flush, acetaminophen **OR** acetaminophen, polyethylene glycol    Physical    VITALS:  BP (!) 160/80   Pulse 81   Temp 97.6 °F (36.4 °C) (Temporal)   Resp 18   Ht 5' 10\" (1.778 m)   Wt 270 lb 12.8 oz (122.8 kg)   SpO2 94%   BMI 38.86 kg/m²     24HR INTAKE/OUTPUT:      Intake/Output Summary (Last 24 hours) at 2020 1233  Last data filed at 2020 0538  Gross per 24 hour   Intake 2587 ml   Output 700 ml   Net 1887 ml       24HR PULSE OXIMETRY RANGE:    SpO2  Av.7 %  Min: 92 %  Max: 94 %    General appearance: alert, appears stated age and cooperative  Lungs: rhonchi bilaterally with cough  Heart: regular rate and rhythm, S1, S2 normal, no murmur, click, rub or gallop  Abdomen: soft, non-tender; bowel sounds normal; no masses,  no organomegaly  Extremities: extremities normal, atraumatic, no cyanosis or edema  Neurologic: Mental status: Alert, oriented, thought content appropriate    Data    CBC:   Recent Labs     20  0240 07/10/20  0415   WBC 6.9 7.2

## 2020-07-11 NOTE — PROGRESS NOTES
[address] [place] [address] [facility]  Progress Note    SUBJECTIVE:  Chief Complaint   Patient presents with    Emesis    Fever    Other     covid+ 20     O2 sat 92% on room air. No fevers overnight. States nausea better controlled with phenergan and compazine. One loose BM yesterday. + cough, clear mucus    Review of systems:  As stated above in the chief complaint, otherwise negative. Medications:  Scheduled Meds:   metoclopramide  5 mg Intravenous TID AC    cholestyramine  1 packet Oral BID    cetirizine  10 mg Oral Daily    hydroxychloroquine  400 mg Oral BID    metoprolol succinate  100 mg Oral Daily    montelukast  10 mg Oral Nightly    pantoprazole  40 mg Oral Daily    predniSONE  5 mg Oral Daily    sulfaSALAzine  1,000 mg Oral BID    sodium chloride flush  10 mL Intravenous 2 times per day    enoxaparin  40 mg Subcutaneous Daily     Continuous Infusions:   sodium chloride 75 mL/hr at 07/10/20 1710     PRN Meds:promethazine, prochlorperazine, sodium chloride flush, acetaminophen **OR** acetaminophen, polyethylene glycol    OBJECTIVE:  /67   Pulse 82   Temp 97.1 °F (36.2 °C) (Infrared)   Resp 18   Ht 5' 10\" (1.778 m)   Wt 270 lb 12.8 oz (122.8 kg)   SpO2 92%   BMI 38.86 kg/m²   Temp  Av.7 °F (36.5 °C)  Min: 97.1 °F (36.2 °C)  Max: 98.3 °F (36.8 °C)  Constitutional: The patient is awake, alert, and oriented. Sitting up in bed in NAD  Skin: Warm and dry. No rashes were noted. HEENT: Round and reactive pupils. Moist mucous membranes. No ulcerations or thrush. Neck: Supple to movements. Chest: No use of accessory muscles to breathe. Symmetrical expansion. No wheezing, crackles or rhonchi. Cardiovascular: S1 and S2 are rhythmic and regular. No murmurs appreciated. Abdomen: Positive bowel sounds to auscultation. Benign to palpation. Extremities: No clubbing, no cyanosis, no edema.   Lines: peripheral    Laboratory and Tests Review:  Lab Results Component Value Date    WBC 7.2 07/10/2020    WBC 6.9 07/09/2020    WBC 4.9 07/08/2020    HGB 12.2 07/10/2020    HCT 38.1 07/10/2020    MCV 92.7 07/10/2020     07/10/2020     Lab Results   Component Value Date    NEUTROABS 5.48 07/10/2020    NEUTROABS 5.62 07/09/2020    NEUTROABS 3.78 07/08/2020     No results found for: [facility]  Lab Results   Component Value Date    ALT 41 (H) 07/11/2020    AST 43 (H) 07/11/2020    ALKPHOS 61 07/11/2020    BILITOT 0.7 07/11/2020     Lab Results   Component Value Date     07/11/2020    K 3.4 07/11/2020    K 3.3 07/07/2020     07/11/2020    CO2 21 07/11/2020    BUN 14 07/11/2020    CREATININE 0.5 07/11/2020    CREATININE 0.5 07/10/2020    CREATININE 0.5 07/09/2020    GFRAA >60 07/11/2020    LABGLOM >60 07/11/2020    GLUCOSE 83 07/11/2020    PROT 6.2 07/11/2020    LABALBU 2.7 07/11/2020    CALCIUM 8.2 07/11/2020    BILITOT 0.7 07/11/2020    ALKPHOS 61 07/11/2020    AST 43 07/11/2020    ALT 41 07/11/2020     Lab Results   Component Value Date    CRP 14.9 (H) 07/10/2020    CRP 10.3 (H) 07/08/2020    CRP 0.4 11/09/2019     Lab Results   Component Value Date    SEDRATE 46 (H) 07/10/2020    SEDRATE 41 (H) 07/08/2020    SEDRATE 5 11/09/2019     Radiology:      Microbiology:   SARS-CoV-2 7/7/2020: Detected    ASSESSMENT:  · SARS-CoV-2 infection  · Intractable nausea associated to the above, improving with Compazine  · Viral pneumonia associated to SARS-CoV-2. Not requiring oxygen but pulse oximetry is in the low 90s    PLAN:  · Continue supportive treatment  · If the pulse oximetry drops below 90 and the patient requires oxygen she will qualify for Remdesivir and dexamethasone  · Interestingly she was already on plaquenil  and 5 mg prednisone for RA    April [place] Daphne Bad  9:51 AM  7/11/2020      N/V can be the viral effect itself, radu RAMIREZ noted  GI has been consulted    Pt seen and examined. I discussed and co-formulated the plan with advanced practice nurse.  Labs, cultures, and radiographs reviewed. Face to Face encounter occurred. Changes made as necessary by me.      Angie Hines MD  Infectious Disease

## 2020-07-12 LAB
ALBUMIN SERPL-MCNC: 2.9 G/DL (ref 3.5–5.2)
ALP BLD-CCNC: 60 U/L (ref 35–104)
ALT SERPL-CCNC: 43 U/L (ref 0–32)
ANION GAP SERPL CALCULATED.3IONS-SCNC: 16 MMOL/L (ref 7–16)
AST SERPL-CCNC: 32 U/L (ref 0–31)
BASOPHILS ABSOLUTE: 0.03 E9/L (ref 0–0.2)
BASOPHILS RELATIVE PERCENT: 0.4 % (ref 0–2)
BILIRUB SERPL-MCNC: 0.7 MG/DL (ref 0–1.2)
BUN BLDV-MCNC: 11 MG/DL (ref 6–20)
C DIFF TOXIN/ANTIGEN: NORMAL
CALCIUM SERPL-MCNC: 8.3 MG/DL (ref 8.6–10.2)
CHLORIDE BLD-SCNC: 104 MMOL/L (ref 98–107)
CO2: 23 MMOL/L (ref 22–29)
CREAT SERPL-MCNC: 0.5 MG/DL (ref 0.5–1)
EOSINOPHILS ABSOLUTE: 0.04 E9/L (ref 0.05–0.5)
EOSINOPHILS RELATIVE PERCENT: 0.5 % (ref 0–6)
GFR AFRICAN AMERICAN: >60
GFR NON-AFRICAN AMERICAN: >60 ML/MIN/1.73
GLUCOSE BLD-MCNC: 90 MG/DL (ref 74–99)
HCT VFR BLD CALC: 37.2 % (ref 34–48)
HEMOGLOBIN: 12.1 G/DL (ref 11.5–15.5)
IMMATURE GRANULOCYTES #: 0.1 E9/L
IMMATURE GRANULOCYTES %: 1.2 % (ref 0–5)
LYMPHOCYTES ABSOLUTE: 0.88 E9/L (ref 1.5–4)
LYMPHOCYTES RELATIVE PERCENT: 10.6 % (ref 20–42)
MCH RBC QN AUTO: 30 PG (ref 26–35)
MCHC RBC AUTO-ENTMCNC: 32.5 % (ref 32–34.5)
MCV RBC AUTO: 92.1 FL (ref 80–99.9)
MONOCYTES ABSOLUTE: 0.6 E9/L (ref 0.1–0.95)
MONOCYTES RELATIVE PERCENT: 7.2 % (ref 2–12)
NEUTROPHILS ABSOLUTE: 6.65 E9/L (ref 1.8–7.3)
NEUTROPHILS RELATIVE PERCENT: 80.1 % (ref 43–80)
PDW BLD-RTO: 12.1 FL (ref 11.5–15)
PLATELET # BLD: 283 E9/L (ref 130–450)
PMV BLD AUTO: 9.8 FL (ref 7–12)
POTASSIUM SERPL-SCNC: 3 MMOL/L (ref 3.5–5)
RBC # BLD: 4.04 E12/L (ref 3.5–5.5)
SODIUM BLD-SCNC: 143 MMOL/L (ref 132–146)
TOTAL PROTEIN: 6.1 G/DL (ref 6.4–8.3)
WBC # BLD: 8.3 E9/L (ref 4.5–11.5)

## 2020-07-12 PROCEDURE — 6360000002 HC RX W HCPCS: Performed by: INTERNAL MEDICINE

## 2020-07-12 PROCEDURE — 80053 COMPREHEN METABOLIC PANEL: CPT

## 2020-07-12 PROCEDURE — 85025 COMPLETE CBC W/AUTO DIFF WBC: CPT

## 2020-07-12 PROCEDURE — 6360000002 HC RX W HCPCS: Performed by: CLINICAL NURSE SPECIALIST

## 2020-07-12 PROCEDURE — 36415 COLL VENOUS BLD VENIPUNCTURE: CPT

## 2020-07-12 PROCEDURE — 2060000000 HC ICU INTERMEDIATE R&B

## 2020-07-12 PROCEDURE — 99232 SBSQ HOSP IP/OBS MODERATE 35: CPT | Performed by: INTERNAL MEDICINE

## 2020-07-12 RX ORDER — POTASSIUM CHLORIDE 7.45 MG/ML
10 INJECTION INTRAVENOUS
Status: COMPLETED | OUTPATIENT
Start: 2020-07-12 | End: 2020-07-12

## 2020-07-12 RX ADMIN — POTASSIUM CHLORIDE 10 MEQ: 10 INJECTION, SOLUTION INTRAVENOUS at 15:00

## 2020-07-12 RX ADMIN — METOCLOPRAMIDE 5 MG: 5 INJECTION, SOLUTION INTRAMUSCULAR; INTRAVENOUS at 15:31

## 2020-07-12 RX ADMIN — METOCLOPRAMIDE 5 MG: 5 INJECTION, SOLUTION INTRAMUSCULAR; INTRAVENOUS at 12:56

## 2020-07-12 RX ADMIN — POTASSIUM CHLORIDE 10 MEQ: 10 INJECTION, SOLUTION INTRAVENOUS at 12:44

## 2020-07-12 RX ADMIN — POTASSIUM CHLORIDE 10 MEQ: 10 INJECTION, SOLUTION INTRAVENOUS at 14:02

## 2020-07-12 RX ADMIN — METOCLOPRAMIDE 5 MG: 5 INJECTION, SOLUTION INTRAMUSCULAR; INTRAVENOUS at 06:36

## 2020-07-12 RX ADMIN — POTASSIUM CHLORIDE 10 MEQ: 10 INJECTION, SOLUTION INTRAVENOUS at 16:00

## 2020-07-12 NOTE — PROGRESS NOTES
[address] [place] [address] [facility]  Progress Note    SUBJECTIVE:  Chief Complaint   Patient presents with    Emesis    Fever    Other     covid+ 20     O2 sat 92% on room air. No fevers overnight. States nausea better controlled with phenergan and compazine - states tolerating banana popsicles and apple juice. Emesis is primarily mucous. 3 loose BMs since yesterday. No fevers overnight. Review of systems:  As stated above in the chief complaint, otherwise negative. Medications:  Scheduled Meds:   metoclopramide  5 mg Intravenous TID AC    cholestyramine  1 packet Oral BID    cetirizine  10 mg Oral Daily    hydroxychloroquine  400 mg Oral BID    metoprolol succinate  100 mg Oral Daily    montelukast  10 mg Oral Nightly    pantoprazole  40 mg Oral Daily    predniSONE  5 mg Oral Daily    sulfaSALAzine  1,000 mg Oral BID    sodium chloride flush  10 mL Intravenous 2 times per day    enoxaparin  40 mg Subcutaneous Daily     Continuous Infusions:   sodium chloride 75 mL/hr at 07/10/20 1710     PRN Meds:promethazine, prochlorperazine, sodium chloride flush, acetaminophen **OR** acetaminophen, polyethylene glycol    OBJECTIVE:  BP (!) 155/67   Pulse 72   Temp 97.6 °F (36.4 °C) (Temporal)   Resp 16   Ht 5' 10\" (1.778 m)   Wt 278 lb 1.6 oz (126.1 kg)   SpO2 90%   BMI 39.90 kg/m²   Temp  Av.2 °F (36.8 °C)  Min: 97.6 °F (36.4 °C)  Max: 99.1 °F (37.3 °C)  Constitutional: The patient is awake, alert, and oriented. Sitting up in bed in NAD  Skin: Warm and dry. No rashes were noted. HEENT: Round and reactive pupils. Moist mucous membranes. No ulcerations or thrush. Neck: Supple to movements. Chest: No use of accessory muscles to breathe. Symmetrical expansion. No wheezing, crackles or rhonchi. Cardiovascular: S1 and S2 are rhythmic and regular. No murmurs appreciated. Abdomen: Positive bowel sounds to auscultation. Benign to palpation.    Extremities: No edema b/l LEs. LUE + edema and tenderness s/p 2 IV infiltrations  Lines: peripheral    Laboratory and Tests Review:  Lab Results   Component Value Date    WBC 8.3 07/12/2020    WBC 8.4 07/11/2020    WBC 7.2 07/10/2020    HGB 12.1 07/12/2020    HCT 37.2 07/12/2020    MCV 92.1 07/12/2020     07/12/2020     Lab Results   Component Value Date    NEUTROABS 6.65 07/12/2020    NEUTROABS 5.48 07/10/2020    NEUTROABS 5.62 07/09/2020     No results found for: [facility]  Lab Results   Component Value Date    ALT 43 (H) 07/12/2020    AST 32 (H) 07/12/2020    ALKPHOS 60 07/12/2020    BILITOT 0.7 07/12/2020     Lab Results   Component Value Date     07/12/2020    K 3.0 07/12/2020    K 3.3 07/07/2020     07/12/2020    CO2 23 07/12/2020    BUN 11 07/12/2020    CREATININE 0.5 07/12/2020    CREATININE 0.5 07/11/2020    CREATININE 0.5 07/10/2020    GFRAA >60 07/12/2020    LABGLOM >60 07/12/2020    GLUCOSE 90 07/12/2020    PROT 6.1 07/12/2020    LABALBU 2.9 07/12/2020    CALCIUM 8.3 07/12/2020    BILITOT 0.7 07/12/2020    ALKPHOS 60 07/12/2020    AST 32 07/12/2020    ALT 43 07/12/2020     Lab Results   Component Value Date    CRP 14.9 (H) 07/10/2020    CRP 10.3 (H) 07/08/2020    CRP 0.4 11/09/2019     Lab Results   Component Value Date    SEDRATE 46 (H) 07/10/2020    SEDRATE 41 (H) 07/08/2020    SEDRATE 5 11/09/2019     Radiology:      Microbiology:   SARS-CoV-2 7/7/2020: Detected  Stool cx pending  Cdiff pending    ASSESSMENT:  · SARS-CoV-2 infection  · Intractable nausea associated to the above, improving with Compazine  · Viral pneumonia associated to SARS-CoV-2. Not requiring oxygen but pulse oximetry is in the low 90s    PLAN:  · Continue supportive treatment  · If the pulse oximetry drops below 90 and the patient requires oxygen she will qualify for Remdesivir and dexamethasone  · Interestingly she was already on plaquenil  and 5 mg prednisone for RA    April [address] [facility],[address]  10:52 AM  7/12/2020       Pt seen and examined.  I discussed and co-formulated the plan with advanced practice nurse. Labs, cultures, and radiographs reviewed. Face to Face encounter occurred. Changes made as necessary by me.      Em Boss MD  Infectious Disease

## 2020-07-12 NOTE — PLAN OF CARE
Problem: Airway Clearance - Ineffective  Goal: Achieve or maintain patent airway  Outcome: Met This Shift     Problem: Gas Exchange - Impaired  Goal: Absence of hypoxia  Outcome: Met This Shift     Problem: Breathing Pattern - Ineffective  Goal: Ability to achieve and maintain a regular respiratory rate  Outcome: Met This Shift     Problem:  Body Temperature -  Risk of, Imbalanced  Goal: Ability to maintain a body temperature within defined limits  Outcome: Met This Shift

## 2020-07-12 NOTE — PROGRESS NOTES
[facility]  [address] [facility], [address]  [facility], 17 [facility]      Pulmonary Progress Note      SUBJECTIVE:  Still on RA, feels similar with respiratory function with minimal cough and not much sputum production, some diarrhea. Claims ambulating a bit inside room and tolerating.     OBJECTIVE    Medications    Continuous Infusions:   sodium chloride 75 mL/hr at 07/10/20 1710       Scheduled Meds:   metoclopramide  5 mg Intravenous TID AC    cholestyramine  1 packet Oral BID    cetirizine  10 mg Oral Daily    hydroxychloroquine  400 mg Oral BID    metoprolol succinate  100 mg Oral Daily    montelukast  10 mg Oral Nightly    pantoprazole  40 mg Oral Daily    predniSONE  5 mg Oral Daily    sulfaSALAzine  1,000 mg Oral BID    sodium chloride flush  10 mL Intravenous 2 times per day    enoxaparin  40 mg Subcutaneous Daily       PRN Meds:promethazine, prochlorperazine, sodium chloride flush, acetaminophen **OR** acetaminophen, polyethylene glycol    Physical    VITALS:  BP (!) 155/67   Pulse 72   Temp 97.6 °F (36.4 °C) (Temporal)   Resp 16   Ht 5' 10\" (1.778 m)   Wt 278 lb 1.6 oz (126.1 kg)   SpO2 90%   BMI 39.90 kg/m²     24HR INTAKE/OUTPUT:      Intake/Output Summary (Last 24 hours) at 2020 1111  Last data filed at 2020 0558  Gross per 24 hour   Intake 1414.28 ml   Output 1550 ml   Net -135.72 ml       24HR PULSE OXIMETRY RANGE:    SpO2  Av %  Min: 90 %  Max: 92 %    General appearance: alert, appears stated age and cooperative  Lungs: rhonchi bilaterally with cough  Heart: regular rate and rhythm, S1, S2 normal, no murmur, click, rub or gallop  Abdomen: soft, non-tender; bowel sounds normal; no masses,  no organomegaly  Extremities: extremities normal, atraumatic, no cyanosis or edema  Neurologic: Mental status: Alert, oriented, thought content appropriate    Data    CBC:   Recent Labs     07/10/20  0415 20  1505 07/12/20  0554   WBC 7.2 8.4 8.3   HGB 12.2 12.7 12.1   HCT 38.1 38.3 37.2   MCV 92.7 91.4 92.1    283 283       BMP:  Recent Labs     07/10/20  0415 07/11/20  0545 07/12/20  0554    138 143   K 3.3* 3.4* 3.0*    101 104   CO2 23 21* 23   BUN 17 14 11   CREATININE 0.5 0.5 0.5    ALB:3,BILIDIR:3,BILITOT:3,ALKPHOS:3)@    PT/INR: No results for input(s): PROTIME, INR in the last 72 hours. ABG:   No results for input(s): PH, PO2, PCO2, HCO3, BE, O2SAT, METHB, O2HB, COHB, O2CON, HHB, THB in the last 72 hours. Radiology/Other tests reviewed: none    Assessment:     Active Problems:    COVID-19    Pneumonia due to COVID-19 virus    Dehydration    Hypokalemia    Rheumatoid arthritis (HCC)  Resolved Problems:    * No resolved hospital problems. *      Plan:       1. On RA, watch oxygenation  2. Not on lung medications due to COVID, observe  3. OOB to chair, ambulate with assistance, observe oxygenation with ambulation  4. Has apparently been refusing most of her medications, no lovenox, prednisone, and plaquanil since admission and intermittent with other medications      Time at the bedside, reviewing labs and radiographs, reviewing notes and consultations, discussing with staff and family was more than 25 minutes. Thanks for letting us see this patient in consultation. Please contact us with any questions. Office [phone] or after hours through [employer], x [phone].

## 2020-07-12 NOTE — PROGRESS NOTES
PROGRESS NOTE    Patient Presents with/Seen in Consultation For      *intractable vomiting  CHIEF COMPLAINT:  Nausea, vomiting, diarrhea, fever, and COVID + 7/1/2020 and on admission    Subjective:     Tele-visit done with patient, patient in [facility] isolation. Patient states she has had nausea with vomiting x 2 of mucus stating she is coughing up mucus which gags her and she vomits it up. Pt reports diarrhea x 1 small bout and 1 large bout of watery brown. Pt states she is tolerating popsicles, ice chips, apple juice, and water. Pt willing to try soft diet. Review of Systems  Aside from what was mentioned in the PMH and HPI, essentially unremarkable, all others negative. Objective:     BP (!) 155/67   Pulse 72   Temp 97.6 °F (36.4 °C) (Temporal)   Resp 16   Ht 5' 10\" (1.778 m)   Wt 278 lb 1.6 oz (126.1 kg)   SpO2 90%   BMI 39.90 kg/m²     DEFERRED, tele-visit done, patient in [facility] isolation.     potassium chloride 10 mEq/100 mL IVPB (Peripheral Line), Q1H  metoclopramide (REGLAN) injection 5 mg, TID AC  promethazine (PHENERGAN) injection 6.25 mg, Q6H PRN  cholestyramine (QUESTRAN) packet 4 g, BID  prochlorperazine (COMPAZINE) injection 10 mg, Q6H PRN  0.9 % sodium chloride infusion, Continuous  cetirizine (ZYRTEC) tablet 10 mg, Daily  hydroxychloroquine (PLAQUENIL) tablet 400 mg, BID  metoprolol succinate (TOPROL XL) extended release tablet 100 mg, Daily  montelukast (SINGULAIR) tablet 10 mg, Nightly  pantoprazole (PROTONIX) tablet 40 mg, Daily  predniSONE (DELTASONE) tablet 5 mg, Daily  sulfaSALAzine (AZULFIDINE) tablet 1,000 mg, BID  sodium chloride flush 0.9 % injection 10 mL, 2 times per day  sodium chloride flush 0.9 % injection 10 mL, PRN  acetaminophen (TYLENOL) tablet 650 mg, Q6H PRN    Or  acetaminophen (TYLENOL) suppository 650 mg, Q6H PRN  polyethylene glycol (GLYCOLAX) packet 17 g, Daily PRN  enoxaparin (LOVENOX) injection 40 mg, Daily         Data Review  CBC:   Lab Results   Component Value Date    WBC 8.3 07/12/2020    RBC 4.04 07/12/2020    HGB 12.1 07/12/2020    HCT 37.2 07/12/2020    MCV 92.1 07/12/2020    MCH 30.0 07/12/2020    MCHC 32.5 07/12/2020    RDW 12.1 07/12/2020     07/12/2020    MPV 9.8 07/12/2020     CMP:    Lab Results   Component Value Date     07/12/2020    K 3.0 07/12/2020    K 3.3 07/07/2020     07/12/2020    CO2 23 07/12/2020    BUN 11 07/12/2020    CREATININE 0.5 07/12/2020    GFRAA >60 07/12/2020    LABGLOM >60 07/12/2020    GLUCOSE 90 07/12/2020    PROT 6.1 07/12/2020    LABALBU 2.9 07/12/2020    CALCIUM 8.3 07/12/2020    BILITOT 0.7 07/12/2020    ALKPHOS 60 07/12/2020    AST 32 07/12/2020    ALT 43 07/12/2020     Hepatic Function Panel:    Lab Results   Component Value Date    ALKPHOS 60 07/12/2020    ALT 43 07/12/2020    AST 32 07/12/2020    PROT 6.1 07/12/2020    BILITOT 0.7 07/12/2020    BILIDIR <0.2 08/09/2019    IBILI see below 08/09/2019    LABALBU 2.9 07/12/2020     No components found for: [facility]    Lab Results   Component Value Date    TRIG 46 11/09/2019       Lab Results   Component Value Date    HDL 81 11/09/2019       Lab Results   Component Value Date    LDLCALC 80 11/09/2019       Lab Results   Component Value Date    LABVLDL 9 11/09/2019      FERRITIN:    Lab Results   Component Value Date    FERRITIN 261 07/08/2020         Assessment:     Active Problems:  ? Intractable nausea and vomiting - improving  ? Diarrhea - improved  ? COVID-19 with pneumonia - defer to Pulm/ID  ? Asthma - defer to Pulm  ? RA on Plaquenil and Humira PTA - defer to PCP  ? Elevated transaminases dating back to 2/2020  ? Hypokalemia - defer to PCP    Plan:     ? Continue Phenergan IV to alt with Compazine  ? Continue Reglan IV 5 mg TID AC meals as ordered  ? Stool studies pending  ? Continue Cholestyramine as ordered    ? Will check with pulmonary regarding possible mucolytic due to mucus reportedly causing her nausea  ? Continue low fat diet  ?  IV fluids per PCP  ? Monitor CMP, CBC  ? Supportive care  ? Defer comorbidities to others   ? Continue to monitor    Note: This report was completed utilizing computer voice recognition software. Every effort has been made to ensure accuracy, however; inadvertent computerized transcription errors may be present. Discussed with Dr. Robin Siddiqui per Dr. Phi Benjamin [id number], FNP-BC 7/12/2020 1:34 PM For Dr. Britney Dennis. NAUSEA AND VOMITING IMPROVED AFTER REGLAN. DIARRHEA IS LESS.   VOMITING IS CAUSED MAINLY BECAUSE LARGER MUCUS DRAINAGE THAT PATIENT TRIES TO EXPECTORATE THEN GETS NAUSEA AND VOMITING. CALL PLACED TO PULMONARY FOR MUCOLYTIC AGENT,. WILL CONTINUE CURRENT THERAPY.

## 2020-07-12 NOTE — PROGRESS NOTES
pt refused most night medicines. Nurse explained each medicine for use and educated pt that medicines is very important for her. Pt stated understood it and refused again.

## 2020-07-12 NOTE — PROGRESS NOTES
[facility] Progress Note    Admitting Date and Time: 7/7/2020  5:30 AM  Admit Dx: [id number] [U07.1]  COVID-19 [U07.1]    Subjective:  Patient is being followed for COVID-19 [U07.1]  COVID-19 [U07.1]   Pt feels somewhat better, she vomited 3 times yesterday,      metoclopramide  5 mg Intravenous TID AC    cholestyramine  1 packet Oral BID    cetirizine  10 mg Oral Daily    hydroxychloroquine  400 mg Oral BID    metoprolol succinate  100 mg Oral Daily    montelukast  10 mg Oral Nightly    pantoprazole  40 mg Oral Daily    predniSONE  5 mg Oral Daily    sulfaSALAzine  1,000 mg Oral BID    sodium chloride flush  10 mL Intravenous 2 times per day    enoxaparin  40 mg Subcutaneous Daily     promethazine, 6.25 mg, Q6H PRN  prochlorperazine, 10 mg, Q6H PRN  sodium chloride flush, 10 mL, PRN  acetaminophen, 650 mg, Q6H PRN    Or  acetaminophen, 650 mg, Q6H PRN  polyethylene glycol, 17 g, Daily PRN         Objective:    BP (!) 155/67   Pulse 72   Temp 97.6 °F (36.4 °C) (Temporal)   Resp 16   Ht 5' 10\" (1.778 m)   Wt 278 lb 1.6 oz (126.1 kg)   SpO2 90%   BMI 39.90 kg/m²     General Appearance: alert and oriented to person, place and time  Skin: warm and dry  Head: normocephalic and atraumatic  Eyes: pupils equal, round, and reactive to light, extraocular eye movements intact, conjunctivae normal  Neck: neck supple and non tender without mass   Pulmonary/Chest: clear to auscultation bilaterally- no wheezes, rales or rhonchi, normal air movement, no respiratory distress  Cardiovascular: normal rate, normal S1 and S2 and no carotid bruits  Abdomen: soft, non-tender, non-distended, normal bowel sounds, no masses or organomegaly  Extremities: no cyanosis, no clubbing and no edema  Neurologic: no cranial nerve deficit and speech normal        Recent Labs     07/10/20  0415 07/11/20  0545 07/12/20  0554    138 143   K 3.3* 3.4* 3.0*    101 104   CO2 23 21* 23   BUN 17 14 11

## 2020-07-13 LAB
ALBUMIN SERPL-MCNC: 2.8 G/DL (ref 3.5–5.2)
ALP BLD-CCNC: 60 U/L (ref 35–104)
ALT SERPL-CCNC: 40 U/L (ref 0–32)
ANION GAP SERPL CALCULATED.3IONS-SCNC: 15 MMOL/L (ref 7–16)
AST SERPL-CCNC: 28 U/L (ref 0–31)
BILIRUB SERPL-MCNC: 0.8 MG/DL (ref 0–1.2)
BUN BLDV-MCNC: 9 MG/DL (ref 6–20)
CALCIUM SERPL-MCNC: 8.5 MG/DL (ref 8.6–10.2)
CHLORIDE BLD-SCNC: 102 MMOL/L (ref 98–107)
CO2: 23 MMOL/L (ref 22–29)
CREAT SERPL-MCNC: 0.5 MG/DL (ref 0.5–1)
CULTURE, STOOL: NORMAL
GFR AFRICAN AMERICAN: >60
GFR NON-AFRICAN AMERICAN: >60 ML/MIN/1.73
GIARDIA ANTIGEN STOOL: NORMAL
GLUCOSE BLD-MCNC: 88 MG/DL (ref 74–99)
LACTATE DEHYDROGENASE: 364 U/L (ref 135–214)
POTASSIUM SERPL-SCNC: 3.1 MMOL/L (ref 3.5–5)
SODIUM BLD-SCNC: 140 MMOL/L (ref 132–146)
TOTAL PROTEIN: 6.1 G/DL (ref 6.4–8.3)

## 2020-07-13 PROCEDURE — 99233 SBSQ HOSP IP/OBS HIGH 50: CPT | Performed by: INTERNAL MEDICINE

## 2020-07-13 PROCEDURE — 6370000000 HC RX 637 (ALT 250 FOR IP): Performed by: INTERNAL MEDICINE

## 2020-07-13 PROCEDURE — 6360000002 HC RX W HCPCS: Performed by: INTERNAL MEDICINE

## 2020-07-13 PROCEDURE — 2580000003 HC RX 258: Performed by: INTERNAL MEDICINE

## 2020-07-13 PROCEDURE — 2580000003 HC RX 258: Performed by: SPECIALIST

## 2020-07-13 PROCEDURE — 2060000000 HC ICU INTERMEDIATE R&B

## 2020-07-13 PROCEDURE — 83615 LACTATE (LD) (LDH) ENZYME: CPT

## 2020-07-13 PROCEDURE — 80053 COMPREHEN METABOLIC PANEL: CPT

## 2020-07-13 PROCEDURE — 6360000002 HC RX W HCPCS: Performed by: CLINICAL NURSE SPECIALIST

## 2020-07-13 PROCEDURE — 36415 COLL VENOUS BLD VENIPUNCTURE: CPT

## 2020-07-13 RX ORDER — POTASSIUM CHLORIDE 7.45 MG/ML
10 INJECTION INTRAVENOUS ONCE
Status: DISCONTINUED | OUTPATIENT
Start: 2020-07-13 | End: 2020-07-13 | Stop reason: SDUPTHER

## 2020-07-13 RX ORDER — 0.9 % SODIUM CHLORIDE 0.9 %
30 INTRAVENOUS SOLUTION INTRAVENOUS PRN
Status: DISCONTINUED | OUTPATIENT
Start: 2020-07-13 | End: 2020-07-15 | Stop reason: HOSPADM

## 2020-07-13 RX ORDER — DEXAMETHASONE SODIUM PHOSPHATE 4 MG/ML
6 INJECTION, SOLUTION INTRA-ARTICULAR; INTRALESIONAL; INTRAMUSCULAR; INTRAVENOUS; SOFT TISSUE DAILY
Status: DISCONTINUED | OUTPATIENT
Start: 2020-07-13 | End: 2020-07-15 | Stop reason: HOSPADM

## 2020-07-13 RX ORDER — POTASSIUM CHLORIDE 7.45 MG/ML
10 INJECTION INTRAVENOUS
Status: COMPLETED | OUTPATIENT
Start: 2020-07-13 | End: 2020-07-13

## 2020-07-13 RX ADMIN — POTASSIUM CHLORIDE 10 MEQ: 10 INJECTION, SOLUTION INTRAVENOUS at 12:01

## 2020-07-13 RX ADMIN — SODIUM CHLORIDE 200 MG: 9 INJECTION, SOLUTION INTRAVENOUS at 15:26

## 2020-07-13 RX ADMIN — METOCLOPRAMIDE 5 MG: 5 INJECTION, SOLUTION INTRAMUSCULAR; INTRAVENOUS at 15:26

## 2020-07-13 RX ADMIN — PANTOPRAZOLE SODIUM 40 MG: 40 TABLET, DELAYED RELEASE ORAL at 08:49

## 2020-07-13 RX ADMIN — METOPROLOL SUCCINATE 100 MG: 100 TABLET, EXTENDED RELEASE ORAL at 21:42

## 2020-07-13 RX ADMIN — METOCLOPRAMIDE 5 MG: 5 INJECTION, SOLUTION INTRAMUSCULAR; INTRAVENOUS at 10:55

## 2020-07-13 RX ADMIN — POTASSIUM CHLORIDE 10 MEQ: 10 INJECTION, SOLUTION INTRAVENOUS at 10:54

## 2020-07-13 RX ADMIN — POTASSIUM CHLORIDE 10 MEQ: 10 INJECTION, SOLUTION INTRAVENOUS at 10:00

## 2020-07-13 RX ADMIN — DEXAMETHASONE SODIUM PHOSPHATE 6 MG: 4 INJECTION, SOLUTION INTRA-ARTICULAR; INTRALESIONAL; INTRAMUSCULAR; INTRAVENOUS; SOFT TISSUE at 15:01

## 2020-07-13 RX ADMIN — PROCHLORPERAZINE EDISYLATE 10 MG: 5 INJECTION INTRAMUSCULAR; INTRAVENOUS at 21:20

## 2020-07-13 RX ADMIN — POTASSIUM CHLORIDE 10 MEQ: 10 INJECTION, SOLUTION INTRAVENOUS at 14:00

## 2020-07-13 RX ADMIN — SODIUM CHLORIDE, PRESERVATIVE FREE 10 ML: 5 INJECTION INTRAVENOUS at 08:38

## 2020-07-13 RX ADMIN — PROMETHAZINE HYDROCHLORIDE 6.25 MG: 25 INJECTION, SOLUTION INTRAMUSCULAR; INTRAVENOUS at 12:07

## 2020-07-13 RX ADMIN — METOCLOPRAMIDE 5 MG: 5 INJECTION, SOLUTION INTRAMUSCULAR; INTRAVENOUS at 06:16

## 2020-07-13 RX ADMIN — POTASSIUM CHLORIDE 10 MEQ: 10 INJECTION, SOLUTION INTRAVENOUS at 13:05

## 2020-07-13 RX ADMIN — METOPROLOL SUCCINATE 100 MG: 100 TABLET, EXTENDED RELEASE ORAL at 08:49

## 2020-07-13 NOTE — PROGRESS NOTES
[facility] Quality Flow/Interdisciplinary Rounds Progress Note        Quality Flow Rounds held on July 13, 2020    Disciplines Attending:  Bedside Nurse, ,  and Nursing Unit Leadership    Lj Almanzar was admitted on 7/7/2020  5:30 AM    Anticipated Discharge Date:  Expected Discharge Date: 07/10/20    Disposition:    Kody Score:  Kody Scale Score: 20    Readmission Risk              Risk of Unplanned Readmission:        23           Discussed patient goal for the day, patient clinical progression, and barriers to discharge. The following Goal(s) of the Day/Commitment(s) have been identified: Supplement potassium depletion-check discharge.       Brigitte Noonan  July 13, 2020

## 2020-07-13 NOTE — PROGRESS NOTES
Pharmacy Medication Review     Medication: Remdesivir     Date: 7/13  Physician: Franki Gross with ID regarding the use of Remdesivir in this patient. Per [facility] and [facility] Recommendations, patient meet's the following inclusion criteria:  1. Adults or pregnant women with proven COVID-19 as defined by a positive nasopharyngeal swab, tracheal aspirate or sputum SARS-CoV-2 PCR or a positive serology test [x]  2. Symptom onset < 10 days and admitted < 6 days [x]  3. Severe disease as defined by the Emergency Use Authorization (EUA) criteria to have one of the following:  o    SpO2 ? 94% on room air [x]  o Requiring supplemental oxygen >4L []  o Mechanical ventilation < 48 hours []    Per [facility] and [facility] Recommendations, patient has NONE of the following exclusion criteria [x]  1. Participation in any other clinical trial for an anti-viral  experimental treatment for COVID-19  2. Evidence of multi-organ failure   3. Mechanically ventilated ? 48 hours   4. Alanine Aminotransferase (ALT) or aspartate aminotransferase (AST) > 5 X upper limit of normal (ULN)   5. Creatinine clearance < 30 mL/min using the Cockcroft-Gault formula for participants ? 25years of age and/or on renal replacement therapy  Use of more than 1 vasopressor      Remdesivir ordered to begin today at 15:15  Patient/caregiver fact sheet sent to the patient with the initial dose.     Tia Simmonds, PharmD, [facility]  7/13/2020  3:00 PM  Ext: [phone]

## 2020-07-13 NOTE — PROGRESS NOTES
Pulmonary Progress Note    Admit Date: 2020  Hospital day                               PCP: Amos Sheikh DO    Chief Complaint (s):  Patient Active Problem List   Diagnosis    Arthritis    Asthma    Acute cystitis without hematuria    Viral URI    Bronchitis    Rheumatoid arthritis of multiple sites with negative rheumatoid factor [facility]    Tachycardia    Cardiomyopathy ([facility]    LBBB (left bundle branch block)    COVID-19    Pneumonia due to COVID-19 virus    Dehydration    Hypokalemia    Rheumatoid arthritis ([facility]       Subjective:  · Awake and alert this morning. Appears in reasonable spirits. Mild hypokalemia is noted. Oxygenation is marginal.    Vitals:  VITALS:  BP (!) 143/66   Pulse 76   Temp 97 °F (36.1 °C) (Infrared)   Resp 18   Ht 5' 10\" (1.778 m)   Wt 273 lb 9.6 oz (124.1 kg)   SpO2 91%   BMI 39.26 kg/m²     24HR INTAKE/OUTPUT:      Intake/Output Summary (Last 24 hours) at 2020 1155  Last data filed at 2020 9772  Gross per 24 hour   Intake 2185.34 ml   Output 1400 ml   Net 785.34 ml       24HR PULSE OXIMETRY RANGE:    SpO2  Av.7 %  Min: 90 %  Max: 91 %    Medications:  IV:   sodium chloride 75 mL/hr at 07/10/20 1710       Scheduled Meds:   potassium chloride  10 mEq Intravenous Q1H    metoclopramide  5 mg Intravenous TID AC    cholestyramine  1 packet Oral BID    cetirizine  10 mg Oral Daily    hydroxychloroquine  400 mg Oral BID    metoprolol succinate  100 mg Oral Daily    montelukast  10 mg Oral Nightly    pantoprazole  40 mg Oral Daily    predniSONE  5 mg Oral Daily    sulfaSALAzine  1,000 mg Oral BID    sodium chloride flush  10 mL Intravenous 2 times per day    enoxaparin  40 mg Subcutaneous Daily       Diet:   DIET LOW FIBER; Lactose Controlled     EXAM:  General: No distress. Asleep. Eyes: PERRL. No sclera icterus. No conjunctival injection. ENT: No discharge. Pharynx clear. Neck: Trachea midline. Normal thyroid.   Resp: No accessory muscle use. No rales. No wheezing. No rhonchi. CV: Regular rate. Regular rhythm. No murmur or rub. Abd: Non-tender. Non-distended. No masses. No organomegaly. Normal bowel sounds. Skin: Warm and dry. No nodule on exposed extremities. No rash on exposed extremities. Ext: No cyanosis, clubbing, edema  Lymph: No cervical LAD. No supraclavicular LAD. M/S: No cyanosis. No joint deformity. No clubbing. Neuro: Awake. Follows commands. Positive pupils/gag/corneals. Normal pain response. Results:  CBC:   Recent Labs     07/11/20  1505 07/12/20  0554   WBC 8.4 8.3   HGB 12.7 12.1   HCT 38.3 37.2   MCV 91.4 92.1    283     BMP:   Recent Labs     07/11/20  0545 07/12/20  0554 07/13/20  0509    143 140   K 3.4* 3.0* 3.1*    104 102   CO2 21* 23 23   BUN 14 11 9   CREATININE 0.5 0.5 0.5     LIVER PROFILE:   Recent Labs     07/11/20  0545 07/12/20  0554 07/13/20  0509   AST 43* 32* 28   ALT 41* 43* 40*   BILITOT 0.7 0.7 0.8   ALKPHOS 61 60 60     PT/INR: No results for input(s): PROTIME, INR in the last 72 hours. APTT: No results for input(s): APTT in the last 72 hours. Pathology:  1. N/A      Microbiology:  1. None    Recent ABG:   No results for input(s): PH, PO2, PCO2, HCO3, BE, O2SAT, METHB, O2HB, COHB, O2CON, HHB, THB in the last 72 hours. Recent Films:  XR ABDOMEN (KUB) (SINGLE AP VIEW)   Final Result   No significant abnormal findings. CTA PULMONARY W CONTRAST   Final Result   No central pulmonary embolism or aortic dissection. Multifocal patchy and groundglass opacities bilaterally with  moderate   mediastinal and hilar adenopathy and pleural effusion likely   multifocal pneumonia including Covid 19. XR CHEST PORTABLE   Final Result   Mild CHF without edema                         Assessment:  1. COVID-19 infection with pneumonia. At this point, there is no hypoxia therefore no indication for antivirals or Decadron  2.  Underlying asthma  3. Seronegative rheumatoid arthritis  4. Sleep apnea: Currently a moot point given that we do not know settings and the patient cannot be on positive airway pressure.        Plan:  1. Supportive care for now. If patient's SPO2 slipped below 89% and she requires oxygen, a combination of Decadron and Remsesivir are indicated. Continue vigilance         Time at the bedside, reviewing labs and radiographs, reviewing updated notes and consultations, discussing with staff and family was more than 35 minutes. Please note that voice recognition technology was used in the preparation of this note and make therefore it may contain inadvertent transcription errors. If the patient is a COVID 19 isolation patient, the above physical exam reflects that of the examining physician for the day. Guerrero Ron M.D., F.C.C.P.     [facility] [address] [facility], [address] [facility], [address], [facility]

## 2020-07-13 NOTE — PROGRESS NOTES
PROGRESS NOTE    Patient Presents with/Seen in Consultation For      *intractable vomiting  CHIEF COMPLAINT:  Nausea, vomiting, diarrhea, fever, and COVID + 7/1/2020 and on admission    Subjective:     Tele-visit done with patient, patient in [facility] isolation. States she is feeling better today. Denies any N/V this AM. Tolerated small amounts of breakfast this AM, \"don' taste good\". Reports to 1 emesis last night. Described as \"yellow phlegm\". Denies any hematemesis. No further diarrhea since yesterday. +flatus. States she hasn't required any breathing treatments today. POC reviewed with the patient, all questions answered. Review of Systems  Aside from what was mentioned in the PMH and HPI, essentially unremarkable, all others negative. Objective:     BP (!) 143/66   Pulse 76   Temp 97 °F (36.1 °C) (Infrared)   Resp 18   Ht 5' 10\" (1.778 m)   Wt 273 lb 9.6 oz (124.1 kg)   SpO2 91%   BMI 39.26 kg/m²     DEFERRED, tele-visit done, patient in [facility] isolation.     potassium chloride 10 mEq/100 mL IVPB (Peripheral Line), Q1H  metoclopramide (REGLAN) injection 5 mg, TID AC  promethazine (PHENERGAN) injection 6.25 mg, Q6H PRN  cholestyramine (QUESTRAN) packet 4 g, BID  prochlorperazine (COMPAZINE) injection 10 mg, Q6H PRN  0.9 % sodium chloride infusion, Continuous  cetirizine (ZYRTEC) tablet 10 mg, Daily  hydroxychloroquine (PLAQUENIL) tablet 400 mg, BID  metoprolol succinate (TOPROL XL) extended release tablet 100 mg, Daily  montelukast (SINGULAIR) tablet 10 mg, Nightly  pantoprazole (PROTONIX) tablet 40 mg, Daily  predniSONE (DELTASONE) tablet 5 mg, Daily  sulfaSALAzine (AZULFIDINE) tablet 1,000 mg, BID  sodium chloride flush 0.9 % injection 10 mL, 2 times per day  sodium chloride flush 0.9 % injection 10 mL, PRN  acetaminophen (TYLENOL) tablet 650 mg, Q6H PRN    Or  acetaminophen (TYLENOL) suppository 650 mg, Q6H PRN  polyethylene glycol (GLYCOLAX) packet 17 g, Daily PRN  enoxaparin (LOVENOX) injection 40 mg, Daily         Data Review  CBC:   Lab Results   Component Value Date    WBC 8.3 07/12/2020    RBC 4.04 07/12/2020    HGB 12.1 07/12/2020    HCT 37.2 07/12/2020    MCV 92.1 07/12/2020    MCH 30.0 07/12/2020    MCHC 32.5 07/12/2020    RDW 12.1 07/12/2020     07/12/2020    MPV 9.8 07/12/2020     CMP:    Lab Results   Component Value Date     07/13/2020    K 3.1 07/13/2020    K 3.3 07/07/2020     07/13/2020    CO2 23 07/13/2020    BUN 9 07/13/2020    CREATININE 0.5 07/13/2020    GFRAA >60 07/13/2020    LABGLOM >60 07/13/2020    GLUCOSE 88 07/13/2020    PROT 6.1 07/13/2020    LABALBU 2.8 07/13/2020    CALCIUM 8.5 07/13/2020    BILITOT 0.8 07/13/2020    ALKPHOS 60 07/13/2020    AST 28 07/13/2020    ALT 40 07/13/2020     Hepatic Function Panel:    Lab Results   Component Value Date    ALKPHOS 60 07/13/2020    ALT 40 07/13/2020    AST 28 07/13/2020    PROT 6.1 07/13/2020    BILITOT 0.8 07/13/2020    BILIDIR <0.2 08/09/2019    IBILI see below 08/09/2019    LABALBU 2.8 07/13/2020     No components found for: CHLPLat  Lab Results   Component Value Date    TRIG 46 11/09/2019   e    [address] 80 11/09/2019             Lab Results   Component Value Date    HDL 81 11/09/2019   e Lab Results   Component Value Date    LDLCALC 80 11/09/2019   Date    FERRITIN 261 07/08/2020         Assessment:     Active Problems:  ? Intractable nausea and vomiting - improving  ? Diarrhea - improved  ? COVID-19 with pneumonia - defer to Pulm/ID  ? Asthma - defer to Pulm  ? RA on Plaquenil and Humira PTA - defer to PCP  ? Elevated transaminases dating back to 2/2020  ? Hypokalemia - defer to PCP    Plan:     ? Continue Phenergan IV to alt with Compazine  ? OK for Phenergan suppository at DC, PRN nausea control  ? Continue Reglan IV 5 mg TID AC meals as ordered  ? Stool studies negative  ? Continue Cholestyramine as ordered    ? Continue low fat diet  ? Medical management per Primary Care  ? Monitor CMP, CBC  ?  Supportive care  ? Defer comorbidities to others   ?  DC per Primary Care; OK from GI POV    Discussed with Dr. Sweetie Garland per Dr. Fawad Cazares NP-C  7/13/2020 10:58 AM For Dr. Helio Barcenas

## 2020-07-13 NOTE — PROGRESS NOTES
[address] [place] [address] [facility]  Progress Note    SUBJECTIVE:  Chief Complaint   Patient presents with    Emesis    Fever    Other     covid+ 20     O2 sat 92% on room air. No fevers overnight. States nausea better controlled with phenergan and compazine - states tolerating banana popsicles and apple juice. Emesis is primarily mucous. 3 loose BMs since yesterday. No fevers overnight. Review of systems:  As stated above in the chief complaint, otherwise negative. Medications:  Scheduled Meds:   potassium chloride  10 mEq Intravenous Q1H    metoclopramide  5 mg Intravenous TID AC    cholestyramine  1 packet Oral BID    cetirizine  10 mg Oral Daily    hydroxychloroquine  400 mg Oral BID    metoprolol succinate  100 mg Oral Daily    montelukast  10 mg Oral Nightly    pantoprazole  40 mg Oral Daily    predniSONE  5 mg Oral Daily    sulfaSALAzine  1,000 mg Oral BID    sodium chloride flush  10 mL Intravenous 2 times per day    enoxaparin  40 mg Subcutaneous Daily     Continuous Infusions:   sodium chloride 75 mL/hr at 07/10/20 1710     PRN Meds:promethazine, prochlorperazine, sodium chloride flush, acetaminophen **OR** acetaminophen, polyethylene glycol    OBJECTIVE:  BP (!) 143/66   Pulse 76   Temp 97 °F (36.1 °C) (Infrared)   Resp 18   Ht 5' 10\" (1.778 m)   Wt 273 lb 9.6 oz (124.1 kg)   SpO2 91%   BMI 39.26 kg/m²   Temp  Av.9 °F (36.6 °C)  Min: 97 °F (36.1 °C)  Max: 98.4 °F (36.9 °C)  Constitutional: The patient is awake, alert, and oriented. Sitting up in bed in NAD  Skin: Warm and dry. No rashes were noted. HEENT: Round and reactive pupils. Moist mucous membranes. No ulcerations or thrush. Neck: Supple to movements. Chest: No use of accessory muscles to breathe. Symmetrical expansion. No wheezing, crackles or rhonchi. Cardiovascular: S1 and S2 are rhythmic and regular. No murmurs appreciated. Abdomen: Positive bowel sounds to auscultation.  Benign to palpation. Extremities: No edema b/l LEs. LUE + edema and tenderness s/p 2 IV infiltrations  Lines: peripheral    Laboratory and Tests Review:  Lab Results   Component Value Date    WBC 8.3 07/12/2020    WBC 8.4 07/11/2020    WBC 7.2 07/10/2020    HGB 12.1 07/12/2020    HCT 37.2 07/12/2020    MCV 92.1 07/12/2020     07/12/2020     Lab Results   Component Value Date    NEUTROABS 6.65 07/12/2020    NEUTROABS 5.48 07/10/2020    NEUTROABS 5.62 07/09/2020     No results found for: [facility]  Lab Results   Component Value Date    ALT 40 (H) 07/13/2020    AST 28 07/13/2020    ALKPHOS 60 07/13/2020    BILITOT 0.8 07/13/2020     Lab Results   Component Value Date     07/13/2020    K 3.1 07/13/2020    K 3.3 07/07/2020     07/13/2020    CO2 23 07/13/2020    BUN 9 07/13/2020    CREATININE 0.5 07/13/2020    CREATININE 0.5 07/12/2020    CREATININE 0.5 07/11/2020    GFRAA >60 07/13/2020    LABGLOM >60 07/13/2020    GLUCOSE 88 07/13/2020    PROT 6.1 07/13/2020    LABALBU 2.8 07/13/2020    CALCIUM 8.5 07/13/2020    BILITOT 0.8 07/13/2020    ALKPHOS 60 07/13/2020    AST 28 07/13/2020    ALT 40 07/13/2020     Lab Results   Component Value Date    CRP 14.9 (H) 07/10/2020    CRP 10.3 (H) 07/08/2020    CRP 0.4 11/09/2019     Lab Results   Component Value Date    SEDRATE 46 (H) 07/10/2020    SEDRATE 41 (H) 07/08/2020    SEDRATE 5 11/09/2019     Radiology:      Microbiology:   SARS-CoV-2 7/7/2020: Detected  Stool cx pending  Cdiff pending    ASSESSMENT:  · SARS-CoV-2 infection  · Intractable nausea associated to the above, improving with Compazine  · Viral pneumonia associated to SARS-CoV-2.   Not requiring oxygen but pulse oximetry is in the low 90s    PLAN:  · Continue supportive treatment  · Remdesivir and dexamethasone  · O2 sat continues to be in the 90s- feel very uncomfortable about discharging her  · Start REM    Minerva Nail  2:08 PM  7/13/2020

## 2020-07-14 LAB
ALBUMIN SERPL-MCNC: 3 G/DL (ref 3.5–5.2)
ALP BLD-CCNC: 57 U/L (ref 35–104)
ALT SERPL-CCNC: 36 U/L (ref 0–32)
ANION GAP SERPL CALCULATED.3IONS-SCNC: 18 MMOL/L (ref 7–16)
AST SERPL-CCNC: 24 U/L (ref 0–31)
BASOPHILS ABSOLUTE: 0.02 E9/L (ref 0–0.2)
BASOPHILS RELATIVE PERCENT: 0.3 % (ref 0–2)
BILIRUB SERPL-MCNC: 0.7 MG/DL (ref 0–1.2)
BUN BLDV-MCNC: 8 MG/DL (ref 6–20)
CALCIUM SERPL-MCNC: 8.3 MG/DL (ref 8.6–10.2)
CHLORIDE BLD-SCNC: 103 MMOL/L (ref 98–107)
CO2: 18 MMOL/L (ref 22–29)
CREAT SERPL-MCNC: 0.4 MG/DL (ref 0.5–1)
EOSINOPHILS ABSOLUTE: 0.01 E9/L (ref 0.05–0.5)
EOSINOPHILS RELATIVE PERCENT: 0.1 % (ref 0–6)
FECAL NEUTRAL FAT: NORMAL
FECAL SPLIT FATS: NORMAL
GFR AFRICAN AMERICAN: >60
GFR NON-AFRICAN AMERICAN: >60 ML/MIN/1.73
GLUCOSE BLD-MCNC: 92 MG/DL (ref 74–99)
HCT VFR BLD CALC: 37.2 % (ref 34–48)
HEMOGLOBIN: 12.6 G/DL (ref 11.5–15.5)
IMMATURE GRANULOCYTES #: 0.12 E9/L
IMMATURE GRANULOCYTES %: 1.6 % (ref 0–5)
LYMPHOCYTES ABSOLUTE: 0.61 E9/L (ref 1.5–4)
LYMPHOCYTES RELATIVE PERCENT: 8.3 % (ref 20–42)
MCH RBC QN AUTO: 30.4 PG (ref 26–35)
MCHC RBC AUTO-ENTMCNC: 33.9 % (ref 32–34.5)
MCV RBC AUTO: 89.9 FL (ref 80–99.9)
MONOCYTES ABSOLUTE: 0.5 E9/L (ref 0.1–0.95)
MONOCYTES RELATIVE PERCENT: 6.8 % (ref 2–12)
NEUTROPHILS ABSOLUTE: 6.1 E9/L (ref 1.8–7.3)
NEUTROPHILS RELATIVE PERCENT: 82.9 % (ref 43–80)
PDW BLD-RTO: 12 FL (ref 11.5–15)
PLATELET # BLD: 316 E9/L (ref 130–450)
PMV BLD AUTO: 10 FL (ref 7–12)
POTASSIUM SERPL-SCNC: 3.6 MMOL/L (ref 3.5–5)
RBC # BLD: 4.14 E12/L (ref 3.5–5.5)
SODIUM BLD-SCNC: 139 MMOL/L (ref 132–146)
TOTAL PROTEIN: 6.5 G/DL (ref 6.4–8.3)
WBC # BLD: 7.4 E9/L (ref 4.5–11.5)

## 2020-07-14 PROCEDURE — 6370000000 HC RX 637 (ALT 250 FOR IP): Performed by: INTERNAL MEDICINE

## 2020-07-14 PROCEDURE — 2060000000 HC ICU INTERMEDIATE R&B

## 2020-07-14 PROCEDURE — 85025 COMPLETE CBC W/AUTO DIFF WBC: CPT

## 2020-07-14 PROCEDURE — 6360000002 HC RX W HCPCS: Performed by: CLINICAL NURSE SPECIALIST

## 2020-07-14 PROCEDURE — 2580000003 HC RX 258: Performed by: INTERNAL MEDICINE

## 2020-07-14 PROCEDURE — 36415 COLL VENOUS BLD VENIPUNCTURE: CPT

## 2020-07-14 PROCEDURE — 80053 COMPREHEN METABOLIC PANEL: CPT

## 2020-07-14 PROCEDURE — 2580000003 HC RX 258: Performed by: SPECIALIST

## 2020-07-14 PROCEDURE — 6360000002 HC RX W HCPCS: Performed by: INTERNAL MEDICINE

## 2020-07-14 PROCEDURE — 99232 SBSQ HOSP IP/OBS MODERATE 35: CPT | Performed by: INTERNAL MEDICINE

## 2020-07-14 RX ADMIN — METOPROLOL SUCCINATE 100 MG: 100 TABLET, EXTENDED RELEASE ORAL at 09:13

## 2020-07-14 RX ADMIN — DEXAMETHASONE SODIUM PHOSPHATE 6 MG: 4 INJECTION, SOLUTION INTRA-ARTICULAR; INTRALESIONAL; INTRAMUSCULAR; INTRAVENOUS; SOFT TISSUE at 09:14

## 2020-07-14 RX ADMIN — SODIUM CHLORIDE, PRESERVATIVE FREE 10 ML: 5 INJECTION INTRAVENOUS at 20:35

## 2020-07-14 RX ADMIN — METOCLOPRAMIDE 5 MG: 5 INJECTION, SOLUTION INTRAMUSCULAR; INTRAVENOUS at 12:11

## 2020-07-14 RX ADMIN — MONTELUKAST SODIUM 10 MG: 10 TABLET, FILM COATED ORAL at 20:34

## 2020-07-14 RX ADMIN — SODIUM CHLORIDE, PRESERVATIVE FREE 10 ML: 5 INJECTION INTRAVENOUS at 17:44

## 2020-07-14 RX ADMIN — PANTOPRAZOLE SODIUM 40 MG: 40 TABLET, DELAYED RELEASE ORAL at 09:14

## 2020-07-14 RX ADMIN — METOCLOPRAMIDE 5 MG: 5 INJECTION, SOLUTION INTRAMUSCULAR; INTRAVENOUS at 07:21

## 2020-07-14 RX ADMIN — SODIUM CHLORIDE 100 MG: 0.9 INJECTION, SOLUTION INTRAVENOUS at 18:47

## 2020-07-14 RX ADMIN — SODIUM CHLORIDE, PRESERVATIVE FREE 10 ML: 5 INJECTION INTRAVENOUS at 09:14

## 2020-07-14 ASSESSMENT — PAIN SCALES - GENERAL: PAINLEVEL_OUTOF10: 0

## 2020-07-14 NOTE — PROGRESS NOTES
[facility] Progress Note    Admitting Date and Time: 7/7/2020  5:30 AM  Admit Dx: [id number] [U07.1]  COVID-19 [U07.1]    Subjective:  Patient is being followed for COVID-19 [U07.1]  COVID-19 [U07.1]   Pt feels somewhat better, she vomited 3 times yesterday,      dexamethasone  6 mg Intravenous Daily    remdesivir IVPB  100 mg Intravenous Q24H    metoclopramide  5 mg Intravenous TID AC    cholestyramine  1 packet Oral BID    cetirizine  10 mg Oral Daily    metoprolol succinate  100 mg Oral Daily    montelukast  10 mg Oral Nightly    pantoprazole  40 mg Oral Daily    sulfaSALAzine  1,000 mg Oral BID    sodium chloride flush  10 mL Intravenous 2 times per day    enoxaparin  40 mg Subcutaneous Daily     sodium chloride, 30 mL, PRN  promethazine, 6.25 mg, Q6H PRN  prochlorperazine, 10 mg, Q6H PRN  sodium chloride flush, 10 mL, PRN  acetaminophen, 650 mg, Q6H PRN    Or  acetaminophen, 650 mg, Q6H PRN  polyethylene glycol, 17 g, Daily PRN         Objective:    BP (!) 146/88   Pulse 83   Temp 98.7 °F (37.1 °C) (Infrared)   Resp 18   Ht 5' 10\" (1.778 m)   Wt 272 lb 12.8 oz (123.7 kg)   SpO2 95%   BMI 39.14 kg/m²     General Appearance: alert and oriented to person, place and time  Skin: warm and dry  Head: normocephalic and atraumatic  Eyes: pupils equal, round, and reactive to light, extraocular eye movements intact, conjunctivae normal  Neck: neck supple and non tender without mass   Pulmonary/Chest: clear to auscultation bilaterally- no wheezes, rales or rhonchi, normal air movement, no respiratory distress  Cardiovascular: normal rate, normal S1 and S2 and no carotid bruits  Abdomen: soft, non-tender, non-distended, normal bowel sounds, no masses or organomegaly  Extremities: no cyanosis, no clubbing and no edema  Neurologic: no cranial nerve deficit and speech normal        Recent Labs     07/12/20  0554 07/13/20  0509 07/14/20  0705    140 139   K 3.0* 3.1* 3.6    102 103   CO2 23 23 18*   BUN 11 9 8   CREATININE 0.5 0.5 0.4*   GLUCOSE 90 88 92   CALCIUM 8.3* 8.5* 8.3*       Recent Labs     07/11/20  1505 07/12/20  0554 07/14/20  0705   WBC 8.4 8.3 7.4   RBC 4.19 4.04 4.14   HGB 12.7 12.1 12.6   HCT 38.3 37.2 37.2   MCV 91.4 92.1 89.9   MCH 30.3 30.0 30.4   MCHC 33.2 32.5 33.9   RDW 11.9 12.1 12.0    283 316   MPV 9.7 9.8 10.0           Assessment:    Active Problems:    COVID-19    Pneumonia due to COVID-19 virus    Dehydration    Hypokalemia    Rheumatoid arthritis (HCC)  Resolved Problems:    * No resolved hospital problems. *      Plan:  1. Acute COVID 19 pneumonia evident on the CT with positive test, no hypoxia, no indication for Decadron or Remdesivir. Yet ID started her on both  2. Nausea and vomiting, most likely due to viral infection, continue on Compazine as per patient worked better than other antiemetic agents. Not improving, appreciate GI input, Charlotte Hurt was stopped, continue on Compazine and Phenergan also started  3. History of asthma, not in exacerbation, continue home inhalers. NOTE: This report was transcribed using voice recognition software. Every effort was made to ensure accuracy; however, inadvertent computerized transcription errors may be present.   Electronically signed by Kyara Qureshi MD on 7/14/2020 at 1:08 PM

## 2020-07-14 NOTE — PROGRESS NOTES
[facility] Progress Note    Admitting Date and Time: 7/7/2020  5:30 AM  Admit Dx: [id number] [U07.1]  COVID-19 [U07.1]    Subjective:  Patient is being followed for COVID-19 [U07.1]  COVID-19 [U07.1]   Pt feels somewhat better, no more vomiting     dexamethasone  6 mg Intravenous Daily    remdesivir IVPB  100 mg Intravenous Q24H    metoclopramide  5 mg Intravenous TID AC    cholestyramine  1 packet Oral BID    cetirizine  10 mg Oral Daily    metoprolol succinate  100 mg Oral Daily    montelukast  10 mg Oral Nightly    pantoprazole  40 mg Oral Daily    sulfaSALAzine  1,000 mg Oral BID    sodium chloride flush  10 mL Intravenous 2 times per day    enoxaparin  40 mg Subcutaneous Daily     sodium chloride, 30 mL, PRN  promethazine, 6.25 mg, Q6H PRN  prochlorperazine, 10 mg, Q6H PRN  sodium chloride flush, 10 mL, PRN  acetaminophen, 650 mg, Q6H PRN    Or  acetaminophen, 650 mg, Q6H PRN  polyethylene glycol, 17 g, Daily PRN         Objective:    BP (!) 146/88   Pulse 83   Temp 98.7 °F (37.1 °C) (Infrared)   Resp 18   Ht 5' 10\" (1.778 m)   Wt 272 lb 12.8 oz (123.7 kg)   SpO2 95%   BMI 39.14 kg/m²     General Appearance: alert and oriented to person, place and time  Skin: warm and dry  Head: normocephalic and atraumatic  Eyes: pupils equal, round, and reactive to light, extraocular eye movements intact, conjunctivae normal  Neck: neck supple and non tender without mass   Pulmonary/Chest: clear to auscultation bilaterally- no wheezes, rales or rhonchi, normal air movement, no respiratory distress  Cardiovascular: normal rate, normal S1 and S2 and no carotid bruits  Abdomen: soft, non-tender, non-distended, normal bowel sounds, no masses or organomegaly  Extremities: no cyanosis, no clubbing and no edema  Neurologic: no cranial nerve deficit and speech normal        Recent Labs     07/12/20  0554 07/13/20  0509 07/14/20  0705    140 139   K 3.0* 3.1* 3.6    102 103   CO2 23 23

## 2020-07-14 NOTE — PROGRESS NOTES
PROGRESS NOTE    Patient Presents with/Seen in Consultation For      *intractable vomiting  CHIEF COMPLAINT:  Nausea, vomiting, diarrhea, fever, and COVID + 7/1/2020 and on admission    Subjective:     Tele-visit done with patient, patient in [facility] isolation. States she is much improved from yesterday. Denies any N/V. 2 soft brown stools. States she was started on Remdesivir, and seems to be feeling better since this was started. POC reviewed with the patient, all questions answered. Review of Systems  Aside from what was mentioned in the PMH and HPI, essentially unremarkable, all others negative. Objective:     BP (!) 143/66   Pulse 76   Temp 97 °F (36.1 °C) (Infrared)   Resp 18   Ht 5' 10\" (1.778 m)   Wt 273 lb 9.6 oz (124.1 kg)   SpO2 91%   BMI 39.26 kg/m²     DEFERRED, tele-visit done, patient in [facility] isolation.     potassium chloride 10 mEq/100 mL IVPB (Peripheral Line), Q1H  metoclopramide (REGLAN) injection 5 mg, TID AC  promethazine (PHENERGAN) injection 6.25 mg, Q6H PRN  cholestyramine (QUESTRAN) packet 4 g, BID  prochlorperazine (COMPAZINE) injection 10 mg, Q6H PRN  0.9 % sodium chloride infusion, Continuous  cetirizine (ZYRTEC) tablet 10 mg, Daily  hydroxychloroquine (PLAQUENIL) tablet 400 mg, BID  metoprolol succinate (TOPROL XL) extended release tablet 100 mg, Daily  montelukast (SINGULAIR) tablet 10 mg, Nightly  pantoprazole (PROTONIX) tablet 40 mg, Daily  predniSONE (DELTASONE) tablet 5 mg, Daily  sulfaSALAzine (AZULFIDINE) tablet 1,000 mg, BID  sodium chloride flush 0.9 % injection 10 mL, 2 times per day  sodium chloride flush 0.9 % injection 10 mL, PRN  acetaminophen (TYLENOL) tablet 650 mg, Q6H PRN    Or  acetaminophen (TYLENOL) suppository 650 mg, Q6H PRN  polyethylene glycol (GLYCOLAX) packet 17 g, Daily PRN  enoxaparin (LOVENOX) injection 40 mg, Daily         Data Review  CBC:   Lab Results   Component Value Date    WBC 8.3 07/12/2020    RBC 4.04 07/12/2020    HGB 12.1 07/12/2020 HCT 37.2 07/12/2020    MCV 92.1 07/12/2020    MCH 30.0 07/12/2020    MCHC 32.5 07/12/2020    RDW 12.1 07/12/2020     07/12/2020    MPV 9.8 07/12/2020     CMP:    Lab Results   Component Value Date     07/13/2020    K 3.1 07/13/2020    K 3.3 07/07/2020     07/13/2020    CO2 23 07/13/2020    BUN 9 07/13/2020    CREATININE 0.5 07/13/2020    GFRAA >60 07/13/2020    LABGLOM >60 07/13/2020    GLUCOSE 88 07/13/2020    PROT 6.1 07/13/2020    LABALBU 2.8 07/13/2020    CALCIUM 8.5 07/13/2020    BILITOT 0.8 07/13/2020    ALKPHOS 60 07/13/2020    AST 28 07/13/2020    ALT 40 07/13/2020     Hepatic Function Panel:    Lab Results   Component Value Date    ALKPHOS 60 07/13/2020    ALT 40 07/13/2020    AST 28 07/13/2020    PROT 6.1 07/13/2020    BILITOT 0.8 07/13/2020    BILIDIR <0.2 08/09/2019    IBILI see below 08/09/2019    LABALBU 2.8 07/13/2020     No components found for: CHLPLat  Lab Results   Component Value Date    TRIG 46 11/09/2019   e    [address] 80 11/09/2019             Lab Results   Component Value Date    HDL 81 11/09/2019   e Lab Results   Component Value Date    LDLCALC 80 11/09/2019   Date    FERRITIN 261 07/08/2020         Assessment:     Active Problems:  ? Intractable nausea and vomiting - improving  ? Diarrhea - improved  ? COVID-19 with pneumonia - defer to Pulm/ID  ? Asthma - defer to Pulm  ? RA on Plaquenil and Humira PTA - defer to PCP  ? Elevated transaminases dating back to 2/2020  ? Hypokalemia - defer to PCP    Plan:     ? Continue Phenergan IV to alt with Compazine  ? OK for Phenergan suppository at DC, PRN nausea control  ? Continue Reglan IV 5 mg TID AC meals as ordered  ? ID following with the patient  ? Continue Cholestyramine as ordered    ? Continue low fat diet  ? Medical management per Primary Care  ? Monitor CMP, CBC  ? Supportive care   ?  DC per Primary Care; OK from GI POV    Discussed with Dr. Ernesto Miller per Dr. Crystal Xiao NP-C  7/14/2020 4:10 PM For  Gayathri Mckeon

## 2020-07-14 NOTE — PROGRESS NOTES
[facility] Quality Flow/Interdisciplinary Rounds Progress Note        Quality Flow Rounds held on July 14, 2020    Disciplines Attending:  Bedside Nurse, ,  and Nursing Unit Leadership    Landon Adler was admitted on 7/7/2020  5:30 AM    Anticipated Discharge Date:  Expected Discharge Date: 07/10/20    Disposition:    Kody Score:  Kody Scale Score: 19    Readmission Risk              Risk of Unplanned Readmission:        23           Discussed patient goal for the day, patient clinical progression, and barriers to discharge. The following Goal(s) of the Day/Commitment(s) have been identified:  Check ID plan.       Xiang Romo  July 14, 2020

## 2020-07-14 NOTE — PROGRESS NOTES
[address] [place] [address] [facility]  MAGGIE[facility]  Progress Note    SUBJECTIVE:  Chief Complaint   Patient presents with    Emesis    Fever    Other     covid+ 20     O2 sat 95% on room air. No fevers overnight. Feels really good today after 1 dose of REM  Clinically looks much better    Review of systems:  As stated above in the chief complaint, otherwise negative. Medications:  Scheduled Meds:   dexamethasone  6 mg Intravenous Daily    remdesivir IVPB  100 mg Intravenous Q24H    metoclopramide  5 mg Intravenous TID AC    cholestyramine  1 packet Oral BID    cetirizine  10 mg Oral Daily    metoprolol succinate  100 mg Oral Daily    montelukast  10 mg Oral Nightly    pantoprazole  40 mg Oral Daily    sulfaSALAzine  1,000 mg Oral BID    sodium chloride flush  10 mL Intravenous 2 times per day    enoxaparin  40 mg Subcutaneous Daily     Continuous Infusions:   sodium chloride 75 mL/hr at 07/10/20 1710     PRN Meds:sodium chloride, promethazine, prochlorperazine, sodium chloride flush, acetaminophen **OR** acetaminophen, polyethylene glycol    OBJECTIVE:  BP (!) 146/88   Pulse 83   Temp 98.7 °F (37.1 °C) (Infrared)   Resp 18   Ht 5' 10\" (1.778 m)   Wt 272 lb 12.8 oz (123.7 kg)   SpO2 95%   BMI 39.14 kg/m²   Temp  Av.3 °F (36.8 °C)  Min: 97.9 °F (36.6 °C)  Max: 98.7 °F (37.1 °C)  Constitutional: The patient is awake, alert, and oriented. Sitting up in bed in NAD  Skin: Warm and dry. No rashes were noted. HEENT: Round and reactive pupils. Moist mucous membranes. No ulcerations or thrush. Neck: Supple to movements. Chest: No use of accessory muscles to breathe. Symmetrical expansion. No wheezing, crackles or rhonchi. Cardiovascular: S1 and S2 are rhythmic and regular. No murmurs appreciated. Abdomen: Positive bowel sounds to auscultation. Benign to palpation. Extremities: No edema b/l LEs.  LUE + edema and tenderness s/p 2 IV infiltrations  Lines:

## 2020-07-14 NOTE — PLAN OF CARE
Problem: Inadequate oral food/beverage intake (NI-2.1)  Goal: Food and/or Nutrient Delivery  Modify Diet to General Diet. Will Start Ensure Clear ONS TID. If intake remains poor x next few days, would then rec to consider EN vs PN (pending GI function) d/t poor intake x ~7 days now since adm. Description: Individualized approach for food/nutrient provision.   Outcome: Met This Shift

## 2020-07-14 NOTE — CARE COORDINATION
Updated discharge plan of care. Pt continue on Remdesivir for couple more does per ID. Still no O2.  Plan is home, no needs-MJO

## 2020-07-14 NOTE — PROGRESS NOTES
Comprehensive Nutrition Assessment    Type and Reason for Visit:  Initial, RD Nutrition Re-Screen/LOS    Nutrition Recommendations/Plan: Modify Diet to General Diet. Will Start Ensure Clear ONS TID. If intake remains poor x next few days, would then rec to consider EN vs PN (pending GI function) d/t poor intake x ~7 days now since adm. Nutrition Assessment:  Pt adm w/ n/v/d and fever x ~2-3 days pta d/t COVID19+ per pt/EMR review. Pt is at risk d/t poor intake since adm 2/2 continued n/v 2/2 COVID19. Will Start Ensure Clear ONS to aid in PO intake. Malnutrition Assessment:  Malnutrition Status:  Insufficient data    Context:  Acute Illness     Findings of the 6 clinical characteristics of malnutrition:  Energy Intake:  1 - 75% or less of estimated energy requirements for 7 or more days  Weight Loss:  No significant weight loss     Body Fat Loss:  Unable to assess(2/2 COVID19 Iso)     Muscle Mass Loss:  Unable to assess    Fluid Accumulation:  No significant fluid accumulation     Strength:  Not Performed    Estimated Daily Nutrient Needs:  Energy (kcal):  5795-8407(MSJ x 1.2 SF per ABW); Weight Used for Energy Requirements:  Admission     Protein (g):  (1.3-1.5 gm/kg per IBW); Weight Used for Protein Requirements:  Ideal        Fluid (ml/day):  0678-6295(1 ml/kcal);  Weight Used for Fluid Requirements:  Current      Nutrition Related Findings:  A&O, dentition WNL, Abd/BS WDL, No Edema, +I/O's      Wounds:  None       Current Nutrition Therapies:    DIET LOW FIBER; Lactose Controlled    Anthropometric Measures:  · Height: 5' 10\" (177.8 cm)  · Current Body Weight: 272 lb (123.4 kg)(Actual 7/14)   · Admission Body Weight: 270 lb (122.5 kg)(standing scale 7/8)    · Usual Body Weight: 270 lb (122.5 kg)(bed 2/21/20 per EMR, pt confirms via phone)     · Ideal Body Weight: 150 lbs; 181.3 lbs   · BMI: 39  · Adjusted Body Weight:  ; No Adjustment   · Adjusted BMI:      · BMI Categories: Obese Class 2 (BMI 35.0 -39.9)       Nutrition Diagnosis:   · Inadequate oral intake related to altered GI function(2/2 COVID19+ w/ N/V) as evidenced by intake 0-25%, poor intake prior to admission, nausea, vomiting, diarrhea      Nutrition Interventions:   Food and/or Nutrient Delivery:  Modify Current Diet, Start Oral Nutrition Supplement(Modify Diet to General Diet. Will Start Ensure Clear ONS TID. If intake remains poor x next few days, would then rec to consider EN vs PN (pending GI function) d/t poor intake x ~7 days now since adm.)  Nutrition Education/Counseling:  Education initiated(ONS options/benefits)   Coordination of Nutrition Care:  Continued Inpatient Monitoring    Goals:  PO intake >50% of meals/ONS. Nutrition Monitoring and Evaluation:   Food/Nutrient Intake Outcomes:  Diet Advancement/Tolerance, Food and Nutrient Intake, Supplement Intake  Physical Signs/Symptoms Outcomes:  Biochemical Data, Diarrhea, GI Status, Nausea or Vomiting, Fluid Status or Edema, Skin, Weight     Discharge Planning:     Too soon to determine     Electronically signed by Anitha Barlow RD, LD on 7/14/20 at 3:25 PM EDT    Contact: ext [phone]

## 2020-07-14 NOTE — PROGRESS NOTES
Pulmonary Progress Note    Admit Date: 2020  Hospital day                               PCP: Abdi Coe DO    Chief Complaint (s):  Patient Active Problem List   Diagnosis    Arthritis    Asthma    Acute cystitis without hematuria    Viral URI    Bronchitis    Rheumatoid arthritis of multiple sites with negative rheumatoid factor [facility]    Tachycardia    Cardiomyopathy ([facility]    LBBB (left bundle branch block)    COVID-19    Pneumonia due to COVID-19 virus    Dehydration    Hypokalemia    Rheumatoid arthritis ([facility]       Subjective:  · Awake and alert, gives a thumbs up. Seen by infectious disease yesterday, now on Decadron and antiviral    Vitals:  VITALS:  BP (!) 146/88   Pulse 83   Temp 98.7 °F (37.1 °C) (Infrared)   Resp 18   Ht 5' 10\" (1.778 m)   Wt 272 lb 12.8 oz (123.7 kg)   SpO2 95%   BMI 39.14 kg/m²     24HR INTAKE/OUTPUT:      Intake/Output Summary (Last 24 hours) at 2020 1101  Last data filed at 2020 0914  Gross per 24 hour   Intake 909.6 ml   Output 1300 ml   Net -390.4 ml       24HR PULSE OXIMETRY RANGE:    SpO2  Av %  Min: 95 %  Max: 95 %    Medications:  IV:   sodium chloride 75 mL/hr at 07/10/20 1710       Scheduled Meds:   dexamethasone  6 mg Intravenous Daily    remdesivir IVPB  100 mg Intravenous Q24H    metoclopramide  5 mg Intravenous TID AC    cholestyramine  1 packet Oral BID    cetirizine  10 mg Oral Daily    metoprolol succinate  100 mg Oral Daily    montelukast  10 mg Oral Nightly    pantoprazole  40 mg Oral Daily    sulfaSALAzine  1,000 mg Oral BID    sodium chloride flush  10 mL Intravenous 2 times per day    enoxaparin  40 mg Subcutaneous Daily       Diet:   DIET LOW FIBER; Lactose Controlled     EXAM:  General: No distress. Asleep. Eyes: PERRL. No sclera icterus. No conjunctival injection. ENT: No discharge. Pharynx clear. Neck: Trachea midline. Normal thyroid. Resp: No accessory muscle use. No rales.  No wheezing. No rhonchi. CV: Regular rate. Regular rhythm. No murmur or rub. Abd: Non-tender. Non-distended. No masses. No organomegaly. Normal bowel sounds. Skin: Warm and dry. No nodule on exposed extremities. No rash on exposed extremities. Ext: No cyanosis, clubbing, edema  Lymph: No cervical LAD. No supraclavicular LAD. M/S: No cyanosis. No joint deformity. No clubbing. Neuro: Awake. Follows commands. Positive pupils/gag/corneals. Normal pain response. Results:  CBC:   Recent Labs     07/11/20  1505 07/12/20  0554 07/14/20  0705   WBC 8.4 8.3 7.4   HGB 12.7 12.1 12.6   HCT 38.3 37.2 37.2   MCV 91.4 92.1 89.9    283 316     BMP:   Recent Labs     07/12/20  0554 07/13/20  0509 07/14/20  0705    140 139   K 3.0* 3.1* 3.6    102 103   CO2 23 23 18*   BUN 11 9 8   CREATININE 0.5 0.5 0.4*     LIVER PROFILE:   Recent Labs     07/12/20  0554 07/13/20  0509 07/14/20  0705   AST 32* 28 24   ALT 43* 40* 36*   BILITOT 0.7 0.8 0.7   ALKPHOS 60 60 57     PT/INR: No results for input(s): PROTIME, INR in the last 72 hours. APTT: No results for input(s): APTT in the last 72 hours. Pathology:  1. N/A      Microbiology:  1. None    Recent ABG:   No results for input(s): PH, PO2, PCO2, HCO3, BE, O2SAT, METHB, O2HB, COHB, O2CON, HHB, THB in the last 72 hours. Recent Films:  XR ABDOMEN (KUB) (SINGLE AP VIEW)   Final Result   No significant abnormal findings. CTA PULMONARY W CONTRAST   Final Result   No central pulmonary embolism or aortic dissection. Multifocal patchy and groundglass opacities bilaterally with  moderate   mediastinal and hilar adenopathy and pleural effusion likely   multifocal pneumonia including Covid 19. XR CHEST PORTABLE   Final Result   Mild CHF without edema                         Assessment:  1. COVID-19 infection with pneumonia. At this point, there is no hypoxia therefore no indication for antivirals or Decadron  2.  Underlying asthma  3. Seronegative rheumatoid arthritis  4. Sleep apnea: Currently a moot point given that we do not know settings and the patient cannot be on positive airway pressure.        Plan:  1. Continue current care. This was discussed with infectious disease.         Time at the bedside, reviewing labs and radiographs, reviewing updated notes and consultations, discussing with staff and family was more than 35 minutes. Please note that voice recognition technology was used in the preparation of this note and make therefore it may contain inadvertent transcription errors. If the patient is a COVID 19 isolation patient, the above physical exam reflects that of the examining physician for the day. Daniela Blue M.D., F.C.C.P.     [facility], [address] [facility], [address] [place] [address]

## 2020-07-14 NOTE — PROGRESS NOTES
Patient refuses all oral medication except Metoprolol and Protonix due to issues with nausea. Denies nausea at this time will continue to reevaluate this.   Bautista Rojas RN

## 2020-07-14 NOTE — PROGRESS NOTES
Pt reported to this RN that dropped morning lopressor on floor, failed to let dayshift RN know. Due to B/P 177/93, this RN retrieved Lopressor from Med dispense and gave to pt and witnessed pt take it.

## 2020-07-15 VITALS
DIASTOLIC BLOOD PRESSURE: 69 MMHG | RESPIRATION RATE: 18 BRPM | WEIGHT: 272.8 LBS | HEIGHT: 70 IN | SYSTOLIC BLOOD PRESSURE: 120 MMHG | TEMPERATURE: 98.2 F | BODY MASS INDEX: 39.05 KG/M2 | OXYGEN SATURATION: 91 % | HEART RATE: 79 BPM

## 2020-07-15 LAB
ALBUMIN SERPL-MCNC: 2.8 G/DL (ref 3.5–5.2)
ALP BLD-CCNC: 49 U/L (ref 35–104)
ALT SERPL-CCNC: 28 U/L (ref 0–32)
ANION GAP SERPL CALCULATED.3IONS-SCNC: 14 MMOL/L (ref 7–16)
AST SERPL-CCNC: 16 U/L (ref 0–31)
BASOPHILS ABSOLUTE: 0.02 E9/L (ref 0–0.2)
BASOPHILS RELATIVE PERCENT: 0.3 % (ref 0–2)
BILIRUB SERPL-MCNC: 0.5 MG/DL (ref 0–1.2)
BUN BLDV-MCNC: 13 MG/DL (ref 6–20)
CALCIUM SERPL-MCNC: 8.1 MG/DL (ref 8.6–10.2)
CHLORIDE BLD-SCNC: 101 MMOL/L (ref 98–107)
CO2: 23 MMOL/L (ref 22–29)
CREAT SERPL-MCNC: 0.5 MG/DL (ref 0.5–1)
EOSINOPHILS ABSOLUTE: 0.11 E9/L (ref 0.05–0.5)
EOSINOPHILS RELATIVE PERCENT: 1.7 % (ref 0–6)
GFR AFRICAN AMERICAN: >60
GFR NON-AFRICAN AMERICAN: >60 ML/MIN/1.73
GLUCOSE BLD-MCNC: 96 MG/DL (ref 74–99)
HCT VFR BLD CALC: 34.9 % (ref 34–48)
HEMOGLOBIN: 11.9 G/DL (ref 11.5–15.5)
IMMATURE GRANULOCYTES #: 0.07 E9/L
IMMATURE GRANULOCYTES %: 1.1 % (ref 0–5)
LYMPHOCYTES ABSOLUTE: 1.12 E9/L (ref 1.5–4)
LYMPHOCYTES RELATIVE PERCENT: 17.3 % (ref 20–42)
Lab: NORMAL
MCH RBC QN AUTO: 30.5 PG (ref 26–35)
MCHC RBC AUTO-ENTMCNC: 34.1 % (ref 32–34.5)
MCV RBC AUTO: 89.5 FL (ref 80–99.9)
MONOCYTES ABSOLUTE: 0.66 E9/L (ref 0.1–0.95)
MONOCYTES RELATIVE PERCENT: 10.2 % (ref 2–12)
NEUTROPHILS ABSOLUTE: 4.51 E9/L (ref 1.8–7.3)
NEUTROPHILS RELATIVE PERCENT: 69.4 % (ref 43–80)
PDW BLD-RTO: 12 FL (ref 11.5–15)
PLATELET # BLD: 321 E9/L (ref 130–450)
PMV BLD AUTO: 9.9 FL (ref 7–12)
POTASSIUM SERPL-SCNC: 2.9 MMOL/L (ref 3.5–5)
POTASSIUM SERPL-SCNC: 3.6 MMOL/L (ref 3.5–5)
RBC # BLD: 3.9 E12/L (ref 3.5–5.5)
REPORT: NORMAL
SODIUM BLD-SCNC: 138 MMOL/L (ref 132–146)
THIS TEST SENT TO: NORMAL
TOTAL PROTEIN: 5.8 G/DL (ref 6.4–8.3)
WBC # BLD: 6.5 E9/L (ref 4.5–11.5)

## 2020-07-15 PROCEDURE — 6370000000 HC RX 637 (ALT 250 FOR IP): Performed by: INTERNAL MEDICINE

## 2020-07-15 PROCEDURE — 80053 COMPREHEN METABOLIC PANEL: CPT

## 2020-07-15 PROCEDURE — 36415 COLL VENOUS BLD VENIPUNCTURE: CPT

## 2020-07-15 PROCEDURE — 6360000002 HC RX W HCPCS: Performed by: INTERNAL MEDICINE

## 2020-07-15 PROCEDURE — 99239 HOSP IP/OBS DSCHRG MGMT >30: CPT | Performed by: INTERNAL MEDICINE

## 2020-07-15 PROCEDURE — 85025 COMPLETE CBC W/AUTO DIFF WBC: CPT

## 2020-07-15 PROCEDURE — 6360000002 HC RX W HCPCS: Performed by: CLINICAL NURSE SPECIALIST

## 2020-07-15 PROCEDURE — 2580000003 HC RX 258: Performed by: INTERNAL MEDICINE

## 2020-07-15 PROCEDURE — 84132 ASSAY OF SERUM POTASSIUM: CPT

## 2020-07-15 PROCEDURE — 2580000003 HC RX 258: Performed by: SPECIALIST

## 2020-07-15 RX ORDER — PROMETHAZINE HYDROCHLORIDE 25 MG/1
25 SUPPOSITORY RECTAL EVERY 6 HOURS PRN
Qty: 28 SUPPOSITORY | Refills: 0 | Status: SHIPPED | OUTPATIENT
Start: 2020-07-15 | End: 2020-07-22

## 2020-07-15 RX ORDER — DEXAMETHASONE 6 MG/1
6 TABLET ORAL
Qty: 5 TABLET | Refills: 0 | Status: SHIPPED | OUTPATIENT
Start: 2020-07-15 | End: 2020-07-20

## 2020-07-15 RX ORDER — POTASSIUM CHLORIDE 7.45 MG/ML
10 INJECTION INTRAVENOUS
Status: COMPLETED | OUTPATIENT
Start: 2020-07-15 | End: 2020-07-15

## 2020-07-15 RX ORDER — POTASSIUM CHLORIDE 20 MEQ/1
60 TABLET, EXTENDED RELEASE ORAL ONCE
Status: COMPLETED | OUTPATIENT
Start: 2020-07-15 | End: 2020-07-15

## 2020-07-15 RX ORDER — POTASSIUM CHLORIDE 20 MEQ/1
60 TABLET, EXTENDED RELEASE ORAL 2 TIMES DAILY WITH MEALS
Status: DISCONTINUED | OUTPATIENT
Start: 2020-07-15 | End: 2020-07-15

## 2020-07-15 RX ADMIN — METOCLOPRAMIDE 5 MG: 5 INJECTION, SOLUTION INTRAMUSCULAR; INTRAVENOUS at 09:00

## 2020-07-15 RX ADMIN — METOPROLOL SUCCINATE 100 MG: 100 TABLET, EXTENDED RELEASE ORAL at 09:00

## 2020-07-15 RX ADMIN — SODIUM CHLORIDE, PRESERVATIVE FREE 10 ML: 5 INJECTION INTRAVENOUS at 09:01

## 2020-07-15 RX ADMIN — PANTOPRAZOLE SODIUM 40 MG: 40 TABLET, DELAYED RELEASE ORAL at 09:00

## 2020-07-15 RX ADMIN — POTASSIUM CHLORIDE 10 MEQ: 10 INJECTION, SOLUTION INTRAVENOUS at 10:57

## 2020-07-15 RX ADMIN — DEXAMETHASONE SODIUM PHOSPHATE 6 MG: 4 INJECTION, SOLUTION INTRA-ARTICULAR; INTRALESIONAL; INTRAMUSCULAR; INTRAVENOUS; SOFT TISSUE at 09:00

## 2020-07-15 RX ADMIN — PROCHLORPERAZINE EDISYLATE 10 MG: 5 INJECTION INTRAMUSCULAR; INTRAVENOUS at 02:31

## 2020-07-15 RX ADMIN — SODIUM CHLORIDE 100 MG: 0.9 INJECTION, SOLUTION INTRAVENOUS at 13:00

## 2020-07-15 RX ADMIN — METOCLOPRAMIDE 5 MG: 5 INJECTION, SOLUTION INTRAMUSCULAR; INTRAVENOUS at 10:56

## 2020-07-15 RX ADMIN — POTASSIUM CHLORIDE 60 MEQ: 20 TABLET, EXTENDED RELEASE ORAL at 09:50

## 2020-07-15 RX ADMIN — POTASSIUM CHLORIDE 10 MEQ: 10 INJECTION, SOLUTION INTRAVENOUS at 08:59

## 2020-07-15 ASSESSMENT — PAIN SCALES - GENERAL: PAINLEVEL_OUTOF10: 0

## 2020-07-15 NOTE — PROGRESS NOTES
Pulmonary Progress Note    Admit Date: 2020  Hospital day                               PCP: Merna Natarajan DO    Chief Complaint (s):  Patient Active Problem List   Diagnosis    Arthritis    Asthma    Acute cystitis without hematuria    Viral URI    Bronchitis    Rheumatoid arthritis of multiple sites with negative rheumatoid factor ([facility]    Tachycardia    Cardiomyopathy ([facility]    LBBB (left bundle branch block)    COVID-19    Pneumonia due to COVID-19 virus    Dehydration    Hypokalemia    Rheumatoid arthritis ([facility]       Subjective:  · Awake and alert, seems to be doing substantially better. Vitals:  VITALS:  /69   Pulse 79   Temp 98.2 °F (36.8 °C) (Infrared)   Resp 18   Ht 5' 10\" (1.778 m)   Wt 272 lb 12.8 oz (123.7 kg)   SpO2 91%   BMI 39.14 kg/m²     24HR INTAKE/OUTPUT:      Intake/Output Summary (Last 24 hours) at 7/15/2020 1212  Last data filed at 2020 1847  Gross per 24 hour   Intake 480 ml   Output --   Net 480 ml       24HR PULSE OXIMETRY RANGE:    SpO2  Av.7 %  Min: 91 %  Max: 95 %    Medications:  IV:      Scheduled Meds:   dexamethasone  6 mg Intravenous Daily    remdesivir IVPB  100 mg Intravenous Q24H    metoclopramide  5 mg Intravenous TID AC    cetirizine  10 mg Oral Daily    metoprolol succinate  100 mg Oral Daily    montelukast  10 mg Oral Nightly    pantoprazole  40 mg Oral Daily    sulfaSALAzine  1,000 mg Oral BID    sodium chloride flush  10 mL Intravenous 2 times per day    enoxaparin  40 mg Subcutaneous Daily       Diet:   DIET LOW FIBER; Lactose Controlled  Dietary Nutrition Supplements: Clear Liquid Oral Supplement     EXAM:  General: No distress. Asleep. Eyes: PERRL. No sclera icterus. No conjunctival injection. ENT: No discharge. Pharynx clear. Neck: Trachea midline. Normal thyroid. Resp: No accessory muscle use. No rales. No wheezing. No rhonchi. CV: Regular rate. Regular rhythm. No murmur or rub.    Abd: Non-tender. Non-distended. No masses. No organomegaly. Normal bowel sounds. Skin: Warm and dry. No nodule on exposed extremities. No rash on exposed extremities. Ext: No cyanosis, clubbing, edema  Lymph: No cervical LAD. No supraclavicular LAD. M/S: No cyanosis. No joint deformity. No clubbing. Neuro: Awake. Follows commands. Positive pupils/gag/corneals. Normal pain response. Results:  CBC:   Recent Labs     07/14/20  0705 07/15/20  0235   WBC 7.4 6.5   HGB 12.6 11.9   HCT 37.2 34.9   MCV 89.9 89.5    321     BMP:   Recent Labs     07/13/20  0509 07/14/20  0705 07/15/20  0235    139 138   K 3.1* 3.6 2.9*    103 101   CO2 23 18* 23   BUN 9 8 13   CREATININE 0.5 0.4* 0.5     LIVER PROFILE:   Recent Labs     07/13/20  0509 07/14/20  0705 07/15/20  0235   AST 28 24 16   ALT 40* 36* 28   BILITOT 0.8 0.7 0.5   ALKPHOS 60 57 49     PT/INR: No results for input(s): PROTIME, INR in the last 72 hours. APTT: No results for input(s): APTT in the last 72 hours. Pathology:  1. N/A      Microbiology:  1. None    Recent ABG:   No results for input(s): PH, PO2, PCO2, HCO3, BE, O2SAT, METHB, O2HB, COHB, O2CON, HHB, THB in the last 72 hours. Recent Films:  XR ABDOMEN (KUB) (SINGLE AP VIEW)   Final Result   No significant abnormal findings. CTA PULMONARY W CONTRAST   Final Result   No central pulmonary embolism or aortic dissection. Multifocal patchy and groundglass opacities bilaterally with  moderate   mediastinal and hilar adenopathy and pleural effusion likely   multifocal pneumonia including Covid 19. XR CHEST PORTABLE   Final Result   Mild CHF without edema                         Assessment:  1. COVID-19 infection with pneumonia. At this point, there is no hypoxia therefore no indication for antivirals or Decadron  2. Underlying asthma  3. Seronegative rheumatoid arthritis  4.  Sleep apnea: Currently a moot point given that we do not know settings and the patient cannot be on positive airway pressure.        Plan:  1. Continue current care. This was discussed with infectious disease. 2. Repeat COVID soon         Time at the bedside, reviewing labs and radiographs, reviewing updated notes and consultations, discussing with staff and family was more than 35 minutes. Please note that voice recognition technology was used in the preparation of this note and make therefore it may contain inadvertent transcription errors. If the patient is a COVID 19 isolation patient, the above physical exam reflects that of the examining physician for the day. Yaz Melara M.D., F.C.C.P.     [facility], [address] [place] [address], [address] [place] [address]

## 2020-07-15 NOTE — PROGRESS NOTES
PROGRESS NOTE    Patient Presents with/Seen in Consultation For      *intractable vomiting  CHIEF COMPLAINT:  Nausea, vomiting, diarrhea, fever, and COVID + 7/1/2020 and on admission    Subjective:     Tele-visit done with patient, patient in [facility] isolation. States she continues to feel better. Tolerating all meals. Denies any N/V, or abdominal pain. Had 2 soft brown stools this AM. Denies any diarrhea. POC reviewed with the patient, all questions answered. Review of Systems  Aside from what was mentioned in the PMH and HPI, essentially unremarkable, all others negative. Objective:     BP (!) 143/66   Pulse 76   Temp 97 °F (36.1 °C) (Infrared)   Resp 18   Ht 5' 10\" (1.778 m)   Wt 273 lb 9.6 oz (124.1 kg)   SpO2 91%   BMI 39.26 kg/m²     DEFERRED, [facility]visit done[facility].     potassium chloride 10 mEq/100 mL IVPB (Peripheral Line), Q1H  metoclopramide (REGLAN) injection 5 mg, TID AC  promethazine (PHENERGAN) injection 6.25 mg, Q6H PRN  cholestyramine (QUESTRAN) packet 4 g, BID  prochlorperazine (COMPAZINE) injection 10 mg, Q6H PRN  0.9 % sodium chloride infusion, Continuous  cetirizine (ZYRTEC) tablet 10 mg, Daily  hydroxychloroquine (PLAQUENIL) tablet 400 mg, BID  metoprolol succinate (TOPROL XL) extended release tablet 100 mg, Daily  montelukast (SINGULAIR) tablet 10 mg, Nightly  pantoprazole (PROTONIX) tablet 40 mg, Daily  predniSONE (DELTASONE) tablet 5 mg, Daily  sulfaSALAzine (AZULFIDINE) tablet 1,000 mg, BID  sodium chloride flush 0.9 % injection 10 mL, 2 times per day  sodium chloride flush 0.9 % injection 10 mL, PRN  acetaminophen (TYLENOL) tablet 650 mg, Q6H PRN    Or  acetaminophen (TYLENOL) suppository 650 mg, Q6H PRN  polyethylene glycol (GLYCOLAX) packet 17 g, Daily PRN  enoxaparin (LOVENOX) injection 40 mg, Daily         Data Review  CBC:   Lab Results   Component Value Date    WBC 8.3 07/12/2020    RBC 4.04 07/12/2020    HGB 12.1 07/12/2020    HCT 37.2 07/12/2020 MCV 92.1 07/12/2020    MCH 30.0 07/12/2020    MCHC 32.5 07/12/2020    RDW 12.1 07/12/2020     07/12/2020    MPV 9.8 07/12/2020     CMP:    Lab Results   Component Value Date     07/13/2020    K 3.1 07/13/2020    K 3.3 07/07/2020     07/13/2020    CO2 23 07/13/2020    BUN 9 07/13/2020    CREATININE 0.5 07/13/2020    GFRAA >60 07/13/2020    LABGLOM >60 07/13/2020    GLUCOSE 88 07/13/2020    PROT 6.1 07/13/2020    LABALBU 2.8 07/13/2020    CALCIUM 8.5 07/13/2020    BILITOT 0.8 07/13/2020    ALKPHOS 60 07/13/2020    AST 28 07/13/2020    ALT 40 07/13/2020     Hepatic Function Panel:    Lab Results   Component Value Date    ALKPHOS 60 07/13/2020    ALT 40 07/13/2020    AST 28 07/13/2020    PROT 6.1 07/13/2020    BILITOT 0.8 07/13/2020    BILIDIR <0.2 08/09/2019    IBILI see below 08/09/2019    LABALBU 2.8 07/13/2020     No components found for: CHLPLat  Lab Results   Component Value Date    TRIG 46 11/09/2019   e    [address] 80 11/09/2019             Lab Results   Component Value Date    HDL 81 11/09/2019   e Lab Results   Component Value Date    LDLCALC 80 11/09/2019   Date    FERRITIN 261 07/08/2020         Assessment:     Active Problems:  ? Intractable nausea and vomiting - resolved  ? Diarrhea - resolved  ? COVID-19 with pneumonia - defer to Pulm/ID  ? Asthma - defer to Pulm  ? RA on Plaquenil and Humira PTA - defer to PCP  ? Elevated transaminases dating back to 2/2020- resolved  ? Hypokalemia - defer to PCP    Plan:     ? OK for Phenergan suppository at DC, PRN nausea control  ? Continue Reglan IV 5 mg TID AC meals as ordered  ? ID following with the patient  ? DC Cholestyramine, patient isn't taking   ? Continue low fat diet, lactose controlled   ? Medical management per Primary Care  ? Monitor CMP, CBC  ? Supportive care   ?  Will sign off; call again if needed    Discussed with Dr. Santiago Sep per Dr. Jeffrey Mora NP-C  7/15/2020 9:40 AM For Dr. Leonor Orosco

## 2020-07-15 NOTE — DISCHARGE SUMMARY
[facility] Physician Discharge Summary       Abdi Coe DO  [facility] HospitalakatKathy [facility] [address] RuDec[facility]-Park[facility] Campusjakub Coe DO  [address] [place] [address]  [phone]            Activity level: As tolerated     Dispo: home    Condition on discharge: Stable     Patient ID:  Luís Lewis  [id number]  18 y.o.  1965    Admit date: 7/7/2020    Discharge date and time:  7/15/2020  2:56 PM    Admission Diagnoses: Active Problems:    COVID-19    Pneumonia due to COVID-19 virus    Dehydration    Hypokalemia    Rheumatoid arthritis ([facility] [address]  Resolved Problems:    * No resolved hospital problems. *      Discharge Diagnoses: Active Problems:    COVID-19    Pneumonia due to COVID-19 virus    Dehydration    Hypokalemia    Rheumatoid arthritis ([facility] [address]  Resolved Problems:    * No resolved hospital problems. *      Consults:  IP CONSULT TO PULMONOLOGY  IP CONSULT TO INFECTIOUS DISEASES  IP CONSULT TO GI  IP CONSULT TO IV TEAM  IP CONSULT TO PHARMACY  IP CONSULT TO IV TEAM      Hospital Course:   Patient Luís Lewis is a 63 y.o. presented with nausea, vomiting found to have  COVID-19 [U07.1]  COVID-19 [U07.1] infection    1. Acute COVID 19 pneumonia evident on the CT with positive test, on decadrone and remdesivir per ID, finished treatment. 2.  Nausea and vomiting, most likely due to viral infection, continue on Compazine as per patient worked better than other antiemetic agents. Not improving, appreciate GI input, Marlyse Shorts was stopped, treated with on Compazine and Phenergan with improvement of her symptoms, patient was able to tolerate diet. We will discharge the patient on phenergan supp  3. History of asthma, not in exacerbation, continue home inhalers.     Discharge Exam:    General Appearance: alert and oriented to person, place and time and in no acute distress  Skin: warm and dry  Head: normocephalic and atraumatic  Eyes: pupils equal, round, and reactive to light, extraocular eye movements intact, conjunctivae normal  Neck: neck supple and non tender without mass   Pulmonary/Chest: clear to auscultation bilaterally- no wheezes, rales or rhonchi, normal air movement, no respiratory distress  Cardiovascular: normal rate, normal S1 and S2 and no carotid bruits  Abdomen: soft, non-tender, non-distended, normal bowel sounds, no masses or organomegaly  Extremities: no cyanosis, no clubbing and no edema  Neurologic: no cranial nerve deficit and speech normal    I/O last 3 completed shifts: In: 1629.6 [P.O.:720; I.V.:909.6]  Out: 850 [Urine:850]  No intake/output data recorded. LABS:  Recent Labs     20  0509 20  0705 07/15/20  0235 07/15/20  1415    139 138  --    K 3.1* 3.6 2.9* 3.6    103 101  --    CO2 23 18* 23  --    BUN 9 8 13  --    CREATININE 0.5 0.4* 0.5  --    GLUCOSE 88 92 96  --    CALCIUM 8.5* 8.3* 8.1*  --        Recent Labs     20  0705 07/15/20  023   WBC 7.4 6.5   RBC 4.14 3.90   HGB 12.6 11.9   HCT 37.2 34.9   MCV 89.9 89.5   MCH 30.4 30.5   MCHC 33.9 34.1   RDW 12.0 12.0    321   MPV 10.0 9.9       No results for input(s): POCGLU in the last 72 hours. Imaging:  Xr Chest Portable    Result Date: 2020  Patient MRN:  [id number] : 1965 Age: 63 years Gender: Female Order Date:  2020 5:45 AM EXAM: XR CHEST PORTABLE one image INDICATION:  shortness of breath shortness of breath COMPARISON: 2020 FINDINGS: There is mild  cardiomegaly. There is vascular congestion with the perihilar and bibasilar atelectasis. Tiny left pleural effusion is suspected. There is no focal consolidation. Mild CHF without edema     Cta Pulmonary W Contrast    Result Date: 2020  Patient MRN:  [id number] : 1965 Age: 63 years Gender: Female Order Date:  2020 7:15 AM EXAM: CTA PULMONARY W CONTRAST number of images 500 including MIP coronal and sagittal reconstruction images.  Contrast. inhaler  Generic drug:  fluticasone-vilanterol  Inhale 1 puff into the lungs daily     cetirizine 10 MG tablet  Commonly known as:  ZYRTEC     furosemide 40 MG tablet  Commonly known as:  LASIX  Take 1 tablet by mouth daily     metoprolol succinate 100 MG extended release tablet  Commonly known as:  TOPROL XL  Take 1 tablet by mouth daily     montelukast 10 MG tablet  Commonly known as:  SINGULAIR  Take 1 tablet by mouth nightly     pantoprazole 40 MG tablet  Commonly known as:  PROTONIX  Take 1 tablet by mouth daily     potassium chloride 20 MEQ extended release tablet  Commonly known as:  KLOR-CON M  Take 2 tablets by mouth daily (with breakfast)     sulfaSALAzine 500 MG tablet  Commonly known as:  AZULFIDINE  Take 2 tablets by mouth 2 times daily     valsartan 80 MG tablet  Commonly known as:  DIOVAN  Take 1 tablet by mouth 2 times daily     vitamin D 50 MCG (2000 UT) Caps capsule         * This list has 2 medication(s) that are the same as other medications prescribed for you. Read the directions carefully, and ask your doctor or other care provider to review them with you.             STOP taking these medications    hydroxychloroquine 200 MG tablet  Commonly known as:  PLAQUENIL     predniSONE 5 MG tablet  Commonly known as:  Inocencio Osman           Where to Get Your Medications      These medications were sent to [address] [place] [address] 8, [address] [facility] [address]    Phone:  [phone]   · dexamethasone 6 MG tablet  · promethazine 25 MG suppository           Note that more than 30 minutes was spent in preparing discharge papers, discussing discharge with patient, medication review, etc.    Signed:  Electronically signed by Glen Pimentel MD on 7/15/2020 at 2:56 PM

## 2020-07-15 NOTE — PROGRESS NOTES
Per Dr. Charu Samuel may D/C after this dose of Remdesivir if ok with [id number] paged. Will contact Dr. Lian Mcfadden after 2pm K+ level drawn.

## 2020-07-15 NOTE — PROGRESS NOTES
[address] [place] [address] [facility]  MAGGIEIDA  Progress Note    SUBJECTIVE:  Chief Complaint   Patient presents with    Emesis    Fever    Other     covid+ 20     O2 sat 95% on room air. No fevers overnight. Feels really good today after 1 dose of REM  Clinically looks much better    Review of systems:  As stated above in the chief complaint, otherwise negative. Medications:  Scheduled Meds:   dexamethasone  6 mg Intravenous Daily    remdesivir IVPB  100 mg Intravenous Q24H    metoclopramide  5 mg Intravenous TID AC    cetirizine  10 mg Oral Daily    metoprolol succinate  100 mg Oral Daily    montelukast  10 mg Oral Nightly    pantoprazole  40 mg Oral Daily    sulfaSALAzine  1,000 mg Oral BID    sodium chloride flush  10 mL Intravenous 2 times per day    enoxaparin  40 mg Subcutaneous Daily     Continuous Infusions:    PRN Meds:sodium chloride, promethazine, prochlorperazine, sodium chloride flush, acetaminophen **OR** acetaminophen, polyethylene glycol    OBJECTIVE:  /69   Pulse 79   Temp 98.2 °F (36.8 °C) (Infrared)   Resp 18   Ht 5' 10\" (1.778 m)   Wt 272 lb 12.8 oz (123.7 kg)   SpO2 91%   BMI 39.14 kg/m²   Temp  Av.3 °F (36.3 °C)  Min: 96.6 °F (35.9 °C)  Max: 98.2 °F (36.8 °C)  Constitutional: The patient is awake, alert, and oriented. Sitting up in bed in NAD  Skin: Warm and dry. No rashes were noted. HEENT: Round and reactive pupils. Moist mucous membranes. No ulcerations or thrush. Neck: Supple to movements. Chest: No use of accessory muscles to breathe. Symmetrical expansion. No wheezing, crackles or rhonchi. Cardiovascular: S1 and S2 are rhythmic and regular. No murmurs appreciated. Abdomen: Positive bowel sounds to auscultation. Benign to palpation. Extremities: No edema b/l LEs.  LUE + edema and tenderness s/p 2 IV infiltrations  Lines: peripheral    Laboratory and Tests Review:  Lab Results   Component Value Date    WBC 6.5 07/15/2020    WBC 7.4

## 2020-07-15 NOTE — CARE COORDINATION
Social work / Discharge Plann[facility] Social work noted that patient is a possible discharge. Patient does not qualify for home 02.   Electronically signed by JOSEPH Agee on 7/15/2020 at 2:38 PM

## 2020-07-15 NOTE — PROGRESS NOTES
Pulse ox was _94_____% on room air at rest.  Ambulated patient on room air. Oxygen saturation was __94-96____% on room air while ambulating. Recovery pulse ox was __93-94____% on ___0____ liters of oxygen while ambulating.

## 2020-07-16 ENCOUNTER — CARE COORDINATION (OUTPATIENT)
Dept: CASE MANAGEMENT | Age: 55
End: 2020-07-16

## 2020-07-16 LAB — PANCREATIC ELASTASE, FECAL: 65 UG/G

## 2020-07-16 NOTE — CARE COORDINATION
Covid-19 Initial Outreach call, no answer.   Left VM with contact information and request  for return call at [address], [address] [place] [address] Coordination Transition

## 2020-07-17 ENCOUNTER — CARE COORDINATION (OUTPATIENT)
Dept: CASE MANAGEMENT | Age: 55
End: 2020-07-17

## 2020-07-22 DIAGNOSIS — G47.33 OBSTRUCTIVE SLEEP APNEA (ADULT) (PEDIATRIC): Primary | ICD-10-CM

## 2020-07-22 NOTE — ADT AUTH CERT
Utilization Reviews         Pneumonia by Umair Kessler RN         Review Status  Review Entered    In Primary  7/22/2020 14:04        Criteria Review          Additional Clinicals Requested:              7/10  Intermediate  Droplet Iso     VITALS:  BP (!) 161/73   Pulse 75   Temp 98.3 °F (36.8 °C) (Infrared)   Resp 18   Ht 5' 10\" (1.778 m)   Wt 270 lb 12.8 oz (122.8 kg)   SpO2 92%   BMI 38.86 kg/m²         Labs--k 3.3,  anion gap 17,  ca 8.3,  crp 14.9,  alb 3.3,  alt 39,  ast 32,  sed rate 46,       Pulm--Subjective:  · Resting again this a.m.  Oxygen saturation remains good.  Lymphopenia persists with mild hypokalemia and hypocalcemia.  LFTs are trending down. Assessment:  1. COVID-19 infection with pneumonia.  At this point, there is no hypoxia therefore no indication for antivirals or Decadron  2. Underlying asthma  3. Seronegative rheumatoid arthritis  4. Sleep apnea: Currently a moot point given that we do not know settings and the patient cannot be on positive airway pressure.        Plan:  1. Supportive care for now.  If patient's SPO2 slipped below 89% and she requires oxygen, a combination of Decadron and Remsesivir are indicated.  So far, its held above that level.     Internal Medicine--Pt feels nauseous,couldn't eat today. Assessment:     Active Problems:    COVID-19    Pneumonia due to COVID-19 virus    Dehydration    Hypokalemia    Rheumatoid arthritis (HCC)  Resolved Problems:    * No resolved hospital problems. *        Plan:  1.  Acute COVID 19 pneumonia evident on the CT with positive test, no hypoxia, no indication for Decadron or Remdesivir.  Will discuss with ID starting Plaquenil based on the new study coming from [facility]  2.  Nausea and vomiting, most likely due to viral infection, continue on Compazine as per patient worked better than other antiemetic agents.  Not improving, consult GI  3.  History of asthma, not in exacerbation, continue home inhalers.     ID--She's nauseous   ASSESSMENT:  · SARS-CoV-2 infection  · Intractable nausea associated to the above, improving with Compazine  · Viral pneumonia associated to SARS-CoV-2.  Not requiring oxygen but pulse oximetry is in the low 90s     PLAN:  · Continue supportive treatment  · If the pulse oximetry drops below 90 and the patient requires oxygen she will qualify for Remdesivir and dexamethasone  · Interestingly she was already on plaquenil  and 5 mg prednisone for RA     GI consult--CHIEF COMPLAINT:  Nausea, vomiting, diarrhea, fever, and COVID + 2020 and on admission.     IMPRESSION:  ? Intractable nausea and vomiting  ? Diarrhea   ? COVID-19 with pneumonia - defer to Pulm/ID  ? Asthma - defer to Pulm  ? RA on Plaquenil and Humira PTA - defer to PCP  ? Elevated transaminases dating back to 2020  ? Hypokalemia - defer to PCP     RECOMMENDATIONS:    STAT KUB, call me with results  DC Tigan  Phenergan IV as ordered to alt with Compazine  Stool studies as ordered  Cholestyramine as ordered, premedicate with antiemetic prior to giving  Clear liquid diet  CRP and Sed rate  IV fluids per PCP  Monitor CMP, CBC  Supportive care  Defer comorbidities to others   Continue to monitor     KUB--No significant abnormal findings.     Meds--kcl iv x 4, IVF 75hr, compazine iv x 2,   (Pt refused all oral meds today)               Intermediate  Droplet Iso     /67   Pulse 82   Temp 97.1 °F (36.2 °C) (Infrared)   Resp 18   Ht 5' 10\" (1.778 m)   Wt 270 lb 12.8 oz (122.8 kg)   SpO2 92%   BMI 38.86 kg/m²   Temp  Av.7 °F (36.5 °C)  Min: 97.1 °F (36.2 °C)  Max: 98.3 °F (36.8 °C)     Labs--k 3.2,  co2 21,  ca 8.2,  total protein 6.2,m  alb 2.7,  alt 41,  ast 43,       Internal Medicine--O2 sat 92% on room air. No fevers overnight. States nausea better controlled with phenergan and compazine. One loose BM yesterday. + cough, clear mucus.   ASSESSMENT:  · SARS-CoV-2 infection  · Intractable nausea associated to the above, apple juice. Emesis is primarily mucous. 3 loose BMs since yesterday. No fevers overnight.      Pulm--Still on RA, feels similar with respiratory function with minimal cough and not much sputum production, some diarrhea. Claims ambulating a bit inside room and tolerating.   Has apparently been refusing most of her medications, no lovenox, prednisone, and plaquanil since admission and intermittent with other medications.     GI--Patient states she has had nausea with vomiting x 2 of mucus stating she is coughing up mucus which gags her and she vomits it up. Pt reports diarrhea x 1 small bout and 1 large bout of watery brown. Pt states she is tolerating popsicles, ice chips, apple juice, and water. Pt willing to try soft diet.   Plan:      ? Continue Phenergan IV to alt with Compazine  ? Continue Reglan IV 5 mg TID AC meals as ordered  ? Stool studies pending  ? Continue Cholestyramine as ordered    ? Will check with pulmonary regarding possible mucolytic due to mucus reportedly causing her nausea  ? Continue low fat diet  ? IV fluids per PCP  ? Monitor CMP, CBC  ? Supportive care  ? Defer comorbidities to others   ? Continue to monitor     Meds--reglan iv x 3,  kcl iv x 4,  IVF 75hr,            7/13  Intermediate  Droplet Iso     97 (36.1)    18    76    143/66      97.9 (36.6)    18    77    177/93           RA 91-95%     Labs--k 3.1,  ca 8.5,  total protein 6.1,  ,  alb 2.8,  alt 40,       GI--States she is feeling better today. Denies any N/V this AM. Tolerated small amounts of breakfast this AM, \"don' taste good\". Reports to 1 emesis last night. Described as \"yellow phlegm\". Denies any hematemesis. No further diarrhea since yesterday. +flatus. States she hasn't required any breathing treatments today. POC reviewed with the patient, all questions answered.      Plan:      ? Continue Phenergan IV to alt with Compazine  ? OK for Phenergan suppository at DC, PRN nausea control  ?  Continue Reglan IV 5 mg TID AC meals as ordered  ? Stool studies negative  ? Continue Cholestyramine as ordered    ? Continue low fat diet  ? Medical management per Primary Care  ? Monitor CMP, CBC  ? Supportive care  ? Defer comorbidities to others   ? DC per Primary Care; OK from GI POV     Pulm--Awake and alert this morning.  Appears in reasonable spirits.  Mild hypokalemia is noted.  Oxygenation is marginal.  Supportive care for now.  If patient's SPO2 slipped below 89% and she requires oxygen, a combination of Decadron and Remsesivir are indicated.  Continue vigilance.      ID--O2 sat 92% on room air. No fevers overnight. States nausea better controlled with phenergan and compazine - states tolerating banana popsicles and apple juice. Emesis is primarily mucous. 3 loose BMs since yesterday. No fevers overnight. PLAN:  · Continue supportive treatment  · Remdesivir and dexamethasone  · O2 sat continues to be in the 90s- feel very uncomfortable about discharging her  · Start REM     Meds--decadron iv 6mg x 1,  reglan iv x 3,  kcl iv x 5, remdesivir 200mg iv x 1,   IVF 75hr,  compazine iv x 1,  phenergan iv x 1,                  7/14  Intermediate  Droplet Iso     98.7 (37.1)    18    83    146/88      97 (36.1)    18    67    146/74         RA 95%     Labs--co2 18, cr 0.4,  anion gap 18,  ca 8.3,  alb 3.0,  alt 36,      Pulm--Awake and alert, gives a thumbs up.  Seen by infectious disease yesterday, now on Decadron and antiviral.  1. Continue current care.  This was discussed with infectious disease     ID--O2 sat 95% on room air. No fevers overnight.   Feels really good today after 1 dose of REM  Clinically looks much better  PLAN:  · Continue Remdesivir and dexamethasone for least 2 more doses  · O2 sat continues to be in the 90s- feel very uncomfortable about discharging her  · Start REM     Internal Medicine--Pt feels somewhat better, she vomited 3 times yesterday,    Plan:  1.  Acute COVID 19 pneumonia evident on the CT with positive test, no hypoxia, no indication for Decadron or Remdesivir.  Yet ID started her on both  2.  Nausea and vomiting, most likely due to viral infection, continue on Compazine as per patient worked better than other antiemetic agents. Not improving, appreciate GI input, Marlyse Shorts was stopped, continue on Compazine and Phenergan also started  3.  History of asthma, not in exacerbation, continue home inhalers.     GI--States she is much improved from yesterday. Denies any N/V. 2 soft brown stools. States she was started on Remdesivir, and seems to be feeling better since this was started. POC reviewed with the patient, all questions answered. Plan:      ? Continue Phenergan IV to alt with Compazine  ? OK for Phenergan suppository at DC, PRN nausea control  ? Continue Reglan IV 5 mg TID AC meals as ordered  ? ID following with the patient  ? Continue Cholestyramine as ordered    ? Continue low fat diet  ? Medical management per Primary Care  ? Monitor CMP, CBC  ? Supportive care   ?  DC per Primary Care;     Meds--decadron iv 6mg x 1,  reglan iv x 2,  remdesivir 100mg iv x 1,  IVF 75hr----dc'd,

## 2020-07-31 RX ORDER — PANTOPRAZOLE SODIUM 40 MG/1
40 TABLET, DELAYED RELEASE ORAL DAILY
Qty: 30 TABLET | Refills: 5 | Status: SHIPPED
Start: 2020-07-31 | End: 2021-02-17 | Stop reason: SDUPTHER

## 2020-08-18 ENCOUNTER — VIRTUAL VISIT (OUTPATIENT)
Dept: CARDIOLOGY CLINIC | Age: 55
End: 2020-08-18
Payer: COMMERCIAL

## 2020-08-18 PROCEDURE — 99214 OFFICE O/P EST MOD 30 MIN: CPT | Performed by: INTERNAL MEDICINE

## 2020-08-18 RX ORDER — PREDNISONE 10 MG/1
5 TABLET ORAL
COMMUNITY
Start: 2020-07-01 | End: 2020-08-31

## 2020-08-18 RX ORDER — FUROSEMIDE 40 MG/1
40 TABLET ORAL DAILY PRN
Qty: 30 TABLET | Refills: 3 | Status: SHIPPED
Start: 2020-08-18 | End: 2021-08-22 | Stop reason: SDUPTHER

## 2020-08-18 RX ORDER — VALSARTAN 80 MG/1
80 TABLET ORAL 2 TIMES DAILY
Qty: 60 TABLET | Refills: 5 | Status: SHIPPED | OUTPATIENT
Start: 2020-08-18

## 2020-08-18 RX ORDER — METOPROLOL SUCCINATE 100 MG/1
100 TABLET, EXTENDED RELEASE ORAL DAILY
Qty: 30 TABLET | Refills: 5 | Status: SHIPPED
Start: 2020-08-18 | End: 2021-02-23 | Stop reason: SDUPTHER

## 2020-08-18 RX ORDER — POTASSIUM CHLORIDE 20 MEQ/1
40 TABLET, EXTENDED RELEASE ORAL
Qty: 60 TABLET | Refills: 2 | Status: SHIPPED
Start: 2020-08-18 | End: 2021-08-22 | Stop reason: SDUPTHER

## 2020-08-18 RX ORDER — MELOXICAM 15 MG/1
15 TABLET ORAL PRN
COMMUNITY

## 2020-08-18 NOTE — PROGRESS NOTES
ENDOMETRIAL ABLATION         Family History:  Family History   Problem Relation Age of Onset    Rheum Arthritis Mother     Heart Failure Mother     Colon Cancer Father        Social History:  Social History     Tobacco Use    Smoking status: Never Smoker    Smokeless tobacco: Never Used   Substance Use Topics    Alcohol use: Yes     Comment: socially    Drug use: No        Allergies:   Allergies   Allergen Reactions    Penicillins        Current Medications:    Current Outpatient Medications:     meloxicam (MOBIC) 15 MG tablet, meloxicam 15 mg tablet, Disp: , Rfl:     predniSONE (DELTASONE) 10 MG tablet, 5 mg , Disp: , Rfl:     pantoprazole (PROTONIX) 40 MG tablet, Take 1 tablet by mouth daily, Disp: 30 tablet, Rfl: 5    albuterol (PROVENTIL) (2.5 MG/3ML) 0.083% nebulizer solution, Take 2.5 mg by nebulization every 6 hours as needed for Wheezing, Disp: , Rfl:     potassium chloride (KLOR-CON M) 20 MEQ extended release tablet, Take 2 tablets by mouth daily (with breakfast), Disp: 60 tablet, Rfl: 0    fluticasone-vilanterol (BREO ELLIPTA) 100-25 MCG/INH AEPB inhaler, Inhale 1 puff into the lungs daily, Disp: 3 each, Rfl: 3    metoprolol succinate (TOPROL XL) 100 MG extended release tablet, Take 1 tablet by mouth daily, Disp: 30 tablet, Rfl: 0    furosemide (LASIX) 40 MG tablet, Take 1 tablet by mouth daily, Disp: 30 tablet, Rfl: 3    montelukast (SINGULAIR) 10 MG tablet, Take 1 tablet by mouth nightly, Disp: 30 tablet, Rfl: 5    diclofenac sodium 1 % GEL, Apply 4 g topically 4 times daily (Patient taking differently: Apply 4 g topically 4 times daily as needed ), Disp: 4 Tube, Rfl: 1    Adalimumab 40 MG/0.4ML PNKT, Inject 40 mg Sub Q every other week - citrate free (Patient taking differently: Inject 40 mg Sub Q every other week - citrate free Has not taken since catching flu B in Feb), Disp: 2 each, Rfl: 2    albuterol sulfate  (90 Base) MCG/ACT inhaler, Inhale 2 puffs into the lungs 4 times daily as needed for Wheezing, Disp: 1 Inhaler, Rfl: 0    Cholecalciferol (VITAMIN D) 2000 units CAPS capsule, Take by mouth, Disp: , Rfl:     cetirizine (ZYRTEC) 10 MG tablet, Take 10 mg by mouth daily, Disp: , Rfl:     sulfaSALAzine (AZULFIDINE) 500 MG tablet, Take 2 tablets by mouth 2 times daily (Patient not taking: Reported on 8/18/2020), Disp: 120 tablet, Rfl: 1    valsartan (DIOVAN) 80 MG tablet, Take 1 tablet by mouth 2 times daily (Patient not taking: Reported on 8/18/2020), Disp: 60 tablet, Rfl: 3    Physical Exam:  There were no vitals taken for this visit.   Wt Readings from Last 3 Encounters:   07/14/20 272 lb 12.8 oz (123.7 kg)   05/31/20 275 lb (124.7 kg)   05/28/20 272 lb (123.4 kg)     Video visit    Appearance: Awake, alert and oriented x 3, no acute respiratory distress      Laboratory Tests:  Lab Results   Component Value Date    CREATININE 0.5 07/15/2020    BUN 13 07/15/2020     07/15/2020    K 3.6 07/15/2020     07/15/2020    CO2 23 07/15/2020     Lab Results   Component Value Date    MG 2.1 07/10/2020     Lab Results   Component Value Date    WBC 6.5 07/15/2020    HGB 11.9 07/15/2020    HCT 34.9 07/15/2020    MCV 89.5 07/15/2020     07/15/2020     Lab Results   Component Value Date    ALT 28 07/15/2020    AST 16 07/15/2020    ALKPHOS 49 07/15/2020    BILITOT 0.5 07/15/2020     Lab Results   Component Value Date    CKTOTAL 180 02/21/2020    CKMB 2.0 02/21/2020    TROPONINI <0.01 07/07/2020    TROPONINI <0.01 02/21/2020    TROPONINI <0.01 02/19/2020     No results found for: INR, PROTIME  Lab Results   Component Value Date    TSH 1.770 02/18/2020     No results found for: LABA1C  No results found for: EAG  Lab Results   Component Value Date    CHOL 170 11/09/2019     Lab Results   Component Value Date    TRIG 46 11/09/2019     Lab Results   Component Value Date    HDL 81 11/09/2019     Lab Results   Component Value Date    LDLCALC 80 11/09/2019     Lab Results   Component Value Date    LABVLDL 9 11/09/2019     No results found for: CHOLHDLRATIO  No results for input(s): PROBNP in the last 72 hours. Cardiac Tests:  ECG: Sinus rhythm 68 bpm.  Normal axis. IVCD QRS duration 102 ms. First-degree AV block with MN interval 234 ms. Chest X-ray: 2/17/2020  Impression       No evidence for acute cardiopulmonary process. Echocardiogram:   TTE 2/2020   Summary   Left ventricle is severely dilated. LVEDD 6.9 cm.   LV systolic function is mildly reduced. Ejection fraction is visually estimated at 40-45%. Grade II diastolic dysfunction. No regional wall motion abnormalities seen. Normal left ventricular wall thickness. Abnormal LV septal motion consistent with conduction disorder. Normal right ventricular size and function. The left atrium is moderately-severely dilated. Mildly enlarged right atrium. Mild thickening of the mitral valve leaflets. Mild-moderate mitral regurgitation. Stress test:      Cardiac catheterization: 2/20/2020, Dr. Becerra James:     LEFT MAIN:  It is a large artery with no angiographic stenosis noted.     LAD:  It is a large artery reaching the apex and giving rise to three  small diagonal branches and several septal perforators. No angiographic  stenosis noted.     RAMUS:  It is a large bifurcating branch with no angiographic stenosis  noted.     LEFT CIRCUMFLEX:  It is a large artery giving rise to 3 obtuse marginal  branches, an SA dolores or AV dolores branch, then continues to the AV  groove. The first marginal was very small. The second and third  marginal branches were fair in size. No angiographic stenosis noted.     RCA:  It is a large dominant artery giving rise to an RV marginal  branch, a large PDA and large PLV branch. No angiographic stenosis  noted.     Left ventriculogram revealed mildly dilated left ventricle with an  ejection fraction of 35% to 40%. No mitral regurgitation was noted.     IMPRESSION:  1. Absence of coronary angiographic stenosis. 2.  Elevated left ventricular end-diastolic pressure. 3.  Ejection fraction 35% to 40%. Cardiac MRI 3/2020  Cardiac function data:    Left ventricular ejection fraction: 48% mild decreased. LV end-diastolic volume: 114 mL  increased    LV end-systolic volume: 455 mL,  increased         Stroke volume: 123 mL, increased    Cardiac output: 9.4 m/m, increased    Myocardial mass at end diastole: 194.7 g, mild increased    Myocardial mass average: 203 g, increased         Cardiac morphology: The heart is mild diffuse enlargement. The inner    diameter for the left ventricle in the 4 chamber imaging is 6.3 cm and    for the right ventricle is 4.4 cm, in diastole.         There is normal appearance for the mitral and tricuspid valve. No    indication for stenosis or insufficiency.         There is normal appearance for the aortic valve, it is a tricuspid    valve. No indication for stenosis or insufficiency.         There is no pericardial effusion.         Thickening of the interventricular septum and posterior wall of the    left ventricle is about 10 mm.         Cardiac dynamic:    There is mild diffuse hypokinesia of the left ventricle. There is    visualization of asymmetrical walls of contraction or dyskinesia.              Pre and postcontrast images: No indication for lack of proper contrast    enhancement on the early dynamic phase to indicate the region of    ischemia. No late post contrast enhancement indicated an area of    previous injury or scar. No nodularity is seen in the left ventricle    myocardium.         No abnormal signal seen in the abdominal inversion recovery images    indicate edema in the myocardium.                   Impression    1. Mild increased size of the heart with a enlargement of the left    ventricle.         2. mild decreased ejection fraction.         3. Mild global hypokinesia of the LV myocardium.  No indication for    mitral valve, tricuspid valve and aortic valve stenosis or    insufficiency.         4. No indication for myocardial ischemia or myocardial scar or    myocardial edema. Orders Placed This Encounter   Procedures    BASIC METABOLIC PANEL    EKG 12 lead    ECHO LIMITED        Requested Prescriptions      No prescriptions requested or ordered in this encounter        IMPRESSION:  1. Dilated nonischemic cardiomyopathy. EF 40 to 45%. LVEDD 6.9 cm.  EF 48% on cardiac MRI. No evidence of myocarditis. Possibly genetic. 2. Chronic heart failure with reduced ejection fraction, compensated. 3. SVT  4. Intermittent left bundle branch block, QRS duration as high as 166 ms, new since 2016.    5. COVID-19 infection 7/2020  6. Acute influenza B/asthma exacerbation 2/17/2020  7. Hypokalemia  8. Comorbid disease: Rheumatoid arthritis, asthma, probable ASHLIE, history endometrial ablation, nephrolithiasis, ureteral stent placement for hydronephrosis, obesity BMI 39.1 kg/m²    Recommendations:  Seems to be doing well from a cardiac standpoint, recovering from recent COVID-19 infection. Evidence of recurrent left bundle branch block on the recent admission, I am wondering if she has had any worsening of her LVEF related to the infection. · Repeat limited 2D echocardiogram to assess LV function  · Office visit for EKG  · BMP to check potassium level  · Continue KCl for now  · Continue current guideline directed medical therapy with metoprolol succinate 100 mg daily and valsartan 80 twice daily  · Continue furosemide 40 mg as needed  · CPAP titration study pending for September  · Aggressive risk factor modification including weight loss recommended  · Follow-up in 3 months or sooner if the need arises    The patient's current medication list, allergies, problem list and results of all previously ordered testing were reviewed at today's visit. Jeffery Denny MD   [facility]) Cardiology    Pramodlisafaye Wallace is a 63 y.o. female being evaluated by a Virtual Visit (video visit) encounter to address concerns as mentioned above. A caregiver was present when appropriate. Due to this being a TeleHealth encounter (During EVAJD-87 public health emergency), evaluation of the following organ systems was limited: Vitals/Constitutional/EENT/Resp/CV/GI//MS/Neuro/Skin/Heme-Lymph-Imm. Pursuant to the emergency declaration under the [address] [place] [address], [address] [place] [address] and the [employer] Act, this Virtual Visit was conducted with patient's (and/or legal guardian's) consent, to reduce the patient's risk of exposure to COVID-19 and provide necessary medical care. The patient (and/or legal guardian) has also been advised to contact this office for worsening conditions or problems, and seek emergency medical treatment and/or call 911 if deemed necessary. Patient identification was verified at the start of the visit: Yes    Total time spent for this encounter: 25 min    Services were provided through a video synchronous discussion virtually to substitute for in-person clinic visit. Patient and provider were located at their individual homes. --Melissa Mcnair MD on 8/18/2020 at 8:11 AM    An electronic signature was used to authenticate this note.

## 2020-08-25 ENCOUNTER — HOSPITAL ENCOUNTER (OUTPATIENT)
Age: 55
Discharge: HOME | End: 2020-08-25
Payer: COMMERCIAL

## 2020-08-25 ENCOUNTER — TELEPHONE (OUTPATIENT)
Dept: CARDIOLOGY CLINIC | Age: 55
End: 2020-08-25

## 2020-08-25 LAB
ANION GAP SERPL CALCULATED.3IONS-SCNC: 8 MMOL/L (ref 7–16)
BUN BLDV-MCNC: 11 MG/DL (ref 6–20)
CALCIUM SERPL-MCNC: 9.3 MG/DL (ref 8.6–10.2)
CHLORIDE BLD-SCNC: 106 MMOL/L (ref 98–107)
CO2: 27 MMOL/L (ref 22–29)
CREAT SERPL-MCNC: 0.6 MG/DL (ref 0.5–1)
GFR AFRICAN AMERICAN: >60
GFR NON-AFRICAN AMERICAN: >60 ML/MIN/1.73
GLUCOSE BLD-MCNC: 103 MG/DL (ref 74–99)
POTASSIUM SERPL-SCNC: 4.5 MMOL/L (ref 3.5–5)
SODIUM BLD-SCNC: 141 MMOL/L (ref 132–146)

## 2020-08-25 PROCEDURE — 36415 COLL VENOUS BLD VENIPUNCTURE: CPT

## 2020-08-25 PROCEDURE — 80048 BASIC METABOLIC PNL TOTAL CA: CPT

## 2020-08-25 NOTE — TELEPHONE ENCOUNTER
Contacted patient with lab results per Dr. Karolina Gao. She verbalized understanding.    ----- Message from Jacobo Paige MD sent at 8/25/2020  3:37 PM EDT -----  Her Potassium is normal and kidney function and other electrolytes all look good.

## 2020-09-11 ENCOUNTER — HOSPITAL ENCOUNTER (OUTPATIENT)
Age: 55
Discharge: HOME | End: 2020-09-13
Payer: COMMERCIAL

## 2020-09-11 PROCEDURE — U0003 INFECTIOUS AGENT DETECTION BY NUCLEIC ACID (DNA OR RNA); SEVERE ACUTE RESPIRATORY SYNDROME CORONAVIRUS 2 (SARS-COV-2) (CORONAVIRUS DISEASE [COVID-19]), AMPLIFIED PROBE TECHNIQUE, MAKING USE OF HIGH THROUGHPUT TECHNOLOGIES AS DESCRIBED BY CMS-2020-01-R: HCPCS

## 2020-09-16 LAB
SARS-COV-2: NOT DETECTED
SOURCE: NORMAL

## 2020-09-17 ENCOUNTER — HOSPITAL ENCOUNTER (OUTPATIENT)
Dept: SLEEP CENTER | Age: 55
Discharge: HOME | End: 2020-09-17
Payer: COMMERCIAL

## 2020-09-22 ENCOUNTER — NURSE ONLY (OUTPATIENT)
Dept: CARDIOLOGY CLINIC | Age: 55
End: 2020-09-22
Payer: COMMERCIAL

## 2020-09-22 PROCEDURE — 93000 ELECTROCARDIOGRAM COMPLETE: CPT | Performed by: INTERNAL MEDICINE

## 2020-09-24 ENCOUNTER — TELEPHONE (OUTPATIENT)
Dept: CARDIOLOGY | Age: 55
End: 2020-09-24

## 2020-10-02 ENCOUNTER — TELEPHONE (OUTPATIENT)
Dept: CARDIOLOGY | Age: 55
End: 2020-10-02

## 2020-10-14 ENCOUNTER — TELEPHONE (OUTPATIENT)
Dept: CARDIOLOGY | Age: 55
End: 2020-10-14

## 2020-10-16 ENCOUNTER — HOSPITAL ENCOUNTER (OUTPATIENT)
Dept: CARDIOLOGY | Age: 55
Discharge: HOME | End: 2020-10-16
Payer: COMMERCIAL

## 2020-10-16 PROCEDURE — 93306 TTE W/DOPPLER COMPLETE: CPT | Performed by: PSYCHIATRY & NEUROLOGY

## 2020-11-11 ENCOUNTER — TELEPHONE (OUTPATIENT)
Dept: CARDIOLOGY CLINIC | Age: 55
End: 2020-11-11

## 2020-11-11 NOTE — TELEPHONE ENCOUNTER
----- Message from Leila Rodriguez MD sent at 11/11/2020  3:35 PM EST -----  Please let her know the echo showed her heart pumping function is in the low normal range of 50-55 %. It seems to have improved from before but I would still like to continue her cardiac medications.

## 2020-11-18 ENCOUNTER — OFFICE VISIT (OUTPATIENT)
Dept: CARDIOLOGY CLINIC | Age: 55
End: 2020-11-18
Payer: COMMERCIAL

## 2020-11-18 VITALS
HEART RATE: 72 BPM | DIASTOLIC BLOOD PRESSURE: 90 MMHG | BODY MASS INDEX: 40.53 KG/M2 | RESPIRATION RATE: 16 BRPM | WEIGHT: 283.1 LBS | HEIGHT: 70 IN | SYSTOLIC BLOOD PRESSURE: 136 MMHG

## 2020-11-18 PROCEDURE — 99214 OFFICE O/P EST MOD 30 MIN: CPT | Performed by: INTERNAL MEDICINE

## 2020-11-18 PROCEDURE — 93000 ELECTROCARDIOGRAM COMPLETE: CPT | Performed by: INTERNAL MEDICINE

## 2020-11-18 NOTE — PROGRESS NOTES
 CYSTOSCOPY  01/10/2015    uretroscopy,lithotripsy,stent placement    ENDOMETRIAL ABLATION         Family History:  Family History   Problem Relation Age of Onset    Rheum Arthritis Mother     Heart Failure Mother     Colon Cancer Father        Social History:  Social History     Tobacco Use    Smoking status: Never Smoker    Smokeless tobacco: Never Used   Substance Use Topics    Alcohol use: Yes     Comment: socially    Drug use: No        Allergies:   Allergies   Allergen Reactions    Penicillins        Current Medications:    Current Outpatient Medications:     meloxicam (MOBIC) 15 MG tablet, meloxicam 15 mg tablet, Disp: , Rfl:     furosemide (LASIX) 40 MG tablet, Take 1 tablet by mouth daily as needed (edema/weight gain) (Patient taking differently: Take 40 mg by mouth every 48 hours as needed (edema/weight gain) ), Disp: 30 tablet, Rfl: 3    metoprolol succinate (TOPROL XL) 100 MG extended release tablet, Take 1 tablet by mouth daily, Disp: 30 tablet, Rfl: 5    valsartan (DIOVAN) 80 MG tablet, Take 1 tablet by mouth 2 times daily, Disp: 60 tablet, Rfl: 5    potassium chloride (KLOR-CON M) 20 MEQ extended release tablet, Take 2 tablets by mouth daily (with breakfast) (Patient taking differently: Take 40 mEq by mouth as needed ), Disp: 60 tablet, Rfl: 2    pantoprazole (PROTONIX) 40 MG tablet, Take 1 tablet by mouth daily, Disp: 30 tablet, Rfl: 5    albuterol (PROVENTIL) (2.5 MG/3ML) 0.083% nebulizer solution, Take 2.5 mg by nebulization every 6 hours as needed for Wheezing, Disp: , Rfl:     fluticasone-vilanterol (BREO ELLIPTA) 100-25 MCG/INH AEPB inhaler, Inhale 1 puff into the lungs daily, Disp: 3 each, Rfl: 3    diclofenac sodium 1 % GEL, Apply 4 g topically 4 times daily (Patient taking differently: Apply 4 g topically 4 times daily as needed ), Disp: 4 Tube, Rfl: 1    Adalimumab 40 MG/0.4ML PNKT, Inject 40 mg Sub Q every other week - citrate free (Patient taking differently: Inject 40 mg Sub Q every other week - citrate free Has not taken since catching flu B in Feb), Disp: 2 each, Rfl: 2    albuterol sulfate  (90 Base) MCG/ACT inhaler, Inhale 2 puffs into the lungs 4 times daily as needed for Wheezing, Disp: 1 Inhaler, Rfl: 0    Cholecalciferol (VITAMIN D) 2000 units CAPS capsule, Take by mouth, Disp: , Rfl:     cetirizine (ZYRTEC) 10 MG tablet, Take 10 mg by mouth daily, Disp: , Rfl:     sulfaSALAzine (AZULFIDINE) 500 MG tablet, Take 2 tablets by mouth 2 times daily (Patient not taking: Reported on 11/18/2020), Disp: 120 tablet, Rfl: 1    montelukast (SINGULAIR) 10 MG tablet, Take 1 tablet by mouth nightly (Patient not taking: Reported on 11/18/2020), Disp: 30 tablet, Rfl: 5    Physical Exam:  BP (!) 136/90   Pulse 72   Resp 16   Ht 5' 10\" (1.778 m)   Wt 283 lb 1.6 oz (128.4 kg)   BMI 40.62 kg/m²   Wt Readings from Last 3 Encounters:   11/18/20 283 lb 1.6 oz (128.4 kg)   07/14/20 272 lb 12.8 oz (123.7 kg)   05/31/20 275 lb (124.7 kg)     Appearance: Overweight female, awake, alert and oriented x 3, no acute respiratory distress  Skin: Intact, no rash  Head: Normocephalic, atraumatic  Eyes: EOMI, no conjunctival erythema  ENMT: No pharyngeal erythema, MMM, no rhinorrhea  Neck: Supple, no elevated JVP, no carotid bruits  Lungs: Scattered rhonchi with few wheezes  Cardiac: Regular rate and rhythm, +S1S2, no murmurs apparent  Abdomen: Soft, nontender, +bowel sounds  Extremities: Moves all extremities x 4, no lower extremity edema  Neurologic: No focal motor deficits apparent, normal mood and affect, alert and oriented x 3  Peripheral Pulses: Intact posterior tibial pulses bilaterally    Laboratory Tests:  Lab Results   Component Value Date    CREATININE 0.6 08/25/2020    BUN 11 08/25/2020     08/25/2020    K 4.5 08/25/2020     08/25/2020    CO2 27 08/25/2020     Lab Results   Component Value Date    MG 2.1 07/10/2020     Lab Results   Component Value Date    WBC 6.5 07/15/2020    HGB 11.9 07/15/2020    HCT 34.9 07/15/2020    MCV 89.5 07/15/2020     07/15/2020     Lab Results   Component Value Date    ALT 28 07/15/2020    AST 16 07/15/2020    ALKPHOS 49 07/15/2020    BILITOT 0.5 07/15/2020     Lab Results   Component Value Date    CKTOTAL 180 02/21/2020    CKMB 2.0 02/21/2020    TROPONINI <0.01 07/07/2020    TROPONINI <0.01 02/21/2020    TROPONINI <0.01 02/19/2020     No results found for: INR, PROTIME  Lab Results   Component Value Date    TSH 1.770 02/18/2020     No results found for: LABA1C  No results found for: EAG  Lab Results   Component Value Date    CHOL 170 11/09/2019     Lab Results   Component Value Date    TRIG 46 11/09/2019     Lab Results   Component Value Date    HDL 81 11/09/2019     Lab Results   Component Value Date    LDLCALC 80 11/09/2019     Lab Results   Component Value Date    LABVLDL 9 11/09/2019     No results found for: CHOLHDLRATIO  No results for input(s): PROBNP in the last 72 hours. Cardiac Tests:  ECG: Sinus rhythm 72 bpm.  Normal axis. Left bundle branch block QRS duration 168 ms. First-degree AV block with a DE interval of 226 ms. Echocardiogram:   TTE 2/2020   Summary   Left ventricle is severely dilated. LVEDD 6.9 cm.   LV systolic function is mildly reduced.   Ejection fraction is visually estimated at 40-45%.   Grade II diastolic dysfunction.   No regional wall motion abnormalities seen.   Normal left ventricular wall thickness.   Abnormal LV septal motion consistent with conduction disorder.   Normal right ventricular size and function.   The left atrium is moderately-severely dilated.   Mildly enlarged right atrium.   Mild thickening of the mitral valve leaflets.   Mild-moderate mitral regurgitation. Limited TTE 10/16/2020   Summary   Limited echo to check LV function. Left ventricle is severely dilated. LVEDD 6.5 cm. LV systolic function is low normal.   Ejection fraction is visually estimated at 50-55%.    No regional wall motion abnormalities seen. Normal left ventricular wall thickness. Abnormal LV septal motion consistent with conduction disorder.     Stress test:       Cardiac catheterization: 2/20/2020, Dr. Taylor Sor:     LEFT MAIN: Allyne Kulwant is a large artery with no angiographic stenosis noted.     LAD:  It is a large artery reaching the apex and giving rise to three  small diagonal branches and several septal perforators.  No angiographic  stenosis noted.     RAMUS:  It is a large bifurcating branch with no angiographic stenosis  noted.     LEFT CIRCUMFLEX:  It is a large artery giving rise to 3 obtuse marginal  branches, an SA dolores or AV dolores branch, then continues to the AV  groove.  The first marginal was very small.  The second and third  marginal branches were fair in size.  No angiographic stenosis noted.     RCA:  It is a large dominant artery giving rise to an RV marginal  branch, a large PDA and large PLV branch.  No angiographic stenosis  noted.     Left ventriculogram revealed mildly dilated left ventricle with an  ejection fraction of 35% to 40%.  No mitral regurgitation was noted.     IMPRESSION:  1.  Absence of coronary angiographic stenosis. 2.  Elevated left ventricular end-diastolic pressure. 3.  Ejection fraction 35% to 40%.       Cardiac MRI 3/2020  Cardiac function data:    Left ventricular ejection fraction: 48% mild decreased. LV end-diastolic volume: 173 mL  increased    LV end-systolic volume: 976 mL,  increased         Stroke volume: 123 mL, increased    Cardiac output: 9.4 m/m, increased    Myocardial mass at end diastole: 194.7 g, mild increased    Myocardial mass average: 203 g, increased         Cardiac morphology: The heart is mild diffuse enlargement. The inner    diameter for the left ventricle in the 4 chamber imaging is 6.3 cm and    for the right ventricle is 4.4 cm, in diastole.         There is normal appearance for the mitral and tricuspid valve.  No indication for stenosis or insufficiency.         There is normal appearance for the aortic valve, it is a tricuspid    valve. No indication for stenosis or insufficiency.         There is no pericardial effusion.         Thickening of the interventricular septum and posterior wall of the    left ventricle is about 10 mm.         Cardiac dynamic:    There is mild diffuse hypokinesia of the left ventricle. There is    visualization of asymmetrical walls of contraction or dyskinesia.              Pre and postcontrast images: No indication for lack of proper contrast    enhancement on the early dynamic phase to indicate the region of    ischemia. No late post contrast enhancement indicated an area of    previous injury or scar. No nodularity is seen in the left ventricle    myocardium.         No abnormal signal seen in the abdominal inversion recovery images    indicate edema in the myocardium.                   Impression    1. Mild increased size of the heart with a enlargement of the left    ventricle.         2. mild decreased ejection fraction.         3. Mild global hypokinesia of the LV myocardium. No indication for    mitral valve, tricuspid valve and aortic valve stenosis or    insufficiency.         4. No indication for myocardial ischemia or myocardial scar or    myocardial edema. Orders Placed This Encounter   Procedures    Cardiac event monitor    EKG 12 Lead        Requested Prescriptions      No prescriptions requested or ordered in this encounter        IMPRESSION:  1. Dilated nonischemic cardiomyopathy. EF improved 50-55 %. LVEDD 6.9 cm. Suspect viral myocarditis (influenza, other viral) versus hereditary (mother had a cardiomyopathy), versus other  2. Chronic heart failure with reduced ejection fraction, compensated. NYHA class II, ACC stage C  3. Paroxysmal SVT, palpitations  4. Left bundle branch block  5. First-degree AV block  6. COVID-19 infection 7/2020  7.  Acute influenza B/asthma exacerbation 2/17/2020  8. Obstructive sleep apnea, untreated  9. Obesity, BMI 40.6 kg/m²  10. Comorbid disease: Rheumatoid arthritis, asthma, history endometrial ablation, nephrolithiasis, ureteral stent placement for hydronephrosis    Recommendations:  Doing fairly well from cardiac standpoint. EF has recovered but the LV remains dilated    · 30-day cardiac event monitor to rule out underlying atrial fibrillation or other arrhythmias  · Continue current GDMT with metoprolol succinate 100 mg daily and valsartan 80 mg twice daily, continue current doses  · Continue furosemide 40 mg every other day with additional doses as needed  · Consider 20 mg dose intermittently to avoid brisk diuretic effect when she is at work  · Encouraged her to follow-up with Dr. Rosa Cuellar to try to get CPAP sorted out, apparently the CPAP titration study was not covered by her insurance  · Aggressive risk factor modification including weight loss recommended  · Follow-up in 3 months or sooner if need arises    The patient's current medication list, allergies, problem list and results of all previously ordered testing were reviewed at today's visit.     Jamil Benavides MD   [facility]) Cardiology

## 2020-11-18 NOTE — PROGRESS NOTES
Patient was seen in the office for the placement of a 30 day [employer] monitor per Dr. Catalina Boogie. Patient tolerated well and understood instructions.      JUSTINE SHEFFIELD

## 2020-12-29 ENCOUNTER — PATIENT MESSAGE (OUTPATIENT)
Dept: PRIMARY CARE CLINIC | Age: 55
End: 2020-12-29

## 2020-12-29 RX ORDER — PREDNISONE 10 MG/1
5 TABLET ORAL DAILY
Qty: 30 TABLET | Refills: 1 | Status: SHIPPED
Start: 2020-12-29 | End: 2021-04-29 | Stop reason: SDUPTHER

## 2020-12-29 NOTE — TELEPHONE ENCOUNTER
From: Brooke Escobedo  To: Graham Etienne DO  Sent: 12/29/2020 11:45 AM EST  Subject: Prescription Question    Dr. Svitlana Wilson,    Can you please refill my 5 mg prednisone? My arthritis is terrible and I can't get into rheumatologist until March. Hope you have a happy new year.     Thanks   Brooke Escobedo

## 2021-01-17 ENCOUNTER — PATIENT MESSAGE (OUTPATIENT)
Dept: PRIMARY CARE CLINIC | Age: 56
End: 2021-01-17

## 2021-01-17 DIAGNOSIS — N32.81 OAB (OVERACTIVE BLADDER): ICD-10-CM

## 2021-01-18 RX ORDER — SOLIFENACIN SUCCINATE 10 MG/1
10 TABLET, FILM COATED ORAL DAILY
Qty: 30 TABLET | Refills: 5 | Status: SHIPPED
Start: 2021-01-18 | End: 2021-08-01 | Stop reason: SDUPTHER

## 2021-01-18 NOTE — TELEPHONE ENCOUNTER
From: Mag Max  To: Melvina Lama DO  Sent: 2021 12:08 PM EST  Subject: Prescription Question    Dr Maritza Molina,     I hadn't taken my bladder medicine for about 6 months but my bladder control was so bad I started it back up and it helped. I went to refill and it was .  [facility] reorder solifenacin succinate 10 mg    Thank you    Mag Max

## 2021-02-08 DIAGNOSIS — R00.2 PALPITATIONS: ICD-10-CM

## 2021-02-09 NOTE — PROGRESS NOTES
Sabrina Mendoza  1965  Date of Service: 2/9/2021        1. Monitor shows underlying  Sinus Rhythm with bundle branch block  2. PVC  (4%)  3.  Event on 11/22/20 9:30 am. Cannot exclude atrial fibrillation         Quita Lei Mezu-Chukwu M.D [facility]

## 2021-02-17 ENCOUNTER — TELEPHONE (OUTPATIENT)
Dept: CARDIOLOGY CLINIC | Age: 56
End: 2021-02-17

## 2021-02-17 ENCOUNTER — PATIENT MESSAGE (OUTPATIENT)
Dept: PRIMARY CARE CLINIC | Age: 56
End: 2021-02-17

## 2021-02-17 DIAGNOSIS — R00.2 PALPITATIONS: Primary | ICD-10-CM

## 2021-02-17 RX ORDER — PANTOPRAZOLE SODIUM 40 MG/1
40 TABLET, DELAYED RELEASE ORAL DAILY
Qty: 30 TABLET | Refills: 5 | Status: SHIPPED
Start: 2021-02-17 | End: 2021-08-01 | Stop reason: SDUPTHER

## 2021-02-17 NOTE — TELEPHONE ENCOUNTER
Contacted patient and advised that referral is being placed with [facility] DNEISE. Patient verbalized understanding.

## 2021-02-17 NOTE — TELEPHONE ENCOUNTER
Contacted patient with results. Patient indicates that she has palpitations and SOB once every few weeks. These episodes some time last all day, some times only for an hour or so. Did not feel dizzy or faint at the time. Patient is amenable to referral to EP. Please advise.        ----- Message from Jamie Owens MD sent at 2/17/2021  3:43 PM EST -----    Let her know the monitor showed a possible brief episodes of atrial fib, not definitive and it did not last long; of the 25 days of monitoring there may have been 45 minutes at most.  Otherwise rhythm was normal, with occasional PVCs (4%). Is she still having the palpitations? I may refer her to EP but she will benefit most from getting her CPAP set up, she is certainly at risk of having afib. Let her know it took a while because I requested more info from the company, reviewed things multiple times and had EP look as well.

## 2021-02-22 ENCOUNTER — OFFICE VISIT (OUTPATIENT)
Dept: NON INVASIVE DIAGNOSTICS | Age: 56
End: 2021-02-22
Payer: COMMERCIAL

## 2021-02-22 VITALS
BODY MASS INDEX: 41.2 KG/M2 | SYSTOLIC BLOOD PRESSURE: 136 MMHG | RESPIRATION RATE: 20 BRPM | HEART RATE: 83 BPM | WEIGHT: 287.8 LBS | DIASTOLIC BLOOD PRESSURE: 88 MMHG | HEIGHT: 70 IN

## 2021-02-22 DIAGNOSIS — I50.22 CHRONIC SYSTOLIC CONGESTIVE HEART FAILURE (HCC): ICD-10-CM

## 2021-02-22 DIAGNOSIS — R00.2 PALPITATIONS: Primary | ICD-10-CM

## 2021-02-22 DIAGNOSIS — I42.8 NICM (NONISCHEMIC CARDIOMYOPATHY) (HCC): ICD-10-CM

## 2021-02-22 DIAGNOSIS — R53.83 FATIGUE, UNSPECIFIED TYPE: ICD-10-CM

## 2021-02-22 DIAGNOSIS — R94.31 ABNORMAL EKG: ICD-10-CM

## 2021-02-22 DIAGNOSIS — I44.7 LBBB (LEFT BUNDLE BRANCH BLOCK): ICD-10-CM

## 2021-02-22 PROCEDURE — G8484 FLU IMMUNIZE NO ADMIN: HCPCS | Performed by: INTERNAL MEDICINE

## 2021-02-22 PROCEDURE — 3017F COLORECTAL CA SCREEN DOC REV: CPT | Performed by: INTERNAL MEDICINE

## 2021-02-22 PROCEDURE — 99204 OFFICE O/P NEW MOD 45 MIN: CPT | Performed by: INTERNAL MEDICINE

## 2021-02-22 PROCEDURE — 1036F TOBACCO NON-USER: CPT | Performed by: INTERNAL MEDICINE

## 2021-02-22 PROCEDURE — G8427 DOCREV CUR MEDS BY ELIG CLIN: HCPCS | Performed by: INTERNAL MEDICINE

## 2021-02-22 PROCEDURE — G8417 CALC BMI ABV UP PARAM F/U: HCPCS | Performed by: INTERNAL MEDICINE

## 2021-02-22 PROCEDURE — 93000 ELECTROCARDIOGRAM COMPLETE: CPT | Performed by: INTERNAL MEDICINE

## 2021-02-22 ASSESSMENT — ENCOUNTER SYMPTOMS
CHEST TIGHTNESS: 0
SHORTNESS OF BREATH: 0
WHEEZING: 0
ABDOMINAL PAIN: 0
SINUS PRESSURE: 0
EYE REDNESS: 0
COUGH: 0
NAUSEA: 0
COLOR CHANGE: 0
DIARRHEA: 0
ABDOMINAL DISTENTION: 0

## 2021-02-22 NOTE — PROGRESS NOTES
Cardiac Electrophysiology Outpatient Progress Note    Dinorah Stuart  1965  Date of Service: 2/22/2021  Referring Provider/PCP: Claudeen Bergamo, DO  Chief Complaint:   Chief Complaint   Patient presents with    Palpitations     New Patient palpitations-  Patient complains of sob, palpitations and elevated heart rates         HISTORY OF PRESENT ILLNESS    Dinorah Stuart presents to the office today for the management of these Electrophysiology conditions: nonischemic cardiomyopathy, left bundle branch block. She presented with cough, shortness of breath and was found to have new onset cardiomyopathy with LVEF 40- 45%, new left bundle branch block on 2/17/2020. Cardiac catheterization did not show any angiographic coronary disease, LVEF 35 to 40% on cath. Last LVEF in 2016 was 65%. Other history includes sleep apnea, morbid obesity, asthma, rheumatoid arthritis, mitral valve prolapse.     2/22/2021: Today Ms. Sandi Shearer reports since starting the medications for CHF and palpitations, she has not really had much improvement. She reports that she continues to have increased fatigue, flushing sensation and  SOB/SOBOE with minimal exertion and is unable to complete her ADLs. She uses a smart watch to monitor her HR, she reports she \"sustained\" a HR in the 140's for 8 hours. She wore a MCOT, we cannot exclude atrial fibrillation. We discussed about repeat 30 day event monitor vs linq insertion, for long term monitoring. She presents today in SR and denies any chest pain, dizziness or syncope. .       Patient Active Problem List    Diagnosis Date Noted    COVID-19 07/07/2020    Pneumonia due to COVID-19 virus     Hypokalemia     Rheumatoid arthritis ([address]     LBBB (left bundle branch block) 03/10/2020    Cardiomyopathy ([address] 02/20/2020    Tachycardia 02/18/2020    Rheumatoid arthritis of multiple sites with negative rheumatoid factor ([address] 08/14/2019    Viral URI 03/28/2019    Bronchitis 03/28/2019    Acute cystitis without hematuria 01/10/2016    Arthritis     Asthma        Family History   Problem Relation Age of Onset    Rheum Arthritis Mother     Heart Failure Mother     Colon Cancer Father          Past Surgical History:   Procedure Laterality Date    CHOLECYSTECTOMY      CYSTOSCOPY  01/10/2015    uretroscopy,lithotripsy,stent placement    ENDOMETRIAL ABLATION         Current Outpatient Medications   Medication Sig Dispense Refill    pantoprazole (PROTONIX) 40 MG tablet Take 1 tablet by mouth daily 30 tablet 5    fluticasone-salmeterol (ADVAIR) 250-50 MCG/DOSE AEPB Inhale 1 puff into the lungs every 12 hours 60 each 3    solifenacin (VESICARE) 10 MG tablet Take 1 tablet by mouth daily 30 tablet 5    predniSONE (DELTASONE) 10 MG tablet Take 0.5 tablets by mouth daily 30 tablet 1    meloxicam (MOBIC) 15 MG tablet Take 15 mg by mouth as needed       furosemide (LASIX) 40 MG tablet Take 1 tablet by mouth daily as needed (edema/weight gain) (Patient taking differently: Take 20 mg by mouth daily ) 30 tablet 3    metoprolol succinate (TOPROL XL) 100 MG extended release tablet Take 1 tablet by mouth daily 30 tablet 5    valsartan (DIOVAN) 80 MG tablet Take 1 tablet by mouth 2 times daily 60 tablet 5    potassium chloride (KLOR-CON M) 20 MEQ extended release tablet Take 2 tablets by mouth daily (with breakfast) (Patient taking differently: Take 20 mEq by mouth daily ) 60 tablet 2    albuterol (PROVENTIL) (2.5 MG/3ML) 0.083% nebulizer solution Take 2.5 mg by nebulization every 6 hours as needed for Wheezing      diclofenac sodium 1 % GEL Apply 4 g topically 4 times daily (Patient taking differently: Apply 4 g topically 4 times daily as needed ) 4 Tube 1    Adalimumab 40 MG/0.4ML PNKT Inject 40 mg Sub Q every other week - citrate free (Patient taking differently: Inject 40 mg Sub Q every other week - citrate free  Has not taken since catching flu B in Feb) 2 each 2    albuterol sulfate  (90 Base) MCG/ACT inhaler Inhale 2 puffs into the lungs 4 times daily as needed for Wheezing 1 Inhaler 0    Cholecalciferol (VITAMIN D) 2000 units CAPS capsule Take by mouth      cetirizine (ZYRTEC) 10 MG tablet Take 10 mg by mouth daily       No current facility-administered medications for this visit. Allergies   Allergen Reactions    Penicillins            ROS:   Review of Systems   Constitutional: Negative for fatigue and fever. HENT: Negative for congestion, nosebleeds and sinus pressure. Eyes: Negative for redness and visual disturbance. Respiratory: Negative for cough, chest tightness, shortness of breath and wheezing. Cardiovascular: Negative for chest pain, palpitations and leg swelling. Gastrointestinal: Negative for abdominal distention, abdominal pain, diarrhea and nausea. Endocrine: Negative for cold intolerance, heat intolerance, polydipsia and polyphagia. Genitourinary: Negative for difficulty urinating, frequency and urgency. Musculoskeletal: Negative for arthralgias, joint swelling and myalgias. Skin: Negative for color change and wound. Neurological: Negative for dizziness, syncope, weakness and numbness. Psychiatric/Behavioral: Negative for agitation, behavioral problems, confusion, decreased concentration, hallucinations and suicidal ideas. The patient is not nervous/anxious. PHYSICAL EXAM:  Vitals:    02/22/21 0912   BP: 136/88   Pulse: 83   Resp: 20   Weight: 287 lb 12.8 oz (130.5 kg)   Height: 5' 10\" (1.778 m)     Physical Exam  Vitals signs reviewed. Constitutional:       Appearance: Normal appearance. HENT:      Head: Normocephalic. Mouth/Throat:      Mouth: Mucous membranes are moist.      Pharynx: Oropharynx is clear. Eyes:      Conjunctiva/sclera: Conjunctivae normal.   Neck:      Musculoskeletal: Normal range of motion and neck supple. Vascular: No carotid bruit.    Cardiovascular:      Rate and Rhythm: Normal rate and regular rhythm. Pulses: Normal pulses. Heart sounds: Normal heart sounds. Pulmonary:      Effort: Pulmonary effort is normal.      Breath sounds: Normal breath sounds. No rales. Chest:      Chest wall: No tenderness. Abdominal:      General: Bowel sounds are normal.      Palpations: Abdomen is soft. Musculoskeletal: Normal range of motion. Skin:     General: Skin is warm. Neurological:      General: No focal deficit present. Mental Status: She is alert and oriented to person, place, and time. Psychiatric:         Mood and Affect: Mood normal.         Behavior: Behavior normal.         Thought Content:  Thought content normal.          Pertinent Labs:  CBC:   WBC (E9/L)   Date Value   07/15/2020 6.5   07/14/2020 7.4   07/12/2020 8.3     Hemoglobin (g/dL)   Date Value   07/15/2020 11.9   07/14/2020 12.6   07/12/2020 12.1     Hematocrit (%)   Date Value   07/15/2020 34.9   07/14/2020 37.2   07/12/2020 37.2     Platelets (E3/X)   Date Value   07/15/2020 321   07/14/2020 316   07/12/2020 283      BMP:   Sodium (mmol/L)   Date Value   08/25/2020 141   07/15/2020 138   07/14/2020 139     Potassium (mmol/L)   Date Value   08/25/2020 4.5   07/15/2020 3.6   07/15/2020 2.9 (L)     Potassium reflex Magnesium (mmol/L)   Date Value   07/07/2020 3.3 (L)   04/14/2019 3.8   02/15/2019 3.3 (L)     Magnesium (mg/dL)   Date Value   07/10/2020 2.1   07/09/2020 2.1   07/08/2020 1.9     Chloride (mmol/L)   Date Value   08/25/2020 106   07/15/2020 101   07/14/2020 103     CO2 (mmol/L)   Date Value   08/25/2020 27   07/15/2020 23   07/14/2020 18 (L)     BUN (mg/dL)   Date Value   08/25/2020 11   07/15/2020 13   07/14/2020 8     CREATININE (mg/dL)   Date Value   08/25/2020 0.6   07/15/2020 0.5   07/14/2020 0.4 (L)     Glucose (mg/dL)   Date Value   08/25/2020 103 (H)   07/15/2020 96   07/14/2020 92     Calcium (mg/dL)   Date Value   08/25/2020 9.3   07/15/2020 8.1 (L)   07/14/2020 8.3 (L)      INR: No results found for: INR   BNP: No results found for: BNP   TSH:   TSH (uIU/mL)   Date Value   02/18/2020 1.770   11/09/2019 2.720      Cardiac Injury Profile: Total CK (U/L)   Date Value   02/21/2020 180     CK-MB (ng/mL)   Date Value   02/21/2020 2.0     Troponin (ng/mL)   Date Value   07/07/2020 <0.01     Lipid Profile:   Triglycerides (mg/dL)   Date Value   11/09/2019 46     HDL (mg/dL)   Date Value   11/09/2019 81     LDL Calculated (mg/dL)   Date Value   11/09/2019 80     Cholesterol, Total (mg/dL)   Date Value   11/09/2019 170      Hemoglobin A1C: No results found for: LABA1C        Pertinent Cardiac Testing:     TTE:    Summary   Left ventricle is severely dilated. LVEDD 6.9 cm.   LV systolic function is mildly reduced.   Ejection fraction is visually estimated at 40-45%.   Grade II diastolic dysfunction.   No regional wall motion abnormalities seen.   Normal left ventricular wall thickness.   Abnormal LV septal motion consistent with conduction disorder.   Normal right ventricular size and function.   The left atrium is moderately-severely dilated.   Mildly enlarged right atrium.   Mild thickening of the mitral valve leaflets.   Mild-moderate mitral regurgitation.     2D echocardiogram January 12, 2016 EF 65% with no mitral valve prolapse     Cardiac cath 2/20/2020  IMPRESSION:  1. Absence of coronary angiographic stenosis. 2.  Elevated left ventricular end-diastolic pressure. 3.  Ejection fraction 35% to 40%. EKG 3/10/2020 shows normal sinus rhythm with narrow QRS,  ms, rate 68 ms  EKG 2/17/20 : NSR, LBBB, QRS  166ms, HR 95    10/16/20 TTE   Left ventricle is severely dilated. LVEDD 6.5 cm. LV systolic function is low normal.   Ejection fraction is visually estimated at 50-55%. No regional wall motion abnormalities seen. Normal left ventricular wall thickness. Abnormal LV septal motion consistent with conduction disorder. 3/13/20 Cardiac MRI  1.  Mild increased size of the heart with a enlargement of the left   ventricle.       2. mild decreased ejection fraction.       3. Mild global hypokinesia of the LV myocardium. No indication for   mitral valve, tricuspid valve and aortic valve stenosis or   insufficiency.       4. No indication for myocardial ischemia or myocardial scar or   myocardial edema.         7/7/20 CTA  No central pulmonary embolism or aortic dissection.       Multifocal patchy and groundglass opacities bilaterally with  moderate   mediastinal and hilar adenopathy and pleural effusion likely   multifocal pneumonia including Covid 19.             ECG 2/22/2021: normal sinus rhythm, LBBB    I have independently reviewed all of the ECGs and rhythm strips per above    I have personally reviewed the laboratory, cardiac diagnostic and radiographic testing as outlined above: We have requested previous records. 1. Palpitations    2. Chronic systolic congestive heart failure ([address]    3. Abnormal EKG    4. LBBB (left bundle branch block)    5. NICM (nonischemic cardiomyopathy) ([facility]    6. Fatigue, unspecified type         ASSESSMENT & PLAN    1. LBBB  - Rate related  - Reviewed multiple ECGs and with HR >90, she does have LBBB abberation but with lower heart rates, she has narrow QRS    2. NICM  - Continue GDMT  -Continue metoprolol  mg daily, Diovan 80 mg twice daily    3. SVT  - Cannot exclude atrial fibrillation  - Recommend repeat 30 day event monitor vs linq  - She wants to think about it    Plan  1. No changes in medications at this time. 2. Patient to consider MCOT versus ILR insertion, for long term monitoring. 3. Follow up PRN. Thank you for allowing me to participate in your patient's care.     Edward Brown MD  Cardiac Electrophysiology  [address] [place] [address]

## 2021-02-23 ENCOUNTER — PATIENT MESSAGE (OUTPATIENT)
Dept: PRIMARY CARE CLINIC | Age: 56
End: 2021-02-23

## 2021-02-23 RX ORDER — METOPROLOL SUCCINATE 100 MG/1
100 TABLET, EXTENDED RELEASE ORAL DAILY
Qty: 30 TABLET | Refills: 5 | Status: SHIPPED
Start: 2021-02-23 | End: 2021-09-02 | Stop reason: SDUPTHER

## 2021-04-29 ENCOUNTER — PATIENT MESSAGE (OUTPATIENT)
Dept: PRIMARY CARE CLINIC | Age: 56
End: 2021-04-29

## 2021-04-29 RX ORDER — PREDNISONE 10 MG/1
5 TABLET ORAL DAILY
Qty: 30 TABLET | Refills: 1 | Status: SHIPPED
Start: 2021-04-29 | End: 2021-08-01 | Stop reason: SDUPTHER

## 2021-04-29 NOTE — TELEPHONE ENCOUNTER
From: Yeyo Salazar  To: Bert Gould DO  Sent: 4/29/2021 7:18 AM EDT  Subject: Prescription Question    Good morning,    Please refill my prednisone 5 mg. [facility] Thank you so much.     Mary Hammonds

## 2021-06-15 DIAGNOSIS — M06.09 RHEUMATOID ARTHRITIS OF MULTIPLE SITES WITH NEGATIVE RHEUMATOID FACTOR (HCC): Primary | ICD-10-CM

## 2021-06-16 RX ORDER — HYDROXYCHLOROQUINE SULFATE 200 MG/1
TABLET, FILM COATED ORAL
Qty: 180 TABLET | Refills: 0 | Status: SHIPPED
Start: 2021-06-16 | End: 2021-08-22 | Stop reason: SDUPTHER

## 2021-08-01 DIAGNOSIS — N32.81 OAB (OVERACTIVE BLADDER): ICD-10-CM

## 2021-08-02 RX ORDER — PANTOPRAZOLE SODIUM 40 MG/1
40 TABLET, DELAYED RELEASE ORAL DAILY
Qty: 30 TABLET | Refills: 5 | Status: SHIPPED
Start: 2021-08-02 | End: 2022-02-13 | Stop reason: SDUPTHER

## 2021-08-02 RX ORDER — SOLIFENACIN SUCCINATE 10 MG/1
10 TABLET, FILM COATED ORAL DAILY
Qty: 30 TABLET | Refills: 5 | Status: SHIPPED
Start: 2021-08-02 | End: 2022-02-13 | Stop reason: SDUPTHER

## 2021-08-02 RX ORDER — PREDNISONE 10 MG/1
5 TABLET ORAL DAILY
Qty: 30 TABLET | Refills: 1 | Status: SHIPPED
Start: 2021-08-02 | End: 2021-08-22 | Stop reason: SDUPTHER

## 2021-08-22 DIAGNOSIS — M06.09 RHEUMATOID ARTHRITIS OF MULTIPLE SITES WITH NEGATIVE RHEUMATOID FACTOR (HCC): ICD-10-CM

## 2021-08-23 RX ORDER — PREDNISONE 10 MG/1
5 TABLET ORAL DAILY
Qty: 30 TABLET | Refills: 1 | Status: SHIPPED
Start: 2021-08-23 | End: 2022-03-07 | Stop reason: SDUPTHER

## 2021-08-23 RX ORDER — HYDROXYCHLOROQUINE SULFATE 200 MG/1
400 TABLET, FILM COATED ORAL 2 TIMES DAILY
Qty: 180 TABLET | Refills: 0 | Status: SHIPPED
Start: 2021-08-23 | End: 2021-10-15 | Stop reason: SDUPTHER

## 2021-08-23 RX ORDER — FUROSEMIDE 40 MG/1
40 TABLET ORAL DAILY PRN
Qty: 30 TABLET | Refills: 5 | Status: SHIPPED | OUTPATIENT
Start: 2021-08-23

## 2021-08-23 RX ORDER — POTASSIUM CHLORIDE 20 MEQ/1
40 TABLET, EXTENDED RELEASE ORAL
Qty: 60 TABLET | Refills: 5 | Status: SHIPPED
Start: 2021-08-23 | End: 2022-08-05 | Stop reason: SDUPTHER

## 2021-09-02 RX ORDER — METOPROLOL SUCCINATE 100 MG/1
100 TABLET, EXTENDED RELEASE ORAL DAILY
Qty: 30 TABLET | Refills: 5 | Status: SHIPPED
Start: 2021-09-02 | End: 2022-03-01 | Stop reason: SDUPTHER

## 2021-10-15 DIAGNOSIS — M06.09 RHEUMATOID ARTHRITIS OF MULTIPLE SITES WITH NEGATIVE RHEUMATOID FACTOR (HCC): ICD-10-CM

## 2021-10-15 RX ORDER — HYDROXYCHLOROQUINE SULFATE 200 MG/1
400 TABLET, FILM COATED ORAL 2 TIMES DAILY
Qty: 180 TABLET | Refills: 0 | Status: SHIPPED
Start: 2021-10-15 | End: 2022-08-29 | Stop reason: SDUPTHER

## 2021-11-22 ENCOUNTER — PATIENT MESSAGE (OUTPATIENT)
Dept: PRIMARY CARE CLINIC | Age: 56
End: 2021-11-22

## 2021-11-22 NOTE — TELEPHONE ENCOUNTER
From: Fior Brewster  To: Dr. Haro Cuff: 11/22/2021 3:05 PM EST  Subject: Prescription Question    Good afternoon,     My advair discus 250/50 needs refilled and is not available in the medication refill list.    Thank you and have a blessed Thanksgiving.     Abiola Luis

## 2022-02-13 DIAGNOSIS — N32.81 OAB (OVERACTIVE BLADDER): ICD-10-CM

## 2022-02-14 RX ORDER — SOLIFENACIN SUCCINATE 10 MG/1
10 TABLET, FILM COATED ORAL DAILY
Qty: 30 TABLET | Refills: 5 | Status: SHIPPED
Start: 2022-02-14 | End: 2022-08-29 | Stop reason: SDUPTHER

## 2022-02-14 RX ORDER — PANTOPRAZOLE SODIUM 40 MG/1
40 TABLET, DELAYED RELEASE ORAL DAILY
Qty: 30 TABLET | Refills: 5 | Status: SHIPPED
Start: 2022-02-14 | End: 2022-03-01 | Stop reason: SDUPTHER

## 2022-03-01 RX ORDER — PANTOPRAZOLE SODIUM 40 MG/1
40 TABLET, DELAYED RELEASE ORAL DAILY
Qty: 30 TABLET | Refills: 5 | Status: SHIPPED | OUTPATIENT
Start: 2022-03-01

## 2022-03-01 RX ORDER — METOPROLOL SUCCINATE 100 MG/1
100 TABLET, EXTENDED RELEASE ORAL DAILY
Qty: 30 TABLET | Refills: 5 | Status: SHIPPED
Start: 2022-03-01 | End: 2022-08-29 | Stop reason: SDUPTHER

## 2022-03-07 RX ORDER — PREDNISONE 10 MG/1
5 TABLET ORAL DAILY
Qty: 30 TABLET | Refills: 1 | Status: SHIPPED
Start: 2022-03-07 | End: 2022-04-29 | Stop reason: SDUPTHER

## 2022-04-29 ENCOUNTER — OFFICE VISIT (OUTPATIENT)
Dept: PRIMARY CARE CLINIC | Age: 57
End: 2022-04-29
Payer: COMMERCIAL

## 2022-04-29 VITALS
WEIGHT: 288 LBS | OXYGEN SATURATION: 97 % | SYSTOLIC BLOOD PRESSURE: 124 MMHG | HEIGHT: 70 IN | TEMPERATURE: 97.3 F | BODY MASS INDEX: 41.23 KG/M2 | DIASTOLIC BLOOD PRESSURE: 82 MMHG | HEART RATE: 56 BPM | RESPIRATION RATE: 18 BRPM

## 2022-04-29 DIAGNOSIS — Z00.01 ENCOUNTER FOR WELL ADULT EXAM WITH ABNORMAL FINDINGS: ICD-10-CM

## 2022-04-29 DIAGNOSIS — M06.09 RHEUMATOID ARTHRITIS OF MULTIPLE SITES WITH NEGATIVE RHEUMATOID FACTOR (HCC): ICD-10-CM

## 2022-04-29 DIAGNOSIS — Z12.31 ENCOUNTER FOR SCREENING MAMMOGRAM FOR MALIGNANT NEOPLASM OF BREAST: ICD-10-CM

## 2022-04-29 DIAGNOSIS — I42.9 CARDIOMYOPATHY, UNSPECIFIED TYPE (HCC): ICD-10-CM

## 2022-04-29 DIAGNOSIS — J01.00 ACUTE NON-RECURRENT MAXILLARY SINUSITIS: Primary | ICD-10-CM

## 2022-04-29 PROCEDURE — 99396 PREV VISIT EST AGE 40-64: CPT | Performed by: FAMILY MEDICINE

## 2022-04-29 RX ORDER — AZITHROMYCIN 250 MG/1
TABLET, FILM COATED ORAL
Qty: 6 TABLET | Refills: 0 | Status: SHIPPED | OUTPATIENT
Start: 2022-04-29

## 2022-04-29 RX ORDER — PREDNISONE 10 MG/1
5 TABLET ORAL DAILY
Qty: 30 TABLET | Refills: 1 | Status: SHIPPED | OUTPATIENT
Start: 2022-04-29

## 2022-04-29 ASSESSMENT — ENCOUNTER SYMPTOMS
RHINORRHEA: 1
BLOOD IN STOOL: 0
PHOTOPHOBIA: 0
DIARRHEA: 0
WHEEZING: 0
FACIAL SWELLING: 0
SINUS PRESSURE: 1
EYES NEGATIVE: 1
NAUSEA: 0
VOMITING: 0
SORE THROAT: 0
ABDOMINAL PAIN: 0
COLOR CHANGE: 0
TROUBLE SWALLOWING: 0
ALLERGIC/IMMUNOLOGIC NEGATIVE: 1
APNEA: 0
SHORTNESS OF BREATH: 0
SINUS COMPLAINT: 1
BACK PAIN: 0
RESPIRATORY NEGATIVE: 1
COUGH: 0
CHEST TIGHTNESS: 0

## 2022-04-29 ASSESSMENT — PATIENT HEALTH QUESTIONNAIRE - PHQ9
SUM OF ALL RESPONSES TO PHQ9 QUESTIONS 1 & 2: 0
2. FEELING DOWN, DEPRESSED OR HOPELESS: 0
SUM OF ALL RESPONSES TO PHQ QUESTIONS 1-9: 0
1. LITTLE INTEREST OR PLEASURE IN DOING THINGS: 0
SUM OF ALL RESPONSES TO PHQ QUESTIONS 1-9: 0

## 2022-04-29 NOTE — PROGRESS NOTES
Chief Complaint:   Chief Complaint   Patient presents with    Follow-up    Medication Refill       Sinus Problem  This is a new problem. The current episode started in the past 7 days. There has been no fever. The pain is mild. Associated symptoms include congestion and sinus pressure. Pertinent negatives include no coughing, ear pain, headaches, shortness of breath or sore throat. Congestive Heart Failure  Presents for follow-up visit. Pertinent negatives include no abdominal pain, chest pain, palpitations or shortness of breath. The symptoms have been stable. Compliance with diet is 51-75%. Compliance with exercise is %. Compliance with medications is %.        Patient Active Problem List   Diagnosis    Arthritis    Asthma    Acute cystitis without hematuria    Viral URI    Bronchitis    Rheumatoid arthritis of multiple sites with negative rheumatoid factor [facility]    Tachycardia    Cardiomyopathy ([address]    LBBB (left bundle branch block)    COVID-19    Pneumonia due to COVID-19 virus    Hypokalemia    Rheumatoid arthritis ([address]       Past Medical History:   Diagnosis Date    Arthritis     Asthma     Cardiomyopathy ([address] 2/20/2020    Kidney stone 1/10/2016    LBBB (left bundle branch block) 3/10/2020    Rheumatoid arthritis of multiple sites with negative rheumatoid factor ([facility] 8/14/2019       Past Surgical History:   Procedure Laterality Date    CHOLECYSTECTOMY      CYSTOSCOPY  01/10/2015    uretroscopy,lithotripsy,stent placement    ENDOMETRIAL ABLATION         Current Outpatient Medications   Medication Sig Dispense Refill    predniSONE (DELTASONE) 10 MG tablet Take 0.5 tablets by mouth daily 30 tablet 1    azithromycin (ZITHROMAX Z-VAL) 250 MG tablet Use as directed 6 tablet 0    metoprolol succinate (TOPROL XL) 100 MG extended release tablet Take 1 tablet by mouth daily 30 tablet 5    pantoprazole (PROTONIX) 40 MG tablet Take 1 tablet by mouth daily 30 tablet 5    solifenacin (VESICARE) 10 MG tablet Take 1 tablet by mouth daily 30 tablet 5    fluticasone-salmeterol (ADVAIR) 250-50 MCG/DOSE AEPB Inhale 1 puff into the lungs every 12 hours 60 each 3    hydroxychloroquine (PLAQUENIL) 200 MG tablet Take 2 tablets by mouth 2 times daily 180 tablet 0    furosemide (LASIX) 40 MG tablet Take 1 tablet by mouth daily as needed (edema/weight gain) 30 tablet 5    potassium chloride (KLOR-CON M) 20 MEQ extended release tablet Take 2 tablets by mouth daily (with breakfast) 60 tablet 5    meloxicam (MOBIC) 15 MG tablet Take 15 mg by mouth as needed       valsartan (DIOVAN) 80 MG tablet Take 1 tablet by mouth 2 times daily 60 tablet 5    albuterol (PROVENTIL) (2.5 MG/3ML) 0.083% nebulizer solution Take 2.5 mg by nebulization every 6 hours as needed for Wheezing      diclofenac sodium 1 % GEL Apply 4 g topically 4 times daily (Patient taking differently: Apply 4 g topically 4 times daily as needed ) 4 Tube 1    Adalimumab 40 MG/0.4ML PNKT Inject 40 mg Sub Q every other week - citrate free 2 each 2    albuterol sulfate  (90 Base) MCG/ACT inhaler Inhale 2 puffs into the lungs 4 times daily as needed for Wheezing 1 Inhaler 0    Cholecalciferol (VITAMIN D) 2000 units CAPS capsule Take by mouth      cetirizine (ZYRTEC) 10 MG tablet Take 10 mg by mouth daily       No current facility-administered medications for this visit.        Allergies   Allergen Reactions    Penicillins        Social History     Socioeconomic History    Marital status:      Spouse name: None    Number of children: None    Years of education: None    Highest education level: None   Occupational History     Employer: [employer]   Tobacco Use    Smoking status: Never Smoker    Smokeless tobacco: Never Used   Vaping Use    Vaping Use: Never used   Substance and Sexual Activity    Alcohol use: Yes     Comment: socially    Drug use: No    Sexual activity: Yes     Partners: Male   Other Topics Concern    None   Social History Narrative    None     Social Determinants of Health     Financial Resource Strain:     Difficulty of Paying Living Expenses: Not on file   Food Insecurity:     Worried About Running Out of Food in the Last Year: Not on file    Maria Guadalupe of Food in the Last Year: Not on file   Transportation Needs:     Lack of Transportation (Medical): Not on file    Lack of Transportation (Non-Medical): Not on file   Physical Activity:     Days of Exercise per Week: Not on file    Minutes of Exercise per Session: Not on file   Stress:     Feeling of Stress : Not on file   Social Connections:     Frequency of Communication with Friends and Family: Not on file    Frequency of Social Gatherings with Friends and Family: Not on file    Attends Mosque Services: Not on file    Active Member of [address] [place] [address] [employer] or Organizations: Not on file    Attends Club or Organization Meetings: Not on file    Marital Status: Not on file   Intimate Partner Violence:     Fear of Current or Ex-Partner: Not on file    Emotionally Abused: Not on file    Physically Abused: Not on file    Sexually Abused: Not on file   Housing Stability:     Unable to Pay for Housing in the Last Year: Not on file    Number of [place] in the Last Year: Not on file    Unstable Housing in the Last Year: Not on file       Family History   Problem Relation Age of Onset    Rheum Arthritis Mother     Heart Failure Mother     Colon Cancer Father          Review of Systems   Constitutional: Negative. HENT: Positive for congestion, postnasal drip, rhinorrhea and sinus pressure. Negative for ear pain, facial swelling, hearing loss, nosebleeds, sore throat, tinnitus and trouble swallowing. Eyes: Negative. Negative for photophobia and visual disturbance. Respiratory: Negative. Negative for apnea, cough, chest tightness, shortness of breath and wheezing.     Cardiovascular: Negative for chest pain, Cervical: No cervical adenopathy. Skin:     General: Skin is warm and dry. Coloration: Skin is not pale. Findings: No erythema or rash. Neurological:      Mental Status: She is alert and oriented to person, place, and time. Cranial Nerves: No cranial nerve deficit. Motor: No abnormal muscle tone. Coordination: Coordination normal.      Deep Tendon Reflexes: Reflexes are normal and symmetric. Psychiatric:         Behavior: Behavior normal.         Judgment: Judgment normal.                               ASSESSMENT/PLAN:    Rafaela Colin was seen today for follow-up and medication refill. Diagnoses and all orders for this visit:    Acute non-recurrent maxillary sinusitis  -     azithromycin (ZITHROMAX Z-VAL) 250 MG tablet; Use as directed    Rheumatoid arthritis of multiple sites with negative rheumatoid factor ([facility]    Cardiomyopathy, unspecified type ([facility]    Encounter for well adult exam with abnormal findings    Encounter for screening mammogram for malignant neoplasm of breast  -     АНДРЕЙ DIGITAL SCREEN W OR WO CAD BILATERAL; Future    Other orders  -     predniSONE (DELTASONE) 10 MG tablet;  Take 0.5 tablets by mouth daily      Labs per [facility]      Lizett Dietrich DO    4/29/2022  11:26 AM

## 2022-07-25 RX ORDER — POTASSIUM CHLORIDE 20 MEQ/1
40 TABLET, EXTENDED RELEASE ORAL
Qty: 60 TABLET | Refills: 1 | OUTPATIENT
Start: 2022-07-25

## 2022-08-01 RX ORDER — POTASSIUM CHLORIDE 20 MEQ/1
40 TABLET, EXTENDED RELEASE ORAL
Qty: 60 TABLET | Refills: 0 | OUTPATIENT
Start: 2022-08-01

## 2022-08-04 ENCOUNTER — OFFICE VISIT (OUTPATIENT)
Dept: FAMILY MEDICINE CLINIC | Age: 57
End: 2022-08-04
Payer: COMMERCIAL

## 2022-08-04 VITALS
TEMPERATURE: 97.5 F | DIASTOLIC BLOOD PRESSURE: 84 MMHG | BODY MASS INDEX: 41.66 KG/M2 | SYSTOLIC BLOOD PRESSURE: 146 MMHG | OXYGEN SATURATION: 96 % | HEART RATE: 74 BPM | HEIGHT: 70 IN | WEIGHT: 291 LBS | RESPIRATION RATE: 22 BRPM

## 2022-08-04 DIAGNOSIS — M79.671 RIGHT FOOT PAIN: Primary | ICD-10-CM

## 2022-08-04 DIAGNOSIS — M79.604 RIGHT LEG PAIN: ICD-10-CM

## 2022-08-04 PROCEDURE — 1036F TOBACCO NON-USER: CPT | Performed by: PHYSICIAN ASSISTANT

## 2022-08-04 PROCEDURE — G8427 DOCREV CUR MEDS BY ELIG CLIN: HCPCS | Performed by: PHYSICIAN ASSISTANT

## 2022-08-04 PROCEDURE — G8417 CALC BMI ABV UP PARAM F/U: HCPCS | Performed by: PHYSICIAN ASSISTANT

## 2022-08-04 PROCEDURE — 99214 OFFICE O/P EST MOD 30 MIN: CPT | Performed by: PHYSICIAN ASSISTANT

## 2022-08-04 PROCEDURE — 3017F COLORECTAL CA SCREEN DOC REV: CPT | Performed by: PHYSICIAN ASSISTANT

## 2022-08-04 NOTE — PROGRESS NOTES
22  Jus Bruno : 1965 Sex: female  Age 61 y.o. Subjective:  Chief Complaint   Patient presents with    Foot Pain     Right, fell on  bruised shin         HPI:   Jus Bruno , 61 y.o. female presents to express care for evaluation of right leg pain, swelling, foot pain    HPI  74-year-old female presents to express care for evaluation of right leg pain, swelling, right foot pain and swelling. The patient has noted the symptoms ongoing for the last couple of days. The patient states on  she was trying to get out of the boat and cannot quite get out and fell backwards and hit her leg on the boat. The patient has had a swelling and hematoma noted to the medial aspect of the right calf. There is some concern for the possibility of a blood clot. Patient was told to come in for further evaluation. The patient has no evidence of lymphangitic streaking. The patient has no evidence of cyanosis or mottling. ROS:   Unless otherwise stated in this report the patient's positive and negative responses for review of systems for constitutional, eyes, ENT, cardiovascular, respiratory, gastrointestinal, neurological, , musculoskeletal, and integument systems and related systems to the presenting problem are either stated in the history of present illness or were not pertinent or were negative for the symptoms and/or complaints related to the presenting medical problem. Positives and pertinent negatives as per HPI. All others reviewed and are negative.       PMH:     Past Medical History:   Diagnosis Date    Arthritis     Asthma     Cardiomyopathy ([facility] [address] 2020    Kidney stone 1/10/2016    LBBB (left bundle branch block) 3/10/2020    Rheumatoid arthritis of multiple sites with negative rheumatoid factor ([facility] [address] 2019       Past Surgical History:   Procedure Laterality Date    CHOLECYSTECTOMY      CYSTOSCOPY  01/10/2015    uretroscopy,lithotripsy,stent placement ENDOMETRIAL ABLATION         Family History   Problem Relation Age of Onset    Rheum Arthritis Mother     Heart Failure Mother     Colon Cancer Father        Medications:     Current Outpatient Medications:     predniSONE (DELTASONE) 10 MG tablet, Take 0.5 tablets by mouth daily, Disp: 30 tablet, Rfl: 1    azithromycin (ZITHROMAX Z-VAL) 250 MG tablet, Use as directed, Disp: 6 tablet, Rfl: 0    metoprolol succinate (TOPROL XL) 100 MG extended release tablet, Take 1 tablet by mouth daily, Disp: 30 tablet, Rfl: 5    pantoprazole (PROTONIX) 40 MG tablet, Take 1 tablet by mouth daily, Disp: 30 tablet, Rfl: 5    solifenacin (VESICARE) 10 MG tablet, Take 1 tablet by mouth daily, Disp: 30 tablet, Rfl: 5    fluticasone-salmeterol (ADVAIR) 250-50 MCG/DOSE AEPB, Inhale 1 puff into the lungs every 12 hours, Disp: 60 each, Rfl: 3    hydroxychloroquine (PLAQUENIL) 200 MG tablet, Take 2 tablets by mouth 2 times daily, Disp: 180 tablet, Rfl: 0    furosemide (LASIX) 40 MG tablet, Take 1 tablet by mouth daily as needed (edema/weight gain), Disp: 30 tablet, Rfl: 5    potassium chloride (KLOR-CON M) 20 MEQ extended release tablet, Take 2 tablets by mouth daily (with breakfast), Disp: 60 tablet, Rfl: 5    meloxicam (MOBIC) 15 MG tablet, Take 15 mg by mouth as needed , Disp: , Rfl:     valsartan (DIOVAN) 80 MG tablet, Take 1 tablet by mouth 2 times daily, Disp: 60 tablet, Rfl: 5    albuterol (PROVENTIL) (2.5 MG/3ML) 0.083% nebulizer solution, Take 2.5 mg by nebulization every 6 hours as needed for Wheezing, Disp: , Rfl:     diclofenac sodium 1 % GEL, Apply 4 g topically 4 times daily (Patient taking differently: Apply 4 g topically 4 times daily as needed ), Disp: 4 Tube, Rfl: 1    Adalimumab 40 MG/0.4ML PNKT, Inject 40 mg Sub Q every other week - citrate free, Disp: 2 each, Rfl: 2    albuterol sulfate  (90 Base) MCG/ACT inhaler, Inhale 2 puffs into the lungs 4 times daily as needed for Wheezing, Disp: 1 Inhaler, Rfl: 0 Cholecalciferol (VITAMIN D) 2000 units CAPS capsule, Take by mouth, Disp: , Rfl:     cetirizine (ZYRTEC) 10 MG tablet, Take 10 mg by mouth daily, Disp: , Rfl:     Allergies: Allergies   Allergen Reactions    Penicillins        Social History:     Social History     Tobacco Use    Smoking status: Never    Smokeless tobacco: Never   Vaping Use    Vaping Use: Never used   Substance Use Topics    Alcohol use: Yes     Comment: socially    Drug use: No       Patient lives at home. Physical Exam:     Vitals:    08/04/22 1409   BP: (!) 146/84   Site: Right Upper Arm   Position: Sitting   Cuff Size: Medium Adult   Pulse: 74   Resp: 22   Temp: 97.5 °F (36.4 °C)   TempSrc: Temporal   SpO2: 96%   Weight: 291 lb (132 kg)   Height: 5' 10\" (1.778 m)       Exam:  Physical Exam  Nurses note and vital signs reviewed and patient is not hypoxic. General: The patient appears well and in no apparent distress. Patient is resting comfortably on cart. Skin: Warm, dry, no pallor noted. There is no rash noted. Head: Normocephalic, atraumatic. Eye: Normal conjunctiva  Ears, Nose, Mouth, and Throat: Wearing a mask  Cardiovascular: Regular Rate and Rhythm  Respiratory: Patient is in no distress, no accessory muscle use, no audible wheezing  Back: Non-tender, no CVA tenderness bilaterally to percussion. Musculoskeletal: The patient does have swelling to the right lower extremity. The patient does have evidence of what appears to be a hematoma to the medial aspect of the right calf. Tenderness over this area. The patient had no erythema, warmth. The patient does have some swelling to the foot there is also some bruising noted to the medial aspect of the calcaneal area of the foot. The patient does have some swelling over the dorsum of the foot. Pulses intact the DP/PT 2+. No cyanosis or mottling. The patient had no significant posterior calf pain.   Neurological: A&O x4, normal speech      Testing:           Medical Decision

## 2022-08-05 ENCOUNTER — PATIENT MESSAGE (OUTPATIENT)
Dept: PRIMARY CARE CLINIC | Age: 57
End: 2022-08-05

## 2022-08-05 RX ORDER — POTASSIUM CHLORIDE 20 MEQ/1
40 TABLET, EXTENDED RELEASE ORAL
Qty: 60 TABLET | Refills: 5 | Status: SHIPPED | OUTPATIENT
Start: 2022-08-05

## 2022-08-05 NOTE — TELEPHONE ENCOUNTER
From: Jackson Sky  To: Dr. Jigna Carty  Sent: 8/5/2022 10:50 AM EDT  Subject: Need refill     Good morning     I need a refill on potassium. I think my refill request was going to Dr Kesha Smith but I go to [facility] now. Can you place a refill for me?     Thanks   Emmie Bosch

## 2022-08-29 DIAGNOSIS — M06.09 RHEUMATOID ARTHRITIS OF MULTIPLE SITES WITH NEGATIVE RHEUMATOID FACTOR (HCC): ICD-10-CM

## 2022-08-29 DIAGNOSIS — N32.81 OAB (OVERACTIVE BLADDER): ICD-10-CM

## 2022-08-29 RX ORDER — VALSARTAN 80 MG/1
80 TABLET ORAL 2 TIMES DAILY
Qty: 60 TABLET | Refills: 5 | OUTPATIENT
Start: 2022-08-29

## 2022-08-29 RX ORDER — SOLIFENACIN SUCCINATE 10 MG/1
10 TABLET, FILM COATED ORAL DAILY
Qty: 30 TABLET | Refills: 5 | Status: SHIPPED | OUTPATIENT
Start: 2022-08-29

## 2022-08-29 RX ORDER — HYDROXYCHLOROQUINE SULFATE 200 MG/1
400 TABLET, FILM COATED ORAL 2 TIMES DAILY
Qty: 180 TABLET | Refills: 0 | Status: SHIPPED | OUTPATIENT
Start: 2022-08-29

## 2022-08-29 RX ORDER — METOPROLOL SUCCINATE 100 MG/1
100 TABLET, EXTENDED RELEASE ORAL DAILY
Qty: 30 TABLET | Refills: 5 | Status: SHIPPED | OUTPATIENT
Start: 2022-08-29

## 2022-09-26 ENCOUNTER — TELEPHONE (OUTPATIENT)
Dept: PODIATRY | Age: 57
End: 2022-09-26

## 2022-09-26 RX ORDER — ALBUTEROL SULFATE 90 UG/1
2 AEROSOL, METERED RESPIRATORY (INHALATION) 4 TIMES DAILY PRN
Qty: 1 EACH | Refills: 0 | Status: SHIPPED | OUTPATIENT
Start: 2022-09-26

## 2022-12-05 ENCOUNTER — OFFICE VISIT (OUTPATIENT)
Dept: FAMILY MEDICINE CLINIC | Age: 57
End: 2022-12-05
Payer: COMMERCIAL

## 2022-12-05 VITALS
RESPIRATION RATE: 20 BRPM | HEART RATE: 92 BPM | WEIGHT: 283 LBS | TEMPERATURE: 98 F | SYSTOLIC BLOOD PRESSURE: 118 MMHG | OXYGEN SATURATION: 97 % | HEIGHT: 70 IN | DIASTOLIC BLOOD PRESSURE: 82 MMHG | BODY MASS INDEX: 40.52 KG/M2

## 2022-12-05 DIAGNOSIS — J45.901 EXACERBATION OF ASTHMA, UNSPECIFIED ASTHMA SEVERITY, UNSPECIFIED WHETHER PERSISTENT: ICD-10-CM

## 2022-12-05 DIAGNOSIS — R05.1 ACUTE COUGH: ICD-10-CM

## 2022-12-05 DIAGNOSIS — J22 LOWER RESPIRATORY INFECTION: Primary | ICD-10-CM

## 2022-12-05 PROCEDURE — G8427 DOCREV CUR MEDS BY ELIG CLIN: HCPCS

## 2022-12-05 PROCEDURE — 3017F COLORECTAL CA SCREEN DOC REV: CPT

## 2022-12-05 PROCEDURE — G8484 FLU IMMUNIZE NO ADMIN: HCPCS

## 2022-12-05 PROCEDURE — 99214 OFFICE O/P EST MOD 30 MIN: CPT

## 2022-12-05 PROCEDURE — G8417 CALC BMI ABV UP PARAM F/U: HCPCS

## 2022-12-05 PROCEDURE — 1036F TOBACCO NON-USER: CPT

## 2022-12-05 RX ORDER — PREDNISONE 10 MG/1
TABLET ORAL
Qty: 18 TABLET | Refills: 0 | Status: SHIPPED | OUTPATIENT
Start: 2022-12-05 | End: 2022-12-14

## 2022-12-05 RX ORDER — AZITHROMYCIN 250 MG/1
TABLET, FILM COATED ORAL
Qty: 6 TABLET | Refills: 0 | Status: SHIPPED | OUTPATIENT
Start: 2022-12-05

## 2022-12-05 NOTE — PROGRESS NOTES
Chief Complaint   Cough (Home test negative COVID), Congestion, and Wheezing    History of Present Illness   Source of history provided by:  patient. Enzo Payne is a 61 y.o. old female presenting to the walk in clinic for evaluation of a non-productive cough, chest congestion, wheezing x 3 days. Reports mild nasal congestion, nasal drainage, and sore throat. Has been taking Mucinex and nebulizer with some relief. Denies any fever, chills, CP, SOB, or GI symptoms. Reports history of asthma. Denies any contact with any individuals with known COVID-19 infection or under investigation for COVID-19 infection. Pt did test negative for COVID-19 at home. ROS    Unless otherwise stated in this report or unable to obtain because of the patient's clinical or mental status as evidenced by the medical record, this patients's positive and negative responses for Review of Systems, constitutional, psych, eyes, ENT, cardiovascular, respiratory, gastrointestinal, neurological, genitourinary, musculoskeletal, integument systems and systems related to the presenting problem are either stated in the preceding or were not pertinent or were negative for the symptoms and/or complaints related to the medical problem. Physical Exam         VS:  /82 (Site: Right Upper Arm, Position: Sitting, Cuff Size: Large Adult)   Pulse 92   Temp 98 °F (36.7 °C) (Temporal)   Resp 20   Ht 5' 10\" (1.778 m)   Wt 283 lb (128.4 kg)   SpO2 97%   BMI 40.61 kg/m²    Oxygen Saturation Interpretation: Normal.    Constitutional:  Alert, development consistent with age. Head: Mild TTP over the sinuses. Ears:  External Ears: Bilateral pinna normal. TMs translucent without erythema or perforation bilaterally. Canals normal bilaterally without swelling or exudate  Nose:  Mild congestion of the nasal mucosa. Throat: Mild posterior pharyngeal erythema with mild post nasal drip present. No exudate or tonsillar hypertrophy noted. Neck:  Supple. There is no anterior cervical adenopathy. Lungs: Faint end expiratory wheezes throughout bilateral lung fields. No rales, or rhonchi. Cough is harsh and dry. Heart:  Regular rate and rhythm, normal heart sounds, without pathological murmurs, ectopy, gallops, or rubs. Skin:  Normal turgor. Warm, dry, without visible rash. Neurological:  Alert and oriented. Motor functions intact. Responds to verbal commands. Lab / Imaging Results   (All laboratory and radiology results have been personally reviewed by myself)  Labs:  No results found for this visit on 12/05/22. Assessment / Plan     Impression(s):  Darian Glass was seen today for cough, congestion and wheezing. Diagnoses and all orders for this visit:    Lower respiratory infection  -     XR CHEST (2 VW); Future  -     azithromycin (ZITHROMAX) 250 MG tablet; Take 2 tablets by mouth on day 1, Take 1 tablet by mouth on days 2-5  -     predniSONE (DELTASONE) 10 MG tablet; Take 3 tablets by mouth daily for 3 days, THEN 2 tablets daily for 3 days, THEN 1 tablet daily for 3 days. Acute cough  -     XR CHEST (2 VW); Future    Exacerbation of asthma, unspecified asthma severity, unspecified whether persistent  -     predniSONE (DELTASONE) 10 MG tablet; Take 3 tablets by mouth daily for 3 days, THEN 2 tablets daily for 3 days, THEN 1 tablet daily for 3 days. Disposition:  Disposition: Discharge to home. Order given for chest XR, no acute process seen. Script written for azithromycin and prednisone, side effects discussed. Increase fluids and rest. Symptomatic relief discussed. F/u PCP in 5-7 days if symptoms persist. ED sooner if symptoms worsen or change. Red flag symptoms discussed. Pt is in agreement with this care plan. All questions answered. TALA Doe    **This report was transcribed using voice recognition software.  Every effort was made to ensure accuracy; however, inadvertent computerized transcription errors may be present.

## 2022-12-11 ENCOUNTER — PATIENT MESSAGE (OUTPATIENT)
Dept: PRIMARY CARE CLINIC | Age: 57
End: 2022-12-11

## 2022-12-12 RX ORDER — FUROSEMIDE 40 MG/1
40 TABLET ORAL DAILY PRN
Qty: 30 TABLET | Refills: 5 | Status: SHIPPED | OUTPATIENT
Start: 2022-12-12

## 2022-12-12 NOTE — TELEPHONE ENCOUNTER
From: Dylan Avendano  To: Dr. Cynthia Sousa  Sent: 12/11/2022 3:46 PM EST  Subject: Refill Lasix    Hi Dr. Yaya Escobar,    I need a refill of furosemide 40 mg tablet. Thank you.     Darian Glass

## 2022-12-16 ENCOUNTER — OFFICE VISIT (OUTPATIENT)
Dept: PRIMARY CARE CLINIC | Age: 57
End: 2022-12-16
Payer: COMMERCIAL

## 2022-12-16 VITALS
OXYGEN SATURATION: 96 % | TEMPERATURE: 97.5 F | WEIGHT: 285 LBS | RESPIRATION RATE: 18 BRPM | HEIGHT: 70 IN | DIASTOLIC BLOOD PRESSURE: 84 MMHG | SYSTOLIC BLOOD PRESSURE: 126 MMHG | HEART RATE: 86 BPM | BODY MASS INDEX: 40.8 KG/M2

## 2022-12-16 DIAGNOSIS — J45.31 MILD PERSISTENT ASTHMATIC BRONCHITIS WITH ACUTE EXACERBATION: Primary | ICD-10-CM

## 2022-12-16 DIAGNOSIS — R05.1 ACUTE COUGH: ICD-10-CM

## 2022-12-16 LAB
INFLUENZA A ANTIBODY: NORMAL
INFLUENZA B ANTIBODY: NORMAL
Lab: NORMAL
PERFORMING INSTRUMENT: NORMAL
QC PASS/FAIL: NORMAL
SARS-COV-2, POC: NORMAL

## 2022-12-16 PROCEDURE — 1036F TOBACCO NON-USER: CPT | Performed by: FAMILY MEDICINE

## 2022-12-16 PROCEDURE — 87426 SARSCOV CORONAVIRUS AG IA: CPT | Performed by: FAMILY MEDICINE

## 2022-12-16 PROCEDURE — 99213 OFFICE O/P EST LOW 20 MIN: CPT | Performed by: FAMILY MEDICINE

## 2022-12-16 PROCEDURE — G8427 DOCREV CUR MEDS BY ELIG CLIN: HCPCS | Performed by: FAMILY MEDICINE

## 2022-12-16 PROCEDURE — 3017F COLORECTAL CA SCREEN DOC REV: CPT | Performed by: FAMILY MEDICINE

## 2022-12-16 PROCEDURE — G8484 FLU IMMUNIZE NO ADMIN: HCPCS | Performed by: FAMILY MEDICINE

## 2022-12-16 PROCEDURE — G8417 CALC BMI ABV UP PARAM F/U: HCPCS | Performed by: FAMILY MEDICINE

## 2022-12-16 PROCEDURE — 87804 INFLUENZA ASSAY W/OPTIC: CPT | Performed by: FAMILY MEDICINE

## 2022-12-16 RX ORDER — IPRATROPIUM BROMIDE AND ALBUTEROL SULFATE 2.5; .5 MG/3ML; MG/3ML
SOLUTION RESPIRATORY (INHALATION)
COMMUNITY
Start: 2022-12-10

## 2022-12-16 RX ORDER — BENZONATATE 100 MG/1
100 CAPSULE ORAL 3 TIMES DAILY PRN
Qty: 30 CAPSULE | Refills: 0 | Status: SHIPPED | OUTPATIENT
Start: 2022-12-16 | End: 2022-12-23

## 2022-12-16 RX ORDER — SPIRONOLACTONE 25 MG/1
TABLET ORAL
COMMUNITY
Start: 2022-12-01

## 2022-12-16 RX ORDER — PREDNISONE 20 MG/1
TABLET ORAL
Qty: 11 TABLET | Refills: 0 | Status: SHIPPED | OUTPATIENT
Start: 2022-12-16

## 2022-12-16 RX ORDER — SACUBITRIL AND VALSARTAN 49; 51 MG/1; MG/1
TABLET, FILM COATED ORAL
COMMUNITY
Start: 2022-12-15

## 2022-12-16 RX ORDER — LEVOFLOXACIN 500 MG/1
500 TABLET, FILM COATED ORAL DAILY
Qty: 10 TABLET | Refills: 0 | Status: SHIPPED | OUTPATIENT
Start: 2022-12-16 | End: 2022-12-26

## 2022-12-16 ASSESSMENT — ENCOUNTER SYMPTOMS
ALLERGIC/IMMUNOLOGIC NEGATIVE: 1
PHOTOPHOBIA: 0
APNEA: 0
SHORTNESS OF BREATH: 0
VOMITING: 0
FACIAL SWELLING: 0
WHEEZING: 1
BLOOD IN STOOL: 0
CHEST TIGHTNESS: 0
COLOR CHANGE: 0
BACK PAIN: 0
ABDOMINAL PAIN: 0
NAUSEA: 0
DIARRHEA: 0
COUGH: 1
SINUS PRESSURE: 0
SORE THROAT: 0

## 2022-12-16 NOTE — PROGRESS NOTES
Chief Complaint:   Chief Complaint   Patient presents with    Asthma     Following up from express        Asthma  She complains of cough and wheezing. There is no shortness of breath. The current episode started 1 to 4 weeks ago. The problem occurs daily. The problem has been waxing and waning. Pertinent negatives include no chest pain, headaches, myalgias or sore throat. Her symptoms are alleviated by steroid inhaler and beta-agonist. She reports significant improvement on treatment. Her past medical history is significant for asthma.      Patient Active Problem List   Diagnosis    Arthritis    Asthma    Acute cystitis without hematuria    Viral URI    Bronchitis    Rheumatoid arthritis of multiple sites with negative rheumatoid factor ([address]    Tachycardia    Cardiomyopathy ([facility] [address]    LBBB (left bundle branch block)    COVID-19    Pneumonia due to COVID-19 virus    Hypokalemia    Rheumatoid arthritis ([address]       Past Medical History:   Diagnosis Date    Arthritis     Asthma     Cardiomyopathy ([facility] [address] 2/20/2020    Kidney stone 1/10/2016    LBBB (left bundle branch block) 3/10/2020    Rheumatoid arthritis of multiple sites with negative rheumatoid factor ([facility] [address] 8/14/2019       Past Surgical History:   Procedure Laterality Date    CHOLECYSTECTOMY      CYSTOSCOPY  01/10/2015    uretroscopy,lithotripsy,stent placement    ENDOMETRIAL ABLATION         Current Outpatient Medications   Medication Sig Dispense Refill    predniSONE (DELTASONE) 20 MG tablet 2 tabs qd x 3 days  1 tab qd x 3 days 0.5 tab qd x 3 days 11 tablet 0    levoFLOXacin (LEVAQUIN) 500 MG tablet Take 1 tablet by mouth daily for 10 days 10 tablet 0    benzonatate (TESSALON) 100 MG capsule Take 1 capsule by mouth 3 times daily as needed for Cough 30 capsule 0    ipratropium-albuterol (DUONEB) 0.5-2.5 (3) MG/3ML SOLN nebulizer solution INHALE THE CONTENTS OF ONE VIAL VIA NEBULIZER EVERY 4 TO 6 HOURS      spironolactone (ALDACTONE) 25 MG tablet TAKE 1 TABLET BY MOUTH ONCE DAILY      ENTRESTO 49-51 MG per tablet       furosemide (LASIX) 40 MG tablet Take 1 tablet by mouth daily as needed (edema/weight gain) 30 tablet 5    azithromycin (ZITHROMAX) 250 MG tablet Take 2 tablets by mouth on day 1, Take 1 tablet by mouth on days 2-5 6 tablet 0    albuterol sulfate HFA (PROVENTIL;VENTOLIN;PROAIR) 108 (90 Base) MCG/ACT inhaler Inhale 2 puffs into the lungs 4 times daily as needed for Wheezing 1 each 0    fluticasone-salmeterol (ADVAIR) 250-50 MCG/DOSE AEPB Inhale 1 puff into the lungs in the morning and 1 puff in the evening.  60 each 3    diclofenac sodium (VOLTAREN) 1 % GEL Apply 4 g topically 4 times daily 350 g 5    hydroxychloroquine (PLAQUENIL) 200 MG tablet Take 2 tablets by mouth 2 times daily 180 tablet 0    solifenacin (VESICARE) 10 MG tablet Take 1 tablet by mouth daily 30 tablet 5    metoprolol succinate (TOPROL XL) 100 MG extended release tablet Take 1 tablet by mouth daily 30 tablet 5    potassium chloride (KLOR-CON M) 20 MEQ extended release tablet Take 2 tablets by mouth daily (with breakfast) 60 tablet 5    predniSONE (DELTASONE) 10 MG tablet Take 0.5 tablets by mouth daily 30 tablet 1    pantoprazole (PROTONIX) 40 MG tablet Take 1 tablet by mouth daily 30 tablet 5    meloxicam (MOBIC) 15 MG tablet Take 15 mg by mouth as needed       valsartan (DIOVAN) 80 MG tablet Take 1 tablet by mouth 2 times daily 60 tablet 5    albuterol (PROVENTIL) (2.5 MG/3ML) 0.083% nebulizer solution Take 2.5 mg by nebulization every 6 hours as needed for Wheezing      diclofenac sodium 1 % GEL Apply 4 g topically 4 times daily (Patient taking differently: Apply 4 g topically 4 times daily as needed ) 4 Tube 1    Adalimumab 40 MG/0.4ML PNKT Inject 40 mg Sub Q every other week - citrate free 2 each 2    Cholecalciferol (VITAMIN D) 2000 units CAPS capsule Take by mouth      cetirizine (ZYRTEC) 10 MG tablet Take 10 mg by mouth daily       No current facility-administered medications for this visit. Allergies   Allergen Reactions    Penicillins        Social History     Socioeconomic History    Marital status:      Spouse name: None    Number of children: None    Years of education: None    Highest education level: None   Occupational History     Employer: [employer]   Tobacco Use    Smoking status: Never    Smokeless tobacco: Never   Vaping Use    Vaping Use: Never used   Substance and Sexual Activity    Alcohol use: Yes     Comment: socially    Drug use: No    Sexual activity: Yes     Partners: Male       Family History   Problem Relation Age of Onset    Rheum Arthritis Mother     Heart Failure Mother     Colon Cancer Father          Review of Systems   Constitutional: Negative. HENT:  Negative for congestion, facial swelling, hearing loss, nosebleeds, sinus pressure and sore throat. Eyes:  Negative for photophobia and visual disturbance. Respiratory:  Positive for cough and wheezing. Negative for apnea, chest tightness and shortness of breath. Cardiovascular:  Negative for chest pain, palpitations and leg swelling. Gastrointestinal:  Negative for abdominal pain, blood in stool, diarrhea, nausea and vomiting. Genitourinary:  Negative for difficulty urinating, frequency and urgency. Musculoskeletal:  Negative for arthralgias, back pain, joint swelling and myalgias. Skin:  Negative for color change and rash. Allergic/Immunologic: Negative. Neurological:  Negative for syncope, weakness, light-headedness and headaches. Hematological: Negative. Psychiatric/Behavioral:  Negative for agitation, behavioral problems, confusion and self-injury. The patient is not nervous/anxious. All other systems reviewed and are negative. Physical Exam  Vitals and nursing note reviewed. Constitutional:       General: She is not in acute distress. Appearance: She is well-developed. HENT:      Head: Normocephalic and atraumatic.       Nose: Nose normal. Eyes:      Conjunctiva/sclera: Conjunctivae normal.      Pupils: Pupils are equal, round, and reactive to light. Neck:      Thyroid: No thyromegaly. Vascular: No JVD. Cardiovascular:      Rate and Rhythm: Normal rate and regular rhythm. Heart sounds: Normal heart sounds. No murmur heard. No friction rub. No gallop. Pulmonary:      Effort: Pulmonary effort is normal. No respiratory distress. Breath sounds: Wheezing and rhonchi present. Abdominal:      General: Bowel sounds are normal. There is no distension. Palpations: Abdomen is soft. Tenderness: There is no abdominal tenderness. There is no guarding or rebound. Musculoskeletal:         General: Normal range of motion. Cervical back: Normal range of motion and neck supple. Lymphadenopathy:      Cervical: No cervical adenopathy. Skin:     General: Skin is warm and dry. Findings: No erythema or rash. Neurological:      Mental Status: She is alert and oriented to person, place, and time. Cranial Nerves: No cranial nerve deficit. Motor: No abnormal muscle tone. Coordination: Coordination normal.      Deep Tendon Reflexes: Reflexes are normal and symmetric. Psychiatric:         Behavior: Behavior normal.         Judgment: Judgment normal.                             ASSESSMENT/PLAN:    Shelby Kearney was seen today for asthma. Diagnoses and all orders for this visit:    Mild persistent asthmatic bronchitis with acute exacerbation  -     predniSONE (DELTASONE) 20 MG tablet; 2 tabs qd x 3 days  1 tab qd x 3 days 0.5 tab qd x 3 days  -     levoFLOXacin (LEVAQUIN) 500 MG tablet; Take 1 tablet by mouth daily for 10 days    Acute cough  -     benzonatate (TESSALON) 100 MG capsule;  Take 1 capsule by mouth 3 times daily as needed for Cough  -     POCT COVID-19, Antigen  -     POCT Influenza A/B          Liss Thornton DO    12/16/2022  1:15 PM

## 2022-12-23 ENCOUNTER — COMMUNITY OUTREACH (OUTPATIENT)
Dept: PRIMARY CARE CLINIC | Age: 57
End: 2022-12-23

## 2023-01-02 DIAGNOSIS — M06.09 RHEUMATOID ARTHRITIS OF MULTIPLE SITES WITH NEGATIVE RHEUMATOID FACTOR (HCC): ICD-10-CM

## 2023-01-03 RX ORDER — HYDROXYCHLOROQUINE SULFATE 200 MG/1
400 TABLET, FILM COATED ORAL 2 TIMES DAILY
Qty: 180 TABLET | Refills: 0 | Status: SHIPPED | OUTPATIENT
Start: 2023-01-03

## 2023-01-03 RX ORDER — HYDROXYCHLOROQUINE SULFATE 200 MG/1
TABLET, FILM COATED ORAL
Qty: 180 TABLET | Refills: 0 | Status: SHIPPED | OUTPATIENT
Start: 2023-01-03

## 2023-01-04 ENCOUNTER — PATIENT MESSAGE (OUTPATIENT)
Dept: PRIMARY CARE CLINIC | Age: 58
End: 2023-01-04

## 2023-01-04 DIAGNOSIS — J45.31 MILD PERSISTENT ASTHMATIC BRONCHITIS WITH ACUTE EXACERBATION: ICD-10-CM

## 2023-01-05 RX ORDER — PREDNISONE 20 MG/1
TABLET ORAL
Qty: 11 TABLET | Refills: 0 | Status: SHIPPED | OUTPATIENT
Start: 2023-01-05

## 2023-01-05 RX ORDER — IPRATROPIUM BROMIDE AND ALBUTEROL SULFATE 2.5; .5 MG/3ML; MG/3ML
SOLUTION RESPIRATORY (INHALATION)
Qty: 360 ML | Refills: 2 | Status: SHIPPED | OUTPATIENT
Start: 2023-01-05

## 2023-01-05 NOTE — TELEPHONE ENCOUNTER
From: Marta Vaz  To: Dr. Charlene Napier  Sent: 1/4/2023 11:19 PM EST  Subject: Refill    Hi Dr Melba Richard,    Im still coughing and wheezing and all 10 day meds were finished about a week ago. Its the same upper respiratory wheeze like before. No fever or other symptoms. Well now had given me DuoMed for the nebulizer. Can you call in that or something that might help with the wheeze or barky cough to [facility] Dr. Reginald Boles you.

## 2023-03-08 DIAGNOSIS — N32.81 OAB (OVERACTIVE BLADDER): ICD-10-CM

## 2023-03-08 DIAGNOSIS — M06.09 RHEUMATOID ARTHRITIS OF MULTIPLE SITES WITH NEGATIVE RHEUMATOID FACTOR (HCC): ICD-10-CM

## 2023-03-08 RX ORDER — SOLIFENACIN SUCCINATE 10 MG/1
10 TABLET, FILM COATED ORAL DAILY
Qty: 30 TABLET | Refills: 5 | Status: SHIPPED | OUTPATIENT
Start: 2023-03-08

## 2023-03-08 RX ORDER — HYDROXYCHLOROQUINE SULFATE 200 MG/1
400 TABLET, FILM COATED ORAL 2 TIMES DAILY
Qty: 180 TABLET | Refills: 0 | Status: SHIPPED | OUTPATIENT
Start: 2023-03-08

## 2023-03-08 RX ORDER — PREDNISONE 10 MG/1
5 TABLET ORAL DAILY
Qty: 30 TABLET | Refills: 1 | Status: SHIPPED | OUTPATIENT
Start: 2023-03-08

## 2023-03-08 RX ORDER — PANTOPRAZOLE SODIUM 40 MG/1
40 TABLET, DELAYED RELEASE ORAL DAILY
Qty: 30 TABLET | Refills: 5 | Status: SHIPPED | OUTPATIENT
Start: 2023-03-08

## 2023-03-08 RX ORDER — METOPROLOL SUCCINATE 100 MG/1
100 TABLET, EXTENDED RELEASE ORAL DAILY
Qty: 30 TABLET | Refills: 5 | Status: SHIPPED | OUTPATIENT
Start: 2023-03-08

## 2023-06-05 ENCOUNTER — OFFICE VISIT (OUTPATIENT)
Dept: PRIMARY CARE CLINIC | Age: 58
End: 2023-06-05
Payer: COMMERCIAL

## 2023-06-05 VITALS
OXYGEN SATURATION: 97 % | BODY MASS INDEX: 41.37 KG/M2 | WEIGHT: 289 LBS | DIASTOLIC BLOOD PRESSURE: 84 MMHG | HEART RATE: 85 BPM | HEIGHT: 70 IN | RESPIRATION RATE: 18 BRPM | TEMPERATURE: 97.6 F | SYSTOLIC BLOOD PRESSURE: 126 MMHG

## 2023-06-05 DIAGNOSIS — M06.09 RHEUMATOID ARTHRITIS OF MULTIPLE SITES WITH NEGATIVE RHEUMATOID FACTOR (HCC): ICD-10-CM

## 2023-06-05 DIAGNOSIS — M25.50 ARTHRALGIA, UNSPECIFIED JOINT: ICD-10-CM

## 2023-06-05 DIAGNOSIS — F41.8 DEPRESSION WITH ANXIETY: ICD-10-CM

## 2023-06-05 PROCEDURE — G8417 CALC BMI ABV UP PARAM F/U: HCPCS | Performed by: FAMILY MEDICINE

## 2023-06-05 PROCEDURE — 3017F COLORECTAL CA SCREEN DOC REV: CPT | Performed by: FAMILY MEDICINE

## 2023-06-05 PROCEDURE — 1036F TOBACCO NON-USER: CPT | Performed by: FAMILY MEDICINE

## 2023-06-05 PROCEDURE — 99214 OFFICE O/P EST MOD 30 MIN: CPT | Performed by: FAMILY MEDICINE

## 2023-06-05 PROCEDURE — G8427 DOCREV CUR MEDS BY ELIG CLIN: HCPCS | Performed by: FAMILY MEDICINE

## 2023-06-05 RX ORDER — PREDNISONE 20 MG/1
TABLET ORAL
Qty: 11 TABLET | Refills: 0 | Status: SHIPPED | OUTPATIENT
Start: 2023-06-05

## 2023-06-05 RX ORDER — ESCITALOPRAM OXALATE 10 MG/1
10 TABLET ORAL DAILY
Qty: 30 TABLET | Refills: 3 | Status: SHIPPED | OUTPATIENT
Start: 2023-06-05

## 2023-06-05 SDOH — ECONOMIC STABILITY: HOUSING INSECURITY
IN THE LAST 12 MONTHS, WAS THERE A TIME WHEN YOU DID NOT HAVE A STEADY PLACE TO SLEEP OR SLEPT IN A SHELTER (INCLUDING NOW)?: NO

## 2023-06-05 SDOH — ECONOMIC STABILITY: FOOD INSECURITY: WITHIN THE PAST 12 MONTHS, THE FOOD YOU BOUGHT JUST DIDN'T LAST AND YOU DIDN'T HAVE MONEY TO GET MORE.: NEVER TRUE

## 2023-06-05 SDOH — ECONOMIC STABILITY: FOOD INSECURITY: WITHIN THE PAST 12 MONTHS, YOU WORRIED THAT YOUR FOOD WOULD RUN OUT BEFORE YOU GOT MONEY TO BUY MORE.: NEVER TRUE

## 2023-06-05 SDOH — ECONOMIC STABILITY: INCOME INSECURITY: HOW HARD IS IT FOR YOU TO PAY FOR THE VERY BASICS LIKE FOOD, HOUSING, MEDICAL CARE, AND HEATING?: NOT HARD AT ALL

## 2023-06-05 ASSESSMENT — ENCOUNTER SYMPTOMS
BLOOD IN STOOL: 0
COUGH: 0
NAUSEA: 0
PHOTOPHOBIA: 0
FACIAL SWELLING: 0
ALLERGIC/IMMUNOLOGIC NEGATIVE: 1
ABDOMINAL PAIN: 0
DIARRHEA: 0
WHEEZING: 0
SINUS PRESSURE: 0
APNEA: 0
BACK PAIN: 0
COLOR CHANGE: 0
SHORTNESS OF BREATH: 0
SORE THROAT: 0
VOMITING: 0
CHEST TIGHTNESS: 0

## 2023-06-05 ASSESSMENT — PATIENT HEALTH QUESTIONNAIRE - PHQ9
1. LITTLE INTEREST OR PLEASURE IN DOING THINGS: 0
SUM OF ALL RESPONSES TO PHQ QUESTIONS 1-9: 0
2. FEELING DOWN, DEPRESSED OR HOPELESS: 0
SUM OF ALL RESPONSES TO PHQ9 QUESTIONS 1 & 2: 0
SUM OF ALL RESPONSES TO PHQ QUESTIONS 1-9: 0

## 2023-06-05 NOTE — PROGRESS NOTES
daily as needed ) 4 Tube 1    Adalimumab 40 MG/0.4ML PNKT Inject 40 mg Sub Q every other week - citrate free 2 each 2    Cholecalciferol (VITAMIN D) 2000 units CAPS capsule Take by mouth      cetirizine (ZYRTEC) 10 MG tablet Take 10 mg by mouth daily       No current facility-administered medications for this visit. Allergies   Allergen Reactions    Penicillins        Social History     Socioeconomic History    Marital status:      Spouse name: None    Number of children: None    Years of education: None    Highest education level: None   Occupational History     Employer: [employer]   Tobacco Use    Smoking status: Never    Smokeless tobacco: Never   Vaping Use    Vaping Use: Never used   Substance and Sexual Activity    Alcohol use: Yes     Comment: socially    Drug use: No    Sexual activity: Yes     Partners: Male     Social Determinants of Health     Financial Resource Strain: Low Risk     Difficulty of Paying Living Expenses: Not hard at all   Food Insecurity: No Food Insecurity    Worried About Running Out of Food in the Last Year: Never true    Ran Out of Food in the Last Year: Never true   Transportation Needs: Unknown    Lack of Transportation (Non-Medical): No   Housing Stability: Unknown    Unstable Housing in the Last Year: No       Family History   Problem Relation Age of Onset    Rheum Arthritis Mother     Heart Failure Mother     Colon Cancer Father          Review of Systems   Constitutional: Negative. HENT:  Negative for congestion, facial swelling, hearing loss, nosebleeds, sinus pressure and sore throat. Eyes:  Negative for photophobia and visual disturbance. Respiratory:  Negative for apnea, cough, chest tightness, shortness of breath and wheezing. Cardiovascular:  Negative for chest pain, palpitations and leg swelling. Gastrointestinal:  Negative for abdominal pain, blood in stool, diarrhea, nausea and vomiting.    Genitourinary:  Negative for

## 2023-07-09 DIAGNOSIS — M06.09 RHEUMATOID ARTHRITIS OF MULTIPLE SITES WITH NEGATIVE RHEUMATOID FACTOR (HCC): ICD-10-CM

## 2023-07-10 RX ORDER — HYDROXYCHLOROQUINE SULFATE 200 MG/1
400 TABLET, FILM COATED ORAL 2 TIMES DAILY
Qty: 180 TABLET | Refills: 0 | Status: SHIPPED | OUTPATIENT
Start: 2023-07-10

## 2023-08-10 DIAGNOSIS — M06.09 RHEUMATOID ARTHRITIS OF MULTIPLE SITES WITH NEGATIVE RHEUMATOID FACTOR (HCC): ICD-10-CM

## 2023-08-10 DIAGNOSIS — M25.50 ARTHRALGIA, UNSPECIFIED JOINT: ICD-10-CM

## 2023-08-11 RX ORDER — PREDNISONE 20 MG/1
TABLET ORAL
Qty: 11 TABLET | Refills: 0 | Status: SHIPPED | OUTPATIENT
Start: 2023-08-11

## 2023-09-08 DIAGNOSIS — N32.81 OAB (OVERACTIVE BLADDER): ICD-10-CM

## 2023-09-08 DIAGNOSIS — M25.50 ARTHRALGIA, UNSPECIFIED JOINT: ICD-10-CM

## 2023-09-08 DIAGNOSIS — M06.09 RHEUMATOID ARTHRITIS OF MULTIPLE SITES WITH NEGATIVE RHEUMATOID FACTOR (HCC): ICD-10-CM

## 2023-09-08 RX ORDER — FLUTICASONE PROPIONATE AND SALMETEROL 250; 50 UG/1; UG/1
1 POWDER RESPIRATORY (INHALATION) EVERY 12 HOURS
Qty: 60 EACH | Refills: 3 | Status: SHIPPED | OUTPATIENT
Start: 2023-09-08

## 2023-09-08 RX ORDER — SOLIFENACIN SUCCINATE 10 MG/1
10 TABLET, FILM COATED ORAL DAILY
Qty: 30 TABLET | Refills: 5 | Status: SHIPPED | OUTPATIENT
Start: 2023-09-08

## 2023-09-08 RX ORDER — PREDNISONE 20 MG/1
TABLET ORAL
Qty: 11 TABLET | Refills: 0 | Status: SHIPPED | OUTPATIENT
Start: 2023-09-08

## 2023-10-08 DIAGNOSIS — M06.09 RHEUMATOID ARTHRITIS OF MULTIPLE SITES WITH NEGATIVE RHEUMATOID FACTOR (HCC): ICD-10-CM

## 2023-10-08 DIAGNOSIS — M25.50 ARTHRALGIA, UNSPECIFIED JOINT: ICD-10-CM

## 2023-10-08 DIAGNOSIS — F41.8 DEPRESSION WITH ANXIETY: ICD-10-CM

## 2023-10-09 RX ORDER — PREDNISONE 20 MG/1
TABLET ORAL
Qty: 11 TABLET | Refills: 0 | Status: SHIPPED | OUTPATIENT
Start: 2023-10-09

## 2023-10-09 RX ORDER — ESCITALOPRAM OXALATE 10 MG/1
10 TABLET ORAL DAILY
Qty: 30 TABLET | Refills: 3 | Status: SHIPPED | OUTPATIENT
Start: 2023-10-09

## 2023-10-30 RX ORDER — PANTOPRAZOLE SODIUM 40 MG/1
40 TABLET, DELAYED RELEASE ORAL DAILY
Qty: 30 TABLET | Refills: 5 | Status: SHIPPED | OUTPATIENT
Start: 2023-10-30

## 2023-11-13 ENCOUNTER — PATIENT MESSAGE (OUTPATIENT)
Dept: PRIMARY CARE CLINIC | Age: 58
End: 2023-11-13

## 2023-11-13 DIAGNOSIS — M25.50 ARTHRALGIA, UNSPECIFIED JOINT: ICD-10-CM

## 2023-11-13 DIAGNOSIS — M06.09 RHEUMATOID ARTHRITIS OF MULTIPLE SITES WITH NEGATIVE RHEUMATOID FACTOR (HCC): ICD-10-CM

## 2023-11-13 RX ORDER — PREDNISONE 20 MG/1
TABLET ORAL
Qty: 11 TABLET | Refills: 0 | Status: SHIPPED | OUTPATIENT
Start: 2023-11-13

## 2023-11-13 NOTE — TELEPHONE ENCOUNTER
From: [facility]  To: Dr. Landry Aviles  Sent: 11/13/2023 12:49 PM EST  Subject: Prednisone     Good afternoon,     I need a refill of prednisone for arthritis at maintenance dose of 5 mg. Thank you so much.     [facility] Nutrition Services Progress Note    Referring Provider:  Cristian Miramontes MD    Referral Diagnosis:    Type 2 diabetes mellitus without complication, without long-term current use of insulin (CMS/[facility]) [E11.9]    Start Time:  0900 End Time:   1115   class time with nurse and dietitian    Reason for Visit: Group 1 Diabetes Self Management Education/Training        Nutrition Prescription:  2-4 carbohydrate choices at meals, snacks should be 0-2 carbohydrate choices. Meals should be 4-5 hours apart, if snacks are included they need to be at least 2 hours before or after the previous meal.  Stay at lower end of range to promote weight loss.     Food and Nutrition Related History:  Client History:  Diabetes Assessment reviewed.   Changes patient reports making so far include adding a small breakfast.  Patient brought in completed food records.    Type of food/meals: has been having a small roll, fruit, or cereal for a breakfast with black coffee.  Lunches and dinner vary, could be chicken strips and broasted potato, to salad with hard boiled eggs and cheese. Most meals contain some carbohydrate, a few without any. Patient frequently has pasta/noodles, is a favorite food for her.  Patient makes her own salt free seasoning blends, likes to cook and loves to bake.   Meal/Snack pattern: is now having 3 meals per day.     Blood sugars ranges (mg/dl): Reviewed by RN ISAURO      Hemoglobin A1C (%)   Date Value   05/28/2019 6.5 (H)       Anthropometric Measurements:  Estimated body mass index is 36.52 kg/m² as calculated from the following:    Height as of 6/17/19: 5' 4\" (1.626 m).    Weight as of this encounter: 96.5 kg.    Nutrition Diagnosis:  Food-and-nutrition-related knowledge deficit :  related to consistent carbohydrate meal planning for diabetes as evidenced by new diagnosis, no previous education on this topic.    Intervention:  Recommended modifications:  Provided education on the effect of timing and amount of  carbohydrate on blood sugars, carbohydrate counting for meals and snacks, use of nutrition labels in meal planning, use of artificial sweeteners and sugar substitutes and a personalized meal plan. Provided information on the potentional benefits of modest weight loss and patient's 5% weight loss goal of 10.5 pounds.  Materials provided include Type 2 Education Folder, Diabetes Management Book    Monitoring and Evaluation:  Knowledge/Beliefs/Attitudes:  Food and nutrition knowledge  -  Area(s) and level of knowledge: Patient verbalized a Basic level of understanding of today's covered topics.     Barriers and Readiness to Learning:  The instruction was given to Kellie  Readiness to learn: The patient demonstrates the ability to understand and asks questions.  Material was presented using verbal, written, demonstration and return demonstration.     Recommended Follow-up:  Follow-up appointment session 2.  The patient was encouraged to call back if any questions or concerns.    See Patient Instructions for further information.  Thank you for your referral.  Please contact me with any question/concerns.          Diabetes Care Plan    Diabetic Instruction:  Class Initial   Type of Education:  Comprehensive    Length of visit:  120 minutes    Barriers to self care:  None   ----------------------------------------------------------------------------------------------------------------  1. Diabetes disease process/overview and treatment options  a.  Definition, types of diabetes, diagnostic criteria of diabetes:  Instructed/competent  b.  Causes, risk factors, symptoms of diabetes:  Instructed/competent  c.  Importance of diabetes control, ongoing education, and possible treatment changes/options:  Instructed/competent  d.  Effects of gestational diabetes on baby and mother:  Not applicable  2. Monitoring  a. Blood glucose (purpose, testing times, care of meter/test strips, correct technique, keeping a record, lancet  disposal and alternative site testing-if applicable):  Able to perform skill/verbalize  b. Sharps disposal:  Competent  c. Action for results outside target range:  Instructed/competent  d. A1c define, state goal, test schedule:  Instructed/competent  e. Urine ketone testing (why, when, how to test):  Not applicable  3. Diabetes Medications  a. Teaching medications list:  Oral medications (dose, schedule, action, side effects)  b. Medication adjustments/supplements, which may include meals, activity changes, travel, surgery:  Instructed/competent  c. Over the counter medications:  Needs instruction  4. Physical Activity  a. Effects of physical activity on blood glucose levels, general health benefits, hypoglycemia prevention:  Instructed/competent  b. Develop a physical activity plan/goals (types, frequency, duration, intensity, medical clearance:  Instructed/competent  c.  Adjusting food and blood glucose testing for planned or unplanned activity:  Instructed/competent  5. Psychosocial Strategies  a. Effect of stress on blood glucose and healthy coping strategies:  Needs instruction  b. Diabetes distress:  Needs instruction  c. Depression signs and symptoms, possible treatment and resources:  Needs instruction  d. Community resources and support systems:  Needs instruction  6. Nutrition  a. Effect of timing, amount and type of carbohydrate on blood glucose:  Instructed/competent  b. Understanding of nutrition labels and meal planning:  Instructed/competent  c. Understanding of personalized meal plan:  Instructed/competent  d. Healthy dining out practices:  Needs instruction  e. Effects of drinking alcohol on blood glucose (hypoglycemia) and if pregnant, effects on baby:  Needs instruction  f. Nutrition strategies for lipid, blood pressure managementning out practices:  Needs instruction  g. Understanding of healthy food preparation:  Needs instruction  h. Sodium in your diet:  Needs instruction  i. Fiber in your  diet:  Needs instruction  j. Effect of modest weight loss (if overweight or obese) on blood glucose control:  Instructed/competent  k. Understanding of nutritional/herbal supplements on diabetes control:  Needs instruction  l. Skills for adapting meal plan to altered meal times, travel, holidays, sick days, schedule changes, and unplanned physical activity:  Needs instruction  7. Acute/Chronic Complications (prevention, detection, treatment)  a.  Hypoglycemia (risk, causes, signs, symptoms, treatment and prevention):  Instructed/competent  b.  Hypoglycemia unawareness:  Not applicable  c.  Problem solving:  Including when to contact physician/diabetes care team:  Instructed/competent  d.  Glucagon (prescription); Support person instruction:  Not applicable  e.  Safe driving practices:  Need for medical identification use:  Needs instruction  f.  Hyperglycemia (risk, causes, signs, symptoms, treatment, prevention):  Instructed/competent  g.  Sick day (effect of illness on blood glucose, guidelines for sick day self-care and diabetic ketoacidosis:  Needs instruction  h. Emergency preparednesss/Supply management:  Needs instruction  i.  Risk reduction strategies for prevention and treatment of nephropathy, retinopathy, neuropathy, frequent infections and cardiovascular disease:  Needs instruction  j.  Essential Care Guidelines:  Tests (A1c, lipids, albumin/creatinine, diabetes focused visits every 3-6 months, dilated eye exam, dental visits and proper health care, annual comprehensive lower extremity exam, daily foot care and immunizations (flu, pneumonia, hepatitis B), general health benefits, hypoglycemia prevention:  Needs instruction  k. If pregnant, risks to baby including smoking, alcohol and drug use:  Not applicable  8. Strategies to Promote Health/Behavioral Changes  a. Problem solving strategies:  Instructed/competent  b. Behavioral changes:  Instructed/competent  c. Preconception counseling/care:  Not  applicable  d. Post partum 2-hour glucose:  Not applicable  9. Insulin Pump  Insulin Pump: Infusion set insertion & set change, alerts & alarms, site locations & care, setting basal and bolus rates, operating advanced settings, actions for glucose results outside target range:  Not applicable  10. Continuous Glucose Monitoring  Continuous Glucose Monitoring: Purpose, correct sensor location, insertion & care, alerts & alarms, calibration, , actions for glucose results outside target range:  Not applicable  11. Diabetes Self-Management Support Plan  Diabetes Educator Contact Number  12. Goals  Physical activity New Walk most days for 20 minutes and to physical therapy exerises   Nutrition New  Include a more substantial breakfast and lighter supper, keep cho portions to half of usual   Monitoring 4=% continue testing twice daily.

## 2024-01-01 DIAGNOSIS — M25.50 ARTHRALGIA, UNSPECIFIED JOINT: ICD-10-CM

## 2024-01-01 DIAGNOSIS — M06.09 RHEUMATOID ARTHRITIS OF MULTIPLE SITES WITH NEGATIVE RHEUMATOID FACTOR (HCC): ICD-10-CM

## 2024-01-02 RX ORDER — PREDNISONE 20 MG/1
TABLET ORAL
Qty: 11 TABLET | Refills: 0 | Status: SHIPPED | OUTPATIENT
Start: 2024-01-02

## 2024-01-02 RX ORDER — FUROSEMIDE 40 MG/1
40 TABLET ORAL DAILY PRN
Qty: 30 TABLET | Refills: 5 | Status: SHIPPED | OUTPATIENT
Start: 2024-01-02

## 2024-01-02 RX ORDER — POTASSIUM CHLORIDE 20 MEQ/1
40 TABLET, EXTENDED RELEASE ORAL
Qty: 60 TABLET | Refills: 5 | Status: SHIPPED | OUTPATIENT
Start: 2024-01-02

## 2024-01-28 DIAGNOSIS — M06.09 RHEUMATOID ARTHRITIS OF MULTIPLE SITES WITH NEGATIVE RHEUMATOID FACTOR (HCC): ICD-10-CM

## 2024-01-28 DIAGNOSIS — F41.8 DEPRESSION WITH ANXIETY: ICD-10-CM

## 2024-01-28 DIAGNOSIS — M25.50 ARTHRALGIA, UNSPECIFIED JOINT: ICD-10-CM

## 2024-01-29 RX ORDER — PREDNISONE 20 MG/1
TABLET ORAL
Qty: 11 TABLET | Refills: 0 | Status: SHIPPED | OUTPATIENT
Start: 2024-01-29

## 2024-01-29 RX ORDER — ESCITALOPRAM OXALATE 10 MG/1
10 TABLET ORAL DAILY
Qty: 30 TABLET | Refills: 3 | Status: SHIPPED | OUTPATIENT
Start: 2024-01-29

## 2024-01-29 RX ORDER — HYDROXYCHLOROQUINE SULFATE 200 MG/1
400 TABLET, FILM COATED ORAL 2 TIMES DAILY
Qty: 180 TABLET | Refills: 0 | Status: SHIPPED | OUTPATIENT
Start: 2024-01-29

## 2024-02-23 NOTE — PLAN OF CARE
Problem: Diarrhea:  Goal: Occurrences of diarrhea will decrease  Description: Occurrences of diarrhea will decrease     Outcome: Met This Shift     Problem: Isolation Precautions - Risk of Spread of Infection  Goal: Prevent transmission of infection  Outcome: Met This Shift     Problem: Falls - Risk of:  Goal: Will remain free from falls  Description: Will remain free from falls  Outcome: Met This Shift  Goal: Absence of physical injury  Description: Absence of physical injury  Outcome: Met This Shift The soonest available MRIs were scheduled at [facility] over 3 weeks in April. The final MRI scheduled for 4/17.

## 2024-03-01 DIAGNOSIS — M25.50 ARTHRALGIA, UNSPECIFIED JOINT: ICD-10-CM

## 2024-03-01 DIAGNOSIS — M06.09 RHEUMATOID ARTHRITIS OF MULTIPLE SITES WITH NEGATIVE RHEUMATOID FACTOR (HCC): ICD-10-CM

## 2024-03-01 RX ORDER — PREDNISONE 20 MG/1
TABLET ORAL
Qty: 11 TABLET | Refills: 0 | Status: SHIPPED | OUTPATIENT
Start: 2024-03-01

## 2024-04-23 ENCOUNTER — OFFICE VISIT (OUTPATIENT)
Dept: PRIMARY CARE CLINIC | Age: 59
End: 2024-04-23
Payer: COMMERCIAL

## 2024-04-23 VITALS
OXYGEN SATURATION: 96 % | DIASTOLIC BLOOD PRESSURE: 66 MMHG | TEMPERATURE: 97.7 F | HEART RATE: 72 BPM | SYSTOLIC BLOOD PRESSURE: 118 MMHG | HEIGHT: 70 IN | WEIGHT: 287 LBS | BODY MASS INDEX: 41.09 KG/M2

## 2024-04-23 DIAGNOSIS — J45.31 MILD PERSISTENT ASTHMATIC BRONCHITIS WITH ACUTE EXACERBATION: Primary | ICD-10-CM

## 2024-04-23 DIAGNOSIS — N32.81 OAB (OVERACTIVE BLADDER): ICD-10-CM

## 2024-04-23 PROCEDURE — 1036F TOBACCO NON-USER: CPT | Performed by: FAMILY MEDICINE

## 2024-04-23 PROCEDURE — G8417 CALC BMI ABV UP PARAM F/U: HCPCS | Performed by: FAMILY MEDICINE

## 2024-04-23 PROCEDURE — 3017F COLORECTAL CA SCREEN DOC REV: CPT | Performed by: FAMILY MEDICINE

## 2024-04-23 PROCEDURE — 99214 OFFICE O/P EST MOD 30 MIN: CPT | Performed by: FAMILY MEDICINE

## 2024-04-23 PROCEDURE — G8427 DOCREV CUR MEDS BY ELIG CLIN: HCPCS | Performed by: FAMILY MEDICINE

## 2024-04-23 RX ORDER — FLUCONAZOLE 150 MG/1
150 TABLET ORAL ONCE
Qty: 1 TABLET | Refills: 1 | Status: SHIPPED | OUTPATIENT
Start: 2024-04-23 | End: 2024-04-23

## 2024-04-23 RX ORDER — OXYBUTYNIN CHLORIDE 10 MG/1
10 TABLET, EXTENDED RELEASE ORAL DAILY
Qty: 30 TABLET | Refills: 3 | Status: SHIPPED | OUTPATIENT
Start: 2024-04-23

## 2024-04-23 RX ORDER — DOXYCYCLINE HYCLATE 100 MG
100 TABLET ORAL 2 TIMES DAILY
Qty: 20 TABLET | Refills: 0 | Status: SHIPPED | OUTPATIENT
Start: 2024-04-23 | End: 2024-05-03

## 2024-04-23 RX ORDER — PREDNISONE 20 MG/1
TABLET ORAL
Qty: 11 TABLET | Refills: 0 | Status: SHIPPED | OUTPATIENT
Start: 2024-04-23

## 2024-04-23 RX ORDER — PREDNISONE 1 MG/1
TABLET ORAL
COMMUNITY
Start: 2024-04-05

## 2024-04-23 ASSESSMENT — PATIENT HEALTH QUESTIONNAIRE - PHQ9
5. POOR APPETITE OR OVEREATING: NOT AT ALL
SUM OF ALL RESPONSES TO PHQ QUESTIONS 1-9: 0
9. THOUGHTS THAT YOU WOULD BE BETTER OFF DEAD, OR OF HURTING YOURSELF: NOT AT ALL
SUM OF ALL RESPONSES TO PHQ9 QUESTIONS 1 & 2: 0
2. FEELING DOWN, DEPRESSED OR HOPELESS: NOT AT ALL
8. MOVING OR SPEAKING SO SLOWLY THAT OTHER PEOPLE COULD HAVE NOTICED. OR THE OPPOSITE, BEING SO FIGETY OR RESTLESS THAT YOU HAVE BEEN MOVING AROUND A LOT MORE THAN USUAL: NOT AT ALL
1. LITTLE INTEREST OR PLEASURE IN DOING THINGS: NOT AT ALL
SUM OF ALL RESPONSES TO PHQ QUESTIONS 1-9: 0
3. TROUBLE FALLING OR STAYING ASLEEP: NOT AT ALL
6. FEELING BAD ABOUT YOURSELF - OR THAT YOU ARE A FAILURE OR HAVE LET YOURSELF OR YOUR FAMILY DOWN: NOT AT ALL
SUM OF ALL RESPONSES TO PHQ QUESTIONS 1-9: 0
10. IF YOU CHECKED OFF ANY PROBLEMS, HOW DIFFICULT HAVE THESE PROBLEMS MADE IT FOR YOU TO DO YOUR WORK, TAKE CARE OF THINGS AT HOME, OR GET ALONG WITH OTHER PEOPLE: NOT DIFFICULT AT ALL
SUM OF ALL RESPONSES TO PHQ QUESTIONS 1-9: 0
4. FEELING TIRED OR HAVING LITTLE ENERGY: NOT AT ALL
7. TROUBLE CONCENTRATING ON THINGS, SUCH AS READING THE NEWSPAPER OR WATCHING TELEVISION: NOT AT ALL

## 2024-04-23 ASSESSMENT — ENCOUNTER SYMPTOMS
FACIAL SWELLING: 0
SHORTNESS OF BREATH: 1
CHEST TIGHTNESS: 0
ABDOMINAL PAIN: 0
COLOR CHANGE: 0
VOMITING: 0
APNEA: 0
SINUS PRESSURE: 0
DIARRHEA: 0
BACK PAIN: 0
NAUSEA: 0
SORE THROAT: 0
PHOTOPHOBIA: 0
ALLERGIC/IMMUNOLOGIC NEGATIVE: 1
RHINORRHEA: 1
COUGH: 1
BLOOD IN STOOL: 0
WHEEZING: 1

## 2024-04-23 NOTE — PROGRESS NOTES
Coordination normal.      Deep Tendon Reflexes: Reflexes are normal and symmetric.   Psychiatric:         Behavior: Behavior normal.         Judgment: Judgment normal.                               ASSESSMENT/PLAN:    Tori was seen today for cough.    Diagnoses and all orders for this visit:    Mild persistent asthmatic bronchitis with acute exacerbation  -     predniSONE (DELTASONE) 20 MG tablet; 2 tabs qd x 3 days  1 tab qd x 3 days 0.5 tab qd x 3 days  -     doxycycline hyclate (VIBRA-TABS) 100 MG tablet; Take 1 tablet by mouth 2 times daily for 10 days  -     XR CHEST (2 VW); Future  -     fluconazole (DIFLUCAN) 150 MG tablet; Take 1 tablet by mouth once for 1 dose    OAB (overactive bladder)  -     oxyBUTYnin (DITROPAN XL) 10 MG extended release tablet; Take 1 tablet by mouth daily      Labs reviewed      Franklin Maher DO    4/23/2024  3:57 PM

## 2024-04-30 ENCOUNTER — OFFICE VISIT (OUTPATIENT)
Dept: PRIMARY CARE CLINIC | Age: 59
End: 2024-04-30
Payer: COMMERCIAL

## 2024-04-30 VITALS
RESPIRATION RATE: 18 BRPM | HEIGHT: 70 IN | TEMPERATURE: 97.8 F | HEART RATE: 63 BPM | BODY MASS INDEX: 41.09 KG/M2 | DIASTOLIC BLOOD PRESSURE: 86 MMHG | WEIGHT: 287 LBS | SYSTOLIC BLOOD PRESSURE: 128 MMHG | OXYGEN SATURATION: 93 %

## 2024-04-30 DIAGNOSIS — J45.31 MILD PERSISTENT ASTHMATIC BRONCHITIS WITH ACUTE EXACERBATION: Primary | ICD-10-CM

## 2024-04-30 PROCEDURE — G8417 CALC BMI ABV UP PARAM F/U: HCPCS | Performed by: FAMILY MEDICINE

## 2024-04-30 PROCEDURE — 3017F COLORECTAL CA SCREEN DOC REV: CPT | Performed by: FAMILY MEDICINE

## 2024-04-30 PROCEDURE — 1036F TOBACCO NON-USER: CPT | Performed by: FAMILY MEDICINE

## 2024-04-30 PROCEDURE — 99213 OFFICE O/P EST LOW 20 MIN: CPT | Performed by: FAMILY MEDICINE

## 2024-04-30 PROCEDURE — G8427 DOCREV CUR MEDS BY ELIG CLIN: HCPCS | Performed by: FAMILY MEDICINE

## 2024-04-30 RX ORDER — PREDNISONE 20 MG/1
TABLET ORAL
Qty: 11 TABLET | Refills: 0 | Status: SHIPPED | OUTPATIENT
Start: 2024-04-30

## 2024-04-30 RX ORDER — MONTELUKAST SODIUM 10 MG/1
10 TABLET ORAL DAILY
Qty: 30 TABLET | Refills: 3 | Status: SHIPPED | OUTPATIENT
Start: 2024-04-30

## 2024-04-30 ASSESSMENT — ENCOUNTER SYMPTOMS
WHEEZING: 1
DIARRHEA: 0
BACK PAIN: 0
CHEST TIGHTNESS: 0
FACIAL SWELLING: 0
ABDOMINAL PAIN: 0
SINUS PRESSURE: 0
PHOTOPHOBIA: 0
SHORTNESS OF BREATH: 0
BLOOD IN STOOL: 0
COUGH: 1
NAUSEA: 0
SORE THROAT: 0
COLOR CHANGE: 0
ALLERGIC/IMMUNOLOGIC NEGATIVE: 1
APNEA: 0
VOMITING: 0

## 2024-04-30 NOTE — PROGRESS NOTES
Chief Complaint:   Chief Complaint   Patient presents with    Cough     On going not better.        Cough  This is a new problem. The current episode started 1 to 4 weeks ago. The problem has been waxing and waning. The problem occurs constantly. The cough is Non-productive. Associated symptoms include wheezing. Pertinent negatives include no chest pain, headaches, myalgias, rash, sore throat or shortness of breath. She has tried a beta-agonist inhaler for the symptoms.   Not compliant with advair     Patient Active Problem List   Diagnosis    Arthritis    Asthma    Acute cystitis without hematuria    Viral URI    Bronchitis    Rheumatoid arthritis of multiple sites with negative rheumatoid factor (HCC)    Tachycardia    Cardiomyopathy (HCC)    LBBB (left bundle branch block)    COVID-19    Pneumonia due to COVID-19 virus    Hypokalemia    Rheumatoid arthritis (HCC)       Past Medical History:   Diagnosis Date    Arthritis     Asthma     Cardiomyopathy (HCC) 2/20/2020    Kidney stone 1/10/2016    LBBB (left bundle branch block) 3/10/2020    Rheumatoid arthritis of multiple sites with negative rheumatoid factor ([facility]) 8/14/2019       Past Surgical History:   Procedure Laterality Date    CHOLECYSTECTOMY      CYSTOSCOPY  01/10/2015    uretroscopy,lithotripsy,stent placement    ENDOMETRIAL ABLATION         Current Outpatient Medications   Medication Sig Dispense Refill    montelukast (SINGULAIR) 10 MG tablet Take 1 tablet by mouth daily 30 tablet 3    predniSONE (DELTASONE) 20 MG tablet 2 tabs qd x 3 days  1 tab qd x 3 days 0.5 tab qd x 3 days 11 tablet 0    predniSONE (DELTASONE) 1 MG tablet TAKE 4 TABLETS BY MOUTH ONCE DAILY      empagliflozin (JARDIANCE) 10 MG tablet Take 1 tablet by mouth daily (with breakfast)      predniSONE (DELTASONE) 20 MG tablet 2 tabs qd x 3 days  1 tab qd x 3 days 0.5 tab qd x 3 days 11 tablet 0    doxycycline hyclate (VIBRA-TABS) 100 MG tablet Take 1 tablet by mouth 2 times daily for 10 days

## 2024-07-06 DIAGNOSIS — F41.8 DEPRESSION WITH ANXIETY: ICD-10-CM

## 2024-07-07 RX ORDER — ESCITALOPRAM OXALATE 10 MG/1
10 TABLET ORAL DAILY
Qty: 30 TABLET | Refills: 3 | Status: SHIPPED | OUTPATIENT
Start: 2024-07-07

## 2024-07-23 RX ORDER — FLUTICASONE PROPIONATE AND SALMETEROL 250; 50 UG/1; UG/1
1 POWDER RESPIRATORY (INHALATION) EVERY 12 HOURS
Qty: 60 EACH | Refills: 3 | Status: SHIPPED | OUTPATIENT
Start: 2024-07-23

## 2024-09-09 DIAGNOSIS — J45.31 MILD PERSISTENT ASTHMATIC BRONCHITIS WITH ACUTE EXACERBATION: ICD-10-CM

## 2024-09-10 RX ORDER — PANTOPRAZOLE SODIUM 40 MG/1
40 TABLET, DELAYED RELEASE ORAL DAILY
Qty: 30 TABLET | Refills: 5 | Status: SHIPPED | OUTPATIENT
Start: 2024-09-10

## 2024-09-10 RX ORDER — MONTELUKAST SODIUM 10 MG/1
10 TABLET ORAL DAILY
Qty: 30 TABLET | Refills: 3 | Status: SHIPPED | OUTPATIENT
Start: 2024-09-10

## 2024-10-09 DIAGNOSIS — N32.81 OAB (OVERACTIVE BLADDER): ICD-10-CM

## 2024-10-09 RX ORDER — OXYBUTYNIN CHLORIDE 10 MG/1
10 TABLET, EXTENDED RELEASE ORAL DAILY
Qty: 30 TABLET | Refills: 3 | Status: SHIPPED | OUTPATIENT
Start: 2024-10-09

## 2024-10-14 RX ORDER — METOPROLOL SUCCINATE 100 MG/1
100 TABLET, EXTENDED RELEASE ORAL DAILY
Qty: 30 TABLET | Refills: 5 | Status: SHIPPED | OUTPATIENT
Start: 2024-10-14

## 2024-10-25 ENCOUNTER — OFFICE VISIT (OUTPATIENT)
Dept: PRIMARY CARE CLINIC | Age: 59
End: 2024-10-25

## 2024-10-25 VITALS
HEART RATE: 95 BPM | OXYGEN SATURATION: 97 % | SYSTOLIC BLOOD PRESSURE: 132 MMHG | RESPIRATION RATE: 18 BRPM | HEIGHT: 70 IN | BODY MASS INDEX: 40.52 KG/M2 | DIASTOLIC BLOOD PRESSURE: 80 MMHG | WEIGHT: 283 LBS | TEMPERATURE: 97.9 F

## 2024-10-25 DIAGNOSIS — U07.1 COVID-19 VIRUS INFECTION: ICD-10-CM

## 2024-10-25 DIAGNOSIS — J45.31 MILD PERSISTENT ASTHMATIC BRONCHITIS WITH ACUTE EXACERBATION: ICD-10-CM

## 2024-10-25 DIAGNOSIS — R05.9 COUGH, UNSPECIFIED TYPE: Primary | ICD-10-CM

## 2024-10-25 LAB
INFLUENZA A ANTIBODY: NEGATIVE
INFLUENZA B ANTIBODY: NEGATIVE
Lab: ABNORMAL
PERFORMING INSTRUMENT: ABNORMAL
QC PASS/FAIL: ABNORMAL
SARS-COV-2, POC: DETECTED

## 2024-10-25 RX ORDER — METHOTREXATE 25 MG/ML
INJECTION, SOLUTION INTRA-ARTERIAL; INTRAMUSCULAR; INTRAVENOUS
COMMUNITY
Start: 2024-10-09

## 2024-10-25 RX ORDER — SYRINGE AND NEEDLE,INSULIN,1ML 30 G X1/2"
SYRINGE, EMPTY DISPOSABLE MISCELLANEOUS
COMMUNITY
Start: 2024-09-18

## 2024-10-25 RX ORDER — FOLIC ACID 1 MG/1
1000 TABLET ORAL DAILY
COMMUNITY
Start: 2024-09-09

## 2024-10-25 RX ORDER — PREDNISONE 20 MG/1
TABLET ORAL
Qty: 11 TABLET | Refills: 0 | Status: SHIPPED | OUTPATIENT
Start: 2024-10-25

## 2024-10-25 ASSESSMENT — ENCOUNTER SYMPTOMS
DIARRHEA: 0
WHEEZING: 1
CHEST TIGHTNESS: 0
ABDOMINAL PAIN: 0
BACK PAIN: 0
SHORTNESS OF BREATH: 0
SINUS PRESSURE: 0
VOMITING: 0
BLOOD IN STOOL: 0
SORE THROAT: 0
NAUSEA: 0
COLOR CHANGE: 0
COUGH: 0
FACIAL SWELLING: 0
PHOTOPHOBIA: 0
ALLERGIC/IMMUNOLOGIC NEGATIVE: 1
APNEA: 0

## 2024-10-25 NOTE — PROGRESS NOTES
Chief Complaint:   Chief Complaint   Patient presents with    Cough     Started Monday. Mucinex, Delsym no relief.     Wheezing       Cough  This is a new problem. The current episode started in the past 7 days. The problem has been gradually worsening. The cough is Productive of sputum. Associated symptoms include wheezing. Pertinent negatives include no chest pain, headaches, myalgias, rash, sore throat or shortness of breath. The treatment provided significant relief. There is no history of asthma.       Patient Active Problem List   Diagnosis    Arthritis    Asthma    Acute cystitis without hematuria    Viral URI    Bronchitis    Rheumatoid arthritis of multiple sites with negative rheumatoid factor (HCC)    Tachycardia    Cardiomyopathy (HCC)    LBBB (left bundle branch block)    COVID-19    Pneumonia due to COVID-19 virus    Hypokalemia    Rheumatoid arthritis (HCC)       Past Medical History:   Diagnosis Date    Arthritis     Asthma     Cardiomyopathy (HCC) 2/20/2020    Kidney stone 1/10/2016    LBBB (left bundle branch block) 3/10/2020    Rheumatoid arthritis of multiple sites with negative rheumatoid factor ([facility]) 8/14/2019       Past Surgical History:   Procedure Laterality Date    CHOLECYSTECTOMY      CYSTOSCOPY  01/10/2015    uretroscopy,lithotripsy,stent placement    ENDOMETRIAL ABLATION         Current Outpatient Medications   Medication Sig Dispense Refill    methotrexate Sodium 50 MG/2ML chemo injection INJECT 0.6ML SUBCUTANEOUSLY ONE TIME A WEEK      Sotagliflozin 200 MG TABS Take 200 mg by mouth every morning (before breakfast)      ULTICARE INSULIN SYRINGE 30G X 5/16\" 1 ML MISC USE 1 SYRINGE ONE TIME A WEEK      folic acid (FOLVITE) 1 MG tablet Take 1 tablet by mouth daily      predniSONE (DELTASONE) 20 MG tablet 2 tabs qd x 3 days  1 tab qd x 3 days 0.5 tab qd x 3 days 11 tablet 0    nirmatrelvir/ritonavir 300/100 (PAXLOVID) 20 x 150 MG & 10 x 100MG TBPK Take 3 tablets (two 150 mg nirmatrelvir and

## 2024-11-11 ENCOUNTER — PATIENT MESSAGE (OUTPATIENT)
Dept: PRIMARY CARE CLINIC | Age: 59
End: 2024-11-11

## 2024-11-11 DIAGNOSIS — Z12.31 ENCOUNTER FOR SCREENING MAMMOGRAM FOR MALIGNANT NEOPLASM OF BREAST: Primary | ICD-10-CM

## 2024-12-06 ENCOUNTER — HOSPITAL ENCOUNTER (OUTPATIENT)
Dept: GENERAL RADIOLOGY | Age: 59
Discharge: HOME | End: 2024-12-08
Payer: COMMERCIAL

## 2024-12-06 VITALS — HEIGHT: 70 IN | WEIGHT: 280 LBS | BODY MASS INDEX: 40.09 KG/M2

## 2024-12-06 DIAGNOSIS — Z12.31 ENCOUNTER FOR SCREENING MAMMOGRAM FOR MALIGNANT NEOPLASM OF BREAST: ICD-10-CM

## 2024-12-06 PROCEDURE — 77063 BREAST TOMOSYNTHESIS BI: CPT

## 2024-12-08 DIAGNOSIS — F41.8 DEPRESSION WITH ANXIETY: ICD-10-CM

## 2024-12-09 DIAGNOSIS — R92.8 BREAST LESION ON MAMMOGRAPHY: Primary | ICD-10-CM

## 2024-12-09 RX ORDER — ESCITALOPRAM OXALATE 10 MG/1
10 TABLET ORAL DAILY
Qty: 30 TABLET | Refills: 3 | Status: SHIPPED | OUTPATIENT
Start: 2024-12-09

## 2024-12-10 ENCOUNTER — TELEPHONE (OUTPATIENT)
Dept: GENERAL RADIOLOGY | Age: 59
End: 2024-12-10

## 2024-12-10 NOTE — TELEPHONE ENCOUNTER
Call to patient in reference to her mammogram performed at [facility] on December 6, 2024.  Instructed patient that the radiologist has recommended some additional breast imaging, in order to make a final determination/result. A  from [facility] will contact her to schedule the additional imaging study/studies. Verbalizes understanding and is agreeable to proceed.

## 2025-01-12 DIAGNOSIS — J45.31 MILD PERSISTENT ASTHMATIC BRONCHITIS WITH ACUTE EXACERBATION: ICD-10-CM

## 2025-01-13 RX ORDER — MONTELUKAST SODIUM 10 MG/1
10 TABLET ORAL DAILY
Qty: 30 TABLET | Refills: 3 | Status: SHIPPED | OUTPATIENT
Start: 2025-01-13

## 2025-01-17 ENCOUNTER — OFFICE VISIT (OUTPATIENT)
Dept: PRIMARY CARE CLINIC | Age: 60
End: 2025-01-17

## 2025-01-17 VITALS
SYSTOLIC BLOOD PRESSURE: 134 MMHG | RESPIRATION RATE: 18 BRPM | HEART RATE: 70 BPM | OXYGEN SATURATION: 95 % | DIASTOLIC BLOOD PRESSURE: 80 MMHG | HEIGHT: 70 IN | BODY MASS INDEX: 41.09 KG/M2 | TEMPERATURE: 97.3 F | WEIGHT: 287 LBS

## 2025-01-17 DIAGNOSIS — J45.31 MILD PERSISTENT ASTHMATIC BRONCHITIS WITH ACUTE EXACERBATION: ICD-10-CM

## 2025-01-17 DIAGNOSIS — R05.9 COUGH, UNSPECIFIED TYPE: Primary | ICD-10-CM

## 2025-01-17 LAB
INFLUENZA A ANTIBODY: NEGATIVE
INFLUENZA B ANTIBODY: NEGATIVE
Lab: NORMAL
PERFORMING INSTRUMENT: NORMAL
QC PASS/FAIL: NORMAL
RSV RNA: NEGATIVE
SARS-COV-2, POC: NORMAL

## 2025-01-17 RX ORDER — PREDNISONE 20 MG/1
TABLET ORAL
Qty: 11 TABLET | Refills: 0 | Status: SHIPPED | OUTPATIENT
Start: 2025-01-17

## 2025-01-17 RX ORDER — LEVOFLOXACIN 500 MG/1
500 TABLET, FILM COATED ORAL DAILY
Qty: 10 TABLET | Refills: 0 | Status: SHIPPED | OUTPATIENT
Start: 2025-01-17 | End: 2025-01-27

## 2025-01-17 RX ORDER — SACUBITRIL AND VALSARTAN 24; 26 MG/1; MG/1
TABLET, FILM COATED ORAL
COMMUNITY
Start: 2024-12-31

## 2025-01-17 ASSESSMENT — ENCOUNTER SYMPTOMS
SHORTNESS OF BREATH: 0
COLOR CHANGE: 0
COUGH: 1
WHEEZING: 1
PHOTOPHOBIA: 0
VOMITING: 0
CHEST TIGHTNESS: 0
ALLERGIC/IMMUNOLOGIC NEGATIVE: 1
BACK PAIN: 0
BLOOD IN STOOL: 0
ABDOMINAL PAIN: 0
FACIAL SWELLING: 0
SORE THROAT: 0
DIARRHEA: 0
APNEA: 0
NAUSEA: 0
SINUS PRESSURE: 0

## 2025-01-17 NOTE — PROGRESS NOTES
Chief Complaint:   Chief Complaint   Patient presents with    Cough     Started 2 weeks ago, antibiotic and tessalon pearls little relief.        Cough  This is a new problem. The current episode started in the past 7 days. The problem has been unchanged. The problem occurs constantly. The cough is Productive of sputum. Associated symptoms include wheezing. Pertinent negatives include no chest pain, headaches, myalgias, rash, sore throat or shortness of breath. She has tried nothing for the symptoms. Her past medical history is significant for asthma.       Patient Active Problem List   Diagnosis    Arthritis    Asthma    Acute cystitis without hematuria    Viral URI    Bronchitis    Rheumatoid arthritis of multiple sites with negative rheumatoid factor (HCC)    Tachycardia    Cardiomyopathy (HCC)    LBBB (left bundle branch block)    COVID-19    Pneumonia due to COVID-19 virus    Hypokalemia    Rheumatoid arthritis (HCC)       Past Medical History:   Diagnosis Date    Arthritis     Asthma     Cardiomyopathy (HCC) 2/20/2020    Kidney stone 1/10/2016    LBBB (left bundle branch block) 3/10/2020    Rheumatoid arthritis of multiple sites with negative rheumatoid factor ([facility]) 8/14/2019       Past Surgical History:   Procedure Laterality Date    CHOLECYSTECTOMY      CYSTOSCOPY  01/10/2015    uretroscopy,lithotripsy,stent placement    ENDOMETRIAL ABLATION         Current Outpatient Medications   Medication Sig Dispense Refill    ENTRESTO 24-26 MG per tablet       levoFLOXacin (LEVAQUIN) 500 MG tablet Take 1 tablet by mouth daily for 10 days 10 tablet 0    predniSONE (DELTASONE) 20 MG tablet 2 tabs qd x 3 days  1 tab qd x 3 days 0.5 tab qd x 3 days 11 tablet 0    montelukast (SINGULAIR) 10 MG tablet Take 1 tablet by mouth daily 30 tablet 3    escitalopram (LEXAPRO) 10 MG tablet Take 1 tablet by mouth daily 30 tablet 3    Sotagliflozin 200 MG TABS Take 200 mg by mouth every morning (before breakfast)      metoprolol

## 2025-01-24 ENCOUNTER — HOSPITAL ENCOUNTER (OUTPATIENT)
Dept: GENERAL RADIOLOGY | Age: 60
Discharge: HOME | End: 2025-01-26
Attending: FAMILY MEDICINE
Payer: COMMERCIAL

## 2025-01-24 DIAGNOSIS — R92.8 BREAST LESION ON MAMMOGRAPHY: ICD-10-CM

## 2025-01-24 PROCEDURE — G0279 TOMOSYNTHESIS, MAMMO: HCPCS

## 2025-01-24 PROCEDURE — 76642 ULTRASOUND BREAST LIMITED: CPT

## 2025-02-02 DIAGNOSIS — F41.8 DEPRESSION WITH ANXIETY: ICD-10-CM

## 2025-02-03 RX ORDER — ESCITALOPRAM OXALATE 10 MG/1
10 TABLET ORAL DAILY
Qty: 30 TABLET | Refills: 3 | Status: SHIPPED | OUTPATIENT
Start: 2025-02-03

## 2025-02-11 DIAGNOSIS — N32.81 OAB (OVERACTIVE BLADDER): ICD-10-CM

## 2025-02-11 RX ORDER — OXYBUTYNIN CHLORIDE 10 MG/1
10 TABLET, EXTENDED RELEASE ORAL DAILY
Qty: 30 TABLET | Refills: 3 | Status: SHIPPED | OUTPATIENT
Start: 2025-02-11

## 2025-03-17 RX ORDER — FLUTICASONE PROPIONATE AND SALMETEROL 250; 50 UG/1; UG/1
1 POWDER RESPIRATORY (INHALATION) EVERY 12 HOURS
Qty: 60 EACH | Refills: 3 | Status: SHIPPED | OUTPATIENT
Start: 2025-03-17

## 2025-04-24 RX ORDER — METOPROLOL SUCCINATE 100 MG/1
100 TABLET, EXTENDED RELEASE ORAL DAILY
Qty: 30 TABLET | Refills: 5 | Status: SHIPPED | OUTPATIENT
Start: 2025-04-24

## 2025-05-09 ENCOUNTER — OFFICE VISIT (OUTPATIENT)
Dept: FAMILY MEDICINE CLINIC | Age: 60
End: 2025-05-09
Payer: COMMERCIAL

## 2025-05-09 VITALS
SYSTOLIC BLOOD PRESSURE: 118 MMHG | HEART RATE: 67 BPM | WEIGHT: 282.6 LBS | DIASTOLIC BLOOD PRESSURE: 74 MMHG | TEMPERATURE: 97.8 F | HEIGHT: 70 IN | BODY MASS INDEX: 40.46 KG/M2 | OXYGEN SATURATION: 98 %

## 2025-05-09 DIAGNOSIS — R35.0 URINARY FREQUENCY: Primary | ICD-10-CM

## 2025-05-09 DIAGNOSIS — N76.0 ACUTE VAGINITIS: ICD-10-CM

## 2025-05-09 DIAGNOSIS — L02.416 ABSCESS OF LEFT THIGH: ICD-10-CM

## 2025-05-09 LAB
BILIRUBIN, POC: NEGATIVE
BLOOD URINE, POC: NEGATIVE
CLARITY, POC: CLEAR
COLOR, POC: YELLOW
GLUCOSE URINE, POC: NORMAL MG/DL
KETONES, POC: NEGATIVE MG/DL
LEUKOCYTE EST, POC: NEGATIVE
NITRITE, POC: NEGATIVE
PH, POC: 6
PROTEIN, POC: NEGATIVE MG/DL
SPECIFIC GRAVITY, POC: 1.02
UROBILINOGEN, POC: 0.2 MG/DL

## 2025-05-09 PROCEDURE — 99204 OFFICE O/P NEW MOD 45 MIN: CPT | Performed by: PHYSICIAN ASSISTANT

## 2025-05-09 PROCEDURE — 81002 URINALYSIS NONAUTO W/O SCOPE: CPT | Performed by: PHYSICIAN ASSISTANT

## 2025-05-09 PROCEDURE — G8427 DOCREV CUR MEDS BY ELIG CLIN: HCPCS | Performed by: PHYSICIAN ASSISTANT

## 2025-05-09 PROCEDURE — G8417 CALC BMI ABV UP PARAM F/U: HCPCS | Performed by: PHYSICIAN ASSISTANT

## 2025-05-09 PROCEDURE — 3017F COLORECTAL CA SCREEN DOC REV: CPT | Performed by: PHYSICIAN ASSISTANT

## 2025-05-09 PROCEDURE — 1036F TOBACCO NON-USER: CPT | Performed by: PHYSICIAN ASSISTANT

## 2025-05-09 RX ORDER — FLUCONAZOLE 150 MG/1
150 TABLET ORAL
Qty: 2 TABLET | Refills: 0 | Status: SHIPPED | OUTPATIENT
Start: 2025-05-09 | End: 2025-05-15

## 2025-05-09 RX ORDER — DOXYCYCLINE HYCLATE 100 MG
100 TABLET ORAL 2 TIMES DAILY
Qty: 20 TABLET | Refills: 0 | Status: SHIPPED | OUTPATIENT
Start: 2025-05-09 | End: 2025-05-19

## 2025-05-09 NOTE — PROGRESS NOTES
Culture, Urine    Acute vaginitis    Abscess of left thigh    Other orders  -     fluconazole (DIFLUCAN) 150 MG tablet; Take 1 tablet by mouth every 72 hours for 6 days  -     doxycycline hyclate (VIBRA-TABS) 100 MG tablet; Take 1 tablet by mouth 2 times daily for 10 days        The patient is to call for any concerns or return if any of the signs or symptoms worsen. The patient is to follow-up with PCP in the next 2-3 days for repeat evaluation repeat assessment or go directly to the emergency department.     SIGNATURE: Raejsh Schreiber III, PA-C    This document may have been prepared at least partially through the use of voice recognition software. Although effort is taken to assure the accuracy ofthis document, it is possible that grammatical, syntax, or spelling errors may occur.     **This report was transcribed using voice recognition software. The patient (or guardian, if applicable) and other individuals in attendance with the patient were advised that if Artificial Intelligence would be utilized during this visit to record and process the conversation to generate a clinical note they would be informed prior to start of the visit. The patient (or guardian, if applicable) and other individuals in attendance at the appointment consented to the use of AI if was utilized, including the recording.  Every effort was made to ensure accuracy; however, inadvertent computerized transcription errors may be present.

## 2025-05-10 LAB
CULTURE: NO GROWTH
SPECIMEN DESCRIPTION: NORMAL

## 2025-05-12 ENCOUNTER — RESULTS FOLLOW-UP (OUTPATIENT)
Dept: FAMILY MEDICINE CLINIC | Age: 60
End: 2025-05-12

## 2025-05-18 ENCOUNTER — PATIENT MESSAGE (OUTPATIENT)
Dept: PRIMARY CARE CLINIC | Age: 60
End: 2025-05-18

## 2025-05-18 DIAGNOSIS — J45.31 MILD PERSISTENT ASTHMATIC BRONCHITIS WITH ACUTE EXACERBATION: ICD-10-CM

## 2025-05-18 DIAGNOSIS — N32.81 OAB (OVERACTIVE BLADDER): ICD-10-CM

## 2025-05-19 RX ORDER — PREDNISONE 10 MG/1
5 TABLET ORAL DAILY
Qty: 30 TABLET | Refills: 1 | Status: SHIPPED | OUTPATIENT
Start: 2025-05-19

## 2025-05-19 RX ORDER — PANTOPRAZOLE SODIUM 40 MG/1
40 TABLET, DELAYED RELEASE ORAL DAILY
Qty: 30 TABLET | Refills: 5 | Status: SHIPPED | OUTPATIENT
Start: 2025-05-19

## 2025-05-19 RX ORDER — OXYBUTYNIN CHLORIDE 10 MG/1
10 TABLET, EXTENDED RELEASE ORAL DAILY
Qty: 30 TABLET | Refills: 3 | Status: SHIPPED | OUTPATIENT
Start: 2025-05-19

## 2025-05-19 RX ORDER — MONTELUKAST SODIUM 10 MG/1
10 TABLET ORAL DAILY
Qty: 30 TABLET | Refills: 3 | Status: SHIPPED | OUTPATIENT
Start: 2025-05-19

## 2025-06-05 RX ORDER — MELOXICAM 15 MG/1
15 TABLET ORAL PRN
Qty: 30 TABLET | Refills: 2 | Status: SHIPPED | OUTPATIENT
Start: 2025-06-05

## 2025-06-12 ENCOUNTER — PATIENT MESSAGE (OUTPATIENT)
Dept: PRIMARY CARE CLINIC | Age: 60
End: 2025-06-12

## 2025-07-01 ENCOUNTER — PATIENT MESSAGE (OUTPATIENT)
Dept: PRIMARY CARE CLINIC | Age: 60
End: 2025-07-01

## 2025-07-01 RX ORDER — MIRABEGRON 25 MG/1
25 TABLET, FILM COATED, EXTENDED RELEASE ORAL DAILY
Qty: 30 TABLET | Refills: 2 | Status: SHIPPED | OUTPATIENT
Start: 2025-07-01

## 2025-07-22 ENCOUNTER — PATIENT MESSAGE (OUTPATIENT)
Dept: PRIMARY CARE CLINIC | Age: 60
End: 2025-07-22

## 2025-07-22 RX ORDER — PREDNISONE 20 MG/1
TABLET ORAL
Qty: 11 TABLET | Refills: 0 | Status: SHIPPED | OUTPATIENT
Start: 2025-07-22

## 2025-07-22 RX ORDER — FLUCONAZOLE 150 MG/1
150 TABLET ORAL ONCE
Qty: 1 TABLET | Refills: 1 | Status: SHIPPED | OUTPATIENT
Start: 2025-07-22 | End: 2025-07-22

## 2025-08-05 DIAGNOSIS — J45.31 MILD PERSISTENT ASTHMATIC BRONCHITIS WITH ACUTE EXACERBATION: ICD-10-CM

## 2025-08-05 DIAGNOSIS — F41.8 DEPRESSION WITH ANXIETY: ICD-10-CM

## 2025-08-06 RX ORDER — ESCITALOPRAM OXALATE 10 MG/1
10 TABLET ORAL DAILY
Qty: 30 TABLET | Refills: 3 | Status: SHIPPED | OUTPATIENT
Start: 2025-08-06

## 2025-08-06 RX ORDER — MONTELUKAST SODIUM 10 MG/1
10 TABLET ORAL DAILY
Qty: 30 TABLET | Refills: 3 | Status: SHIPPED | OUTPATIENT
Start: 2025-08-06

## 2025-08-13 ENCOUNTER — OFFICE VISIT (OUTPATIENT)
Dept: PRIMARY CARE CLINIC | Age: 60
End: 2025-08-13
Payer: COMMERCIAL

## 2025-08-13 ENCOUNTER — TELEPHONE (OUTPATIENT)
Dept: PRIMARY CARE CLINIC | Age: 60
End: 2025-08-13

## 2025-08-13 VITALS
BODY MASS INDEX: 40.94 KG/M2 | WEIGHT: 286 LBS | TEMPERATURE: 97.8 F | HEART RATE: 64 BPM | OXYGEN SATURATION: 96 % | SYSTOLIC BLOOD PRESSURE: 130 MMHG | DIASTOLIC BLOOD PRESSURE: 74 MMHG | HEIGHT: 70 IN | RESPIRATION RATE: 18 BRPM

## 2025-08-13 DIAGNOSIS — J45.31 MILD PERSISTENT ASTHMATIC BRONCHITIS WITH ACUTE EXACERBATION: ICD-10-CM

## 2025-08-13 DIAGNOSIS — R05.1 ACUTE COUGH: Primary | ICD-10-CM

## 2025-08-13 LAB
Lab: NORMAL
PERFORMING INSTRUMENT: NORMAL
QC PASS/FAIL: NORMAL
SARS-COV-2, POC: NORMAL

## 2025-08-13 PROCEDURE — G8427 DOCREV CUR MEDS BY ELIG CLIN: HCPCS | Performed by: FAMILY MEDICINE

## 2025-08-13 PROCEDURE — G8417 CALC BMI ABV UP PARAM F/U: HCPCS | Performed by: FAMILY MEDICINE

## 2025-08-13 PROCEDURE — 99213 OFFICE O/P EST LOW 20 MIN: CPT | Performed by: FAMILY MEDICINE

## 2025-08-13 PROCEDURE — 3017F COLORECTAL CA SCREEN DOC REV: CPT | Performed by: FAMILY MEDICINE

## 2025-08-13 PROCEDURE — 1036F TOBACCO NON-USER: CPT | Performed by: FAMILY MEDICINE

## 2025-08-13 PROCEDURE — 87426 SARSCOV CORONAVIRUS AG IA: CPT | Performed by: FAMILY MEDICINE

## 2025-08-13 RX ORDER — PREDNISONE 20 MG/1
TABLET ORAL
Qty: 11 TABLET | Refills: 0 | Status: SHIPPED | OUTPATIENT
Start: 2025-08-13

## 2025-08-13 RX ORDER — LEVOFLOXACIN 500 MG/1
500 TABLET, FILM COATED ORAL DAILY
Qty: 10 TABLET | Refills: 0 | Status: SHIPPED | OUTPATIENT
Start: 2025-08-13 | End: 2025-08-23

## 2025-08-13 RX ORDER — PREDNISONE 5 MG/1
TABLET ORAL
COMMUNITY
Start: 2025-07-22

## 2025-08-13 RX ORDER — IPRATROPIUM BROMIDE AND ALBUTEROL SULFATE 2.5; .5 MG/3ML; MG/3ML
SOLUTION RESPIRATORY (INHALATION)
Qty: 360 ML | Refills: 2 | Status: SHIPPED | OUTPATIENT
Start: 2025-08-13

## 2025-08-13 SDOH — ECONOMIC STABILITY: FOOD INSECURITY: WITHIN THE PAST 12 MONTHS, YOU WORRIED THAT YOUR FOOD WOULD RUN OUT BEFORE YOU GOT MONEY TO BUY MORE.: NEVER TRUE

## 2025-08-13 SDOH — ECONOMIC STABILITY: FOOD INSECURITY: WITHIN THE PAST 12 MONTHS, THE FOOD YOU BOUGHT JUST DIDN'T LAST AND YOU DIDN'T HAVE MONEY TO GET MORE.: NEVER TRUE

## 2025-08-13 ASSESSMENT — ENCOUNTER SYMPTOMS
PHOTOPHOBIA: 0
CHEST TIGHTNESS: 0
SORE THROAT: 0
APNEA: 0
SINUS PRESSURE: 0
NAUSEA: 0
WHEEZING: 1
BLOOD IN STOOL: 0
VOMITING: 0
COUGH: 1
DIARRHEA: 0
COLOR CHANGE: 0
BACK PAIN: 0
ALLERGIC/IMMUNOLOGIC NEGATIVE: 1
FACIAL SWELLING: 0
SHORTNESS OF BREATH: 0
ABDOMINAL PAIN: 0

## 2025-08-13 ASSESSMENT — PATIENT HEALTH QUESTIONNAIRE - PHQ9
SUM OF ALL RESPONSES TO PHQ QUESTIONS 1-9: 0
1. LITTLE INTEREST OR PLEASURE IN DOING THINGS: NOT AT ALL
2. FEELING DOWN, DEPRESSED OR HOPELESS: NOT AT ALL
SUM OF ALL RESPONSES TO PHQ QUESTIONS 1-9: 0

## 2025-08-14 ENCOUNTER — PATIENT MESSAGE (OUTPATIENT)
Dept: PRIMARY CARE CLINIC | Age: 60
End: 2025-08-14

## 2025-08-14 RX ORDER — DOXYCYCLINE HYCLATE 100 MG
100 TABLET ORAL 2 TIMES DAILY
Qty: 20 TABLET | Refills: 0 | Status: SHIPPED | OUTPATIENT
Start: 2025-08-14 | End: 2025-08-24